# Patient Record
Sex: MALE | Race: WHITE | NOT HISPANIC OR LATINO | URBAN - METROPOLITAN AREA
[De-identification: names, ages, dates, MRNs, and addresses within clinical notes are randomized per-mention and may not be internally consistent; named-entity substitution may affect disease eponyms.]

---

## 2017-12-23 ENCOUNTER — EMERGENCY (EMERGENCY)
Facility: HOSPITAL | Age: 72
LOS: 1 days | Discharge: HOME | End: 2017-12-23
Attending: EMERGENCY MEDICINE | Admitting: EMERGENCY MEDICINE
Payer: MEDICARE

## 2017-12-23 VITALS
OXYGEN SATURATION: 98 % | HEART RATE: 109 BPM | RESPIRATION RATE: 20 BRPM | TEMPERATURE: 98 F | WEIGHT: 250 LBS | SYSTOLIC BLOOD PRESSURE: 166 MMHG | HEIGHT: 72 IN | DIASTOLIC BLOOD PRESSURE: 90 MMHG

## 2017-12-23 VITALS
TEMPERATURE: 98 F | RESPIRATION RATE: 16 BRPM | DIASTOLIC BLOOD PRESSURE: 74 MMHG | OXYGEN SATURATION: 98 % | SYSTOLIC BLOOD PRESSURE: 132 MMHG | HEART RATE: 88 BPM

## 2017-12-23 DIAGNOSIS — Z98.89 OTHER SPECIFIED POSTPROCEDURAL STATES: Chronic | ICD-10-CM

## 2017-12-23 LAB
ALBUMIN SERPL ELPH-MCNC: 4.2 G/DL — SIGNIFICANT CHANGE UP (ref 3.3–5.2)
ALP SERPL-CCNC: 60 U/L — SIGNIFICANT CHANGE UP (ref 40–120)
ALT FLD-CCNC: 23 U/L — SIGNIFICANT CHANGE UP
ANION GAP SERPL CALC-SCNC: 13 MMOL/L — SIGNIFICANT CHANGE UP (ref 5–17)
AST SERPL-CCNC: 31 U/L — SIGNIFICANT CHANGE UP
BASOPHILS # BLD AUTO: 0 K/UL — SIGNIFICANT CHANGE UP (ref 0–0.2)
BASOPHILS NFR BLD AUTO: 0.7 % — SIGNIFICANT CHANGE UP (ref 0–2)
BILIRUB SERPL-MCNC: 1 MG/DL — SIGNIFICANT CHANGE UP (ref 0.4–2)
BUN SERPL-MCNC: 16 MG/DL — SIGNIFICANT CHANGE UP (ref 8–20)
CALCIUM SERPL-MCNC: 9.7 MG/DL — SIGNIFICANT CHANGE UP (ref 8.6–10.2)
CHLORIDE SERPL-SCNC: 101 MMOL/L — SIGNIFICANT CHANGE UP (ref 98–107)
CO2 SERPL-SCNC: 24 MMOL/L — SIGNIFICANT CHANGE UP (ref 22–29)
CREAT SERPL-MCNC: 0.98 MG/DL — SIGNIFICANT CHANGE UP (ref 0.5–1.3)
EOSINOPHIL # BLD AUTO: 0.2 K/UL — SIGNIFICANT CHANGE UP (ref 0–0.5)
EOSINOPHIL NFR BLD AUTO: 3.6 % — SIGNIFICANT CHANGE UP (ref 0–5)
GLUCOSE SERPL-MCNC: 128 MG/DL — HIGH (ref 70–115)
HCT VFR BLD CALC: 38.8 % — LOW (ref 42–52)
HGB BLD-MCNC: 14 G/DL — SIGNIFICANT CHANGE UP (ref 14–18)
LYMPHOCYTES # BLD AUTO: 1.2 K/UL — SIGNIFICANT CHANGE UP (ref 1–4.8)
LYMPHOCYTES # BLD AUTO: 17.6 % — LOW (ref 20–55)
MCHC RBC-ENTMCNC: 32.3 PG — HIGH (ref 27–31)
MCHC RBC-ENTMCNC: 36.1 G/DL — HIGH (ref 32–36)
MCV RBC AUTO: 89.6 FL — SIGNIFICANT CHANGE UP (ref 80–94)
MONOCYTES # BLD AUTO: 0.5 K/UL — SIGNIFICANT CHANGE UP (ref 0–0.8)
MONOCYTES NFR BLD AUTO: 7.4 % — SIGNIFICANT CHANGE UP (ref 3–10)
NEUTROPHILS # BLD AUTO: 4.8 K/UL — SIGNIFICANT CHANGE UP (ref 1.8–8)
NEUTROPHILS NFR BLD AUTO: 70.4 % — SIGNIFICANT CHANGE UP (ref 37–73)
NT-PROBNP SERPL-SCNC: 39 PG/ML — SIGNIFICANT CHANGE UP (ref 0–300)
PLATELET # BLD AUTO: 149 K/UL — LOW (ref 150–400)
POTASSIUM SERPL-MCNC: 3.8 MMOL/L — SIGNIFICANT CHANGE UP (ref 3.5–5.3)
POTASSIUM SERPL-SCNC: 3.8 MMOL/L — SIGNIFICANT CHANGE UP (ref 3.5–5.3)
PROT SERPL-MCNC: 8.8 G/DL — HIGH (ref 6.6–8.7)
RBC # BLD: 4.33 M/UL — LOW (ref 4.6–6.2)
RBC # FLD: 13.6 % — SIGNIFICANT CHANGE UP (ref 11–15.6)
SODIUM SERPL-SCNC: 138 MMOL/L — SIGNIFICANT CHANGE UP (ref 135–145)
TROPONIN T SERPL-MCNC: <0.01 NG/ML — SIGNIFICANT CHANGE UP (ref 0–0.06)
WBC # BLD: 6.9 K/UL — SIGNIFICANT CHANGE UP (ref 4.8–10.8)
WBC # FLD AUTO: 6.9 K/UL — SIGNIFICANT CHANGE UP (ref 4.8–10.8)

## 2017-12-23 PROCEDURE — 93005 ELECTROCARDIOGRAM TRACING: CPT

## 2017-12-23 PROCEDURE — 71010: CPT | Mod: 26

## 2017-12-23 PROCEDURE — 83880 ASSAY OF NATRIURETIC PEPTIDE: CPT

## 2017-12-23 PROCEDURE — 80053 COMPREHEN METABOLIC PANEL: CPT

## 2017-12-23 PROCEDURE — 71045 X-RAY EXAM CHEST 1 VIEW: CPT

## 2017-12-23 PROCEDURE — 99284 EMERGENCY DEPT VISIT MOD MDM: CPT | Mod: 25

## 2017-12-23 PROCEDURE — 99285 EMERGENCY DEPT VISIT HI MDM: CPT

## 2017-12-23 PROCEDURE — 93010 ELECTROCARDIOGRAM REPORT: CPT

## 2017-12-23 PROCEDURE — 36415 COLL VENOUS BLD VENIPUNCTURE: CPT

## 2017-12-23 PROCEDURE — 85027 COMPLETE CBC AUTOMATED: CPT

## 2017-12-23 PROCEDURE — 84484 ASSAY OF TROPONIN QUANT: CPT

## 2017-12-23 RX ORDER — ALPRAZOLAM 0.25 MG
1 TABLET ORAL
Qty: 6 | Refills: 0 | OUTPATIENT
Start: 2017-12-23 | End: 2017-12-24

## 2017-12-23 RX ORDER — ALPRAZOLAM 0.25 MG
1 TABLET ORAL
Qty: 6 | Refills: 0
Start: 2017-12-23 | End: 2017-12-24

## 2017-12-23 RX ORDER — SODIUM CHLORIDE 9 MG/ML
3 INJECTION INTRAMUSCULAR; INTRAVENOUS; SUBCUTANEOUS ONCE
Qty: 0 | Refills: 0 | Status: COMPLETED | OUTPATIENT
Start: 2017-12-23 | End: 2017-12-23

## 2017-12-23 RX ORDER — METOPROLOL TARTRATE 50 MG
25 TABLET ORAL ONCE
Qty: 0 | Refills: 0 | Status: COMPLETED | OUTPATIENT
Start: 2017-12-23 | End: 2017-12-23

## 2017-12-23 RX ORDER — ALPRAZOLAM 0.25 MG
0.25 TABLET ORAL ONCE
Qty: 0 | Refills: 0 | Status: DISCONTINUED | OUTPATIENT
Start: 2017-12-23 | End: 2017-12-23

## 2017-12-23 RX ORDER — AZITHROMYCIN 500 MG/1
1 TABLET, FILM COATED ORAL
Qty: 5 | Refills: 0
Start: 2017-12-23 | End: 2017-12-27

## 2017-12-23 RX ADMIN — SODIUM CHLORIDE 3 MILLILITER(S): 9 INJECTION INTRAMUSCULAR; INTRAVENOUS; SUBCUTANEOUS at 11:25

## 2017-12-23 RX ADMIN — Medication 0.25 MILLIGRAM(S): at 11:25

## 2017-12-23 NOTE — ED PROVIDER NOTE - MEDICAL DECISION MAKING DETAILS
will obtain labs and xray ekg to assess for cardiac condition will given lopressor and xanax will reevaluate

## 2017-12-23 NOTE — ED ADULT NURSE NOTE - OBJECTIVE STATEMENT
Pt states he suffers from anxiety but does not get treated.  He has been feeling shortness of breath and taking his blood pressure "a lot"   pt states he is now feeling better and wants a prescription for xanax.    HR is Normal Sinus Rhythm on card monitor, lungs clear b/l abd soft with positive bowel sounds in all four quadrants

## 2017-12-23 NOTE — ED PROVIDER NOTE - OBJECTIVE STATEMENT
c/o sob hx of asthma hypertension and anxiety no chest pain no nausea no vomiiting no diaphoresis no smoking no drinking pmd treating patient for asthma

## 2018-01-14 ENCOUNTER — EMERGENCY (EMERGENCY)
Facility: HOSPITAL | Age: 73
LOS: 1 days | Discharge: HOME | End: 2018-01-14
Attending: EMERGENCY MEDICINE
Payer: MEDICARE

## 2018-01-14 VITALS
SYSTOLIC BLOOD PRESSURE: 147 MMHG | DIASTOLIC BLOOD PRESSURE: 97 MMHG | RESPIRATION RATE: 18 BRPM | OXYGEN SATURATION: 95 % | HEART RATE: 100 BPM

## 2018-01-14 VITALS
TEMPERATURE: 98 F | HEIGHT: 72 IN | DIASTOLIC BLOOD PRESSURE: 88 MMHG | HEART RATE: 110 BPM | RESPIRATION RATE: 20 BRPM | WEIGHT: 244.93 LBS | SYSTOLIC BLOOD PRESSURE: 146 MMHG | OXYGEN SATURATION: 95 %

## 2018-01-14 DIAGNOSIS — R07.9 CHEST PAIN, UNSPECIFIED: ICD-10-CM

## 2018-01-14 DIAGNOSIS — Z98.89 OTHER SPECIFIED POSTPROCEDURAL STATES: Chronic | ICD-10-CM

## 2018-01-14 LAB
ALBUMIN SERPL ELPH-MCNC: 4.2 G/DL — SIGNIFICANT CHANGE UP (ref 3.3–5.2)
ALP SERPL-CCNC: 61 U/L — SIGNIFICANT CHANGE UP (ref 40–120)
ALT FLD-CCNC: 24 U/L — SIGNIFICANT CHANGE UP
ANION GAP SERPL CALC-SCNC: 14 MMOL/L — SIGNIFICANT CHANGE UP (ref 5–17)
AST SERPL-CCNC: 34 U/L — SIGNIFICANT CHANGE UP
BILIRUB SERPL-MCNC: 0.7 MG/DL — SIGNIFICANT CHANGE UP (ref 0.4–2)
BUN SERPL-MCNC: 21 MG/DL — HIGH (ref 8–20)
CALCIUM SERPL-MCNC: 9.9 MG/DL — SIGNIFICANT CHANGE UP (ref 8.6–10.2)
CHLORIDE SERPL-SCNC: 99 MMOL/L — SIGNIFICANT CHANGE UP (ref 98–107)
CK SERPL-CCNC: 104 U/L — SIGNIFICANT CHANGE UP (ref 30–200)
CO2 SERPL-SCNC: 24 MMOL/L — SIGNIFICANT CHANGE UP (ref 22–29)
CREAT SERPL-MCNC: 1.12 MG/DL — SIGNIFICANT CHANGE UP (ref 0.5–1.3)
D DIMER BLD IA.RAPID-MCNC: 983 NG/ML DDU — HIGH
GLUCOSE SERPL-MCNC: 178 MG/DL — HIGH (ref 70–115)
HCT VFR BLD CALC: 40.2 % — LOW (ref 42–52)
HGB BLD-MCNC: 14.1 G/DL — SIGNIFICANT CHANGE UP (ref 14–18)
MCHC RBC-ENTMCNC: 32 PG — HIGH (ref 27–31)
MCHC RBC-ENTMCNC: 35.1 G/DL — SIGNIFICANT CHANGE UP (ref 32–36)
MCV RBC AUTO: 91.4 FL — SIGNIFICANT CHANGE UP (ref 80–94)
NT-PROBNP SERPL-SCNC: 23 PG/ML — SIGNIFICANT CHANGE UP (ref 0–300)
PLATELET # BLD AUTO: 156 K/UL — SIGNIFICANT CHANGE UP (ref 150–400)
POTASSIUM SERPL-MCNC: 4.6 MMOL/L — SIGNIFICANT CHANGE UP (ref 3.5–5.3)
POTASSIUM SERPL-SCNC: 4.6 MMOL/L — SIGNIFICANT CHANGE UP (ref 3.5–5.3)
PROT SERPL-MCNC: 8.6 G/DL — SIGNIFICANT CHANGE UP (ref 6.6–8.7)
RBC # BLD: 4.4 M/UL — LOW (ref 4.6–6.2)
RBC # FLD: 13.6 % — SIGNIFICANT CHANGE UP (ref 11–15.6)
SODIUM SERPL-SCNC: 137 MMOL/L — SIGNIFICANT CHANGE UP (ref 135–145)
TROPONIN T SERPL-MCNC: <0.01 NG/ML — SIGNIFICANT CHANGE UP (ref 0–0.06)
TROPONIN T SERPL-MCNC: <0.01 NG/ML — SIGNIFICANT CHANGE UP (ref 0–0.06)
WBC # BLD: 6 K/UL — SIGNIFICANT CHANGE UP (ref 4.8–10.8)
WBC # FLD AUTO: 6 K/UL — SIGNIFICANT CHANGE UP (ref 4.8–10.8)

## 2018-01-14 PROCEDURE — 85027 COMPLETE CBC AUTOMATED: CPT

## 2018-01-14 PROCEDURE — 36415 COLL VENOUS BLD VENIPUNCTURE: CPT

## 2018-01-14 PROCEDURE — 84484 ASSAY OF TROPONIN QUANT: CPT

## 2018-01-14 PROCEDURE — 93010 ELECTROCARDIOGRAM REPORT: CPT

## 2018-01-14 PROCEDURE — 93005 ELECTROCARDIOGRAM TRACING: CPT

## 2018-01-14 PROCEDURE — 82550 ASSAY OF CK (CPK): CPT

## 2018-01-14 PROCEDURE — 71045 X-RAY EXAM CHEST 1 VIEW: CPT | Mod: 26

## 2018-01-14 PROCEDURE — 80053 COMPREHEN METABOLIC PANEL: CPT

## 2018-01-14 PROCEDURE — 96374 THER/PROPH/DIAG INJ IV PUSH: CPT | Mod: XU

## 2018-01-14 PROCEDURE — 71275 CT ANGIOGRAPHY CHEST: CPT | Mod: 26

## 2018-01-14 PROCEDURE — 99284 EMERGENCY DEPT VISIT MOD MDM: CPT

## 2018-01-14 PROCEDURE — 85379 FIBRIN DEGRADATION QUANT: CPT

## 2018-01-14 PROCEDURE — 94640 AIRWAY INHALATION TREATMENT: CPT

## 2018-01-14 PROCEDURE — 99284 EMERGENCY DEPT VISIT MOD MDM: CPT | Mod: 25

## 2018-01-14 PROCEDURE — 71045 X-RAY EXAM CHEST 1 VIEW: CPT

## 2018-01-14 PROCEDURE — 83880 ASSAY OF NATRIURETIC PEPTIDE: CPT

## 2018-01-14 PROCEDURE — 71275 CT ANGIOGRAPHY CHEST: CPT

## 2018-01-14 RX ORDER — ALBUTEROL 90 UG/1
2 AEROSOL, METERED ORAL
Qty: 2 | Refills: 0 | OUTPATIENT
Start: 2018-01-14 | End: 2018-01-23

## 2018-01-14 RX ORDER — IPRATROPIUM/ALBUTEROL SULFATE 18-103MCG
3 AEROSOL WITH ADAPTER (GRAM) INHALATION ONCE
Qty: 0 | Refills: 0 | Status: COMPLETED | OUTPATIENT
Start: 2018-01-14 | End: 2018-01-14

## 2018-01-14 RX ADMIN — Medication 3 MILLILITER(S): at 15:19

## 2018-01-14 RX ADMIN — Medication 125 MILLIGRAM(S): at 15:19

## 2018-01-14 NOTE — ED ADULT TRIAGE NOTE - CHIEF COMPLAINT QUOTE
was here 2 weeks for bronchitis and dc home. has a cough with white productive and green. arrives today with dyspnea and some CP with inhalation.

## 2018-01-14 NOTE — CONSULT NOTE ADULT - PROBLEM SELECTOR RECOMMENDATION 9
CP and SOB.   SOB- most likely due to copd/asthma. (CT imaging)   No evidence of PE.   CP- normal EKG, troponins. No further inpt workup. DC home and follow up for further outpatient risk stratification.

## 2018-01-14 NOTE — CONSULT NOTE ADULT - SUBJECTIVE AND OBJECTIVE BOX
[facility]                                                        Office: [address] [address]                                                       Telephone: [phone]. Fax:[phone]      CC: Chest pain/shortness of breath    HPI: Patient is a  72y Male with history of asthma/copd, hypertension presenting with chest pain/shortness of breath. Patient's symptoms are mostly shortness of breath which have been present for a few days. History of asthma and second hand smoke.   Significant anxiety.   Chest pain, substernal, ? of GERD as per patient, no radiation, no n/v/d. Relieved spontaneously.     PAST MEDICAL & SURGICAL HISTORY:  Asthma  BPH (benign prostatic hyperplasia)  Anxiety  HTN (hypertension)  History of incisional hernia repair  S/P appendectomy    FAMILY HISTORY: none.     SOCIAL HISTORY: no EtOH, drugs or tobacco    MEDICATIONS  (STANDING):  ALBUTerol/ipratropium for Nebulization. 3 milliLiter(s) Nebulizer once  methylPREDNISolone sodium succinate Injectable 125 milliGRAM(s) IV Push Once    ROS: All others negative    PHYSICAL EXAM:  Vital Signs Last 24 Hrs  T(C): 36.5 (14 Jan 2018 11:14), Max: 36.6 (14 Jan 2018 10:11)  T(F): 97.7 (14 Jan 2018 11:14), Max: 97.9 (14 Jan 2018 10:11)  HR: 99 (14 Jan 2018 11:14) (99 - 110)  BP: 144/65 (14 Jan 2018 11:14) (144/65 - 146/88)  BP(mean): --  RR: 18 (14 Jan 2018 11:14) (18 - 20)  SpO2: 95% (14 Jan 2018 11:14) (95% - 95%)  I&O's Summary    Appearance: Normal	  HEENT:   Normal oral mucosa, PERRL, EOMI	  Lymphatic: No lymphadenopathy  Cardiovascular: Normal S1 S2, No JVD, No murmurs, No edema  Respiratory: Lungs clear to auscultation	  Psychiatry: A & O x 3, Mood & affect appropriate  Gastrointestinal:  Soft, Non-tender, + BS	  Skin: No rashes, No ecchymoses, No cyanosis  Neurologic: Non-focal  Extremities: Normal range of motion, No clubbing, cyanosis or edema  Vascular: Peripheral pulses palpable 2+ bilaterally    ECG: Sinus tach with no st-t wave abnormalities.   LABS:                        14.1   6.0   )-----------( 156      ( 14 Jan 2018 10:49 )             40.2     01-14    137  |  99  |  21.0<H>  ----------------------------<  178<H>  4.6   |  24.0  |  1.12    Ca    9.9      14 Jan 2018 10:49    TPro  8.6  /  Alb  4.2  /  TBili  0.7  /  DBili  x   /  AST  34  /  ALT  24  /  AlkPhos  61  01-14      CARDIAC MARKERS ( 14 Jan 2018 10:49 )  x     / <0.01 ng/mL / 104 U/L / x     / x          RADIOLOGY & ADDITIONAL STUDIES: Chest Ct - neg.

## 2018-01-14 NOTE — ED ADULT NURSE REASSESSMENT NOTE - NS ED NURSE REASSESS COMMENT FT1
dr king at bedside evaluating pt, pt awaiting dispo. dispo pending CT scan results. will continue to monitor and reassess

## 2018-01-14 NOTE — ED ADULT NURSE NOTE - OBJECTIVE STATEMENT
pt presents to ED with SOB on exertion x few weeks. pt states he was seen in ED a few weeks ago and dx with bronchitis. pt is very anxious. pt states "I think I have a blood clot, or maybe a blockage, it may be my gall bladder because I have a lump right here". pt states he has hx of anxiety, pt is taking xanax with minimal relief. pt states he feel mid-sternal chest pain upon deep inhalation. breathing si even and unlabored. no sxs resp distress noted. [pt placed on cardiac monitor NSR noted with rate of 77. will continue to monitor and reassess,

## 2018-01-17 ENCOUNTER — APPOINTMENT (OUTPATIENT)
Dept: PULMONOLOGY | Facility: CLINIC | Age: 73
End: 2018-01-17
Payer: MEDICARE

## 2018-01-17 VITALS
WEIGHT: 242 LBS | SYSTOLIC BLOOD PRESSURE: 130 MMHG | DIASTOLIC BLOOD PRESSURE: 60 MMHG | OXYGEN SATURATION: 92 % | BODY MASS INDEX: 34.65 KG/M2 | HEIGHT: 70 IN | HEART RATE: 108 BPM

## 2018-01-17 DIAGNOSIS — R06.2 WHEEZING: ICD-10-CM

## 2018-01-17 DIAGNOSIS — Z86.79 PERSONAL HISTORY OF OTHER DISEASES OF THE CIRCULATORY SYSTEM: ICD-10-CM

## 2018-01-17 DIAGNOSIS — Z87.438 PERSONAL HISTORY OF OTHER DISEASES OF MALE GENITAL ORGANS: ICD-10-CM

## 2018-01-17 DIAGNOSIS — F41.9 ANXIETY DISORDER, UNSPECIFIED: ICD-10-CM

## 2018-01-17 PROCEDURE — 94060 EVALUATION OF WHEEZING: CPT

## 2018-01-17 PROCEDURE — 94729 DIFFUSING CAPACITY: CPT

## 2018-01-17 PROCEDURE — 99205 OFFICE O/P NEW HI 60 MIN: CPT | Mod: 25

## 2018-01-17 PROCEDURE — 94727 GAS DIL/WSHOT DETER LNG VOL: CPT

## 2018-01-17 PROCEDURE — 85018 HEMOGLOBIN: CPT | Mod: QW

## 2018-01-17 PROCEDURE — 94664 DEMO&/EVAL PT USE INHALER: CPT | Mod: 59

## 2018-01-17 RX ORDER — ALPRAZOLAM 0.25 MG/1
0.25 TABLET ORAL
Qty: 6 | Refills: 0 | Status: DISCONTINUED | COMMUNITY
Start: 2017-12-23 | End: 2018-01-17

## 2018-01-17 RX ORDER — CEFUROXIME AXETIL 500 MG/1
500 TABLET ORAL
Qty: 20 | Refills: 0 | Status: DISCONTINUED | COMMUNITY
Start: 2018-01-08 | End: 2018-01-17

## 2018-01-17 RX ORDER — AZITHROMYCIN 500 MG/1
500 TABLET, FILM COATED ORAL
Qty: 5 | Refills: 0 | Status: DISCONTINUED | COMMUNITY
Start: 2017-12-23 | End: 2018-01-17

## 2018-01-17 RX ORDER — CIPROFLOXACIN HYDROCHLORIDE 500 MG/1
500 TABLET, FILM COATED ORAL
Qty: 20 | Refills: 0 | Status: DISCONTINUED | COMMUNITY
Start: 2017-09-10 | End: 2018-01-17

## 2018-01-17 RX ORDER — PREDNISONE 20 MG/1
20 TABLET ORAL
Qty: 6 | Refills: 0 | Status: DISCONTINUED | COMMUNITY
Start: 2017-09-30 | End: 2018-01-17

## 2018-01-17 NOTE — ED PROVIDER NOTE - OBJECTIVE STATEMENT
71 yo male pmh asthma/copd comes to ed with increasing shortness of breath on exertion; pt noted exposed to second hand smoke from spouse; pt denies fever, chills, and cough; pt with recent visit in 12/2017 for similar symptoms

## 2018-01-22 ENCOUNTER — APPOINTMENT (OUTPATIENT)
Dept: THORACIC SURGERY | Facility: CLINIC | Age: 73
End: 2018-01-22
Payer: MEDICARE

## 2018-01-22 VITALS
HEART RATE: 105 BPM | BODY MASS INDEX: 34.65 KG/M2 | OXYGEN SATURATION: 93 % | WEIGHT: 242 LBS | DIASTOLIC BLOOD PRESSURE: 91 MMHG | RESPIRATION RATE: 16 BRPM | HEIGHT: 70 IN | SYSTOLIC BLOOD PRESSURE: 153 MMHG

## 2018-01-22 PROCEDURE — 99205 OFFICE O/P NEW HI 60 MIN: CPT

## 2018-01-25 ENCOUNTER — APPOINTMENT (OUTPATIENT)
Dept: CARDIOLOGY | Facility: CLINIC | Age: 73
End: 2018-01-25
Payer: MEDICARE

## 2018-01-25 ENCOUNTER — NON-APPOINTMENT (OUTPATIENT)
Age: 73
End: 2018-01-25

## 2018-01-25 VITALS
SYSTOLIC BLOOD PRESSURE: 124 MMHG | WEIGHT: 238 LBS | HEART RATE: 106 BPM | DIASTOLIC BLOOD PRESSURE: 78 MMHG | OXYGEN SATURATION: 96 % | BODY MASS INDEX: 34.15 KG/M2

## 2018-01-25 DIAGNOSIS — Z09 ENCOUNTER FOR FOLLOW-UP EXAMINATION AFTER COMPLETED TREATMENT FOR CONDITIONS OTHER THAN MALIGNANT NEOPLASM: ICD-10-CM

## 2018-01-25 DIAGNOSIS — Z01.810 ENCOUNTER FOR PREPROCEDURAL CARDIOVASCULAR EXAMINATION: ICD-10-CM

## 2018-01-25 PROCEDURE — 93000 ELECTROCARDIOGRAM COMPLETE: CPT

## 2018-01-25 PROCEDURE — 99214 OFFICE O/P EST MOD 30 MIN: CPT

## 2018-01-25 RX ORDER — CETIRIZINE HYDROCHLORIDE 10 MG/1
10 TABLET, COATED ORAL
Qty: 10 | Refills: 0 | Status: DISCONTINUED | COMMUNITY
Start: 2017-09-30 | End: 2018-01-25

## 2018-01-25 RX ORDER — PNEUMOCOCCAL 23-VAL P-SAC VAC 25MCG/0.5
25 VIAL (ML) INJECTION
Qty: 1 | Refills: 0 | Status: DISCONTINUED | COMMUNITY
Start: 2017-12-18 | End: 2018-01-25

## 2018-01-26 ENCOUNTER — OUTPATIENT (OUTPATIENT)
Dept: OUTPATIENT SERVICES | Facility: HOSPITAL | Age: 73
LOS: 1 days | End: 2018-01-26
Payer: MEDICARE

## 2018-01-26 VITALS
SYSTOLIC BLOOD PRESSURE: 150 MMHG | TEMPERATURE: 98 F | RESPIRATION RATE: 16 BRPM | HEIGHT: 71 IN | WEIGHT: 240.74 LBS | DIASTOLIC BLOOD PRESSURE: 80 MMHG | HEART RATE: 112 BPM

## 2018-01-26 DIAGNOSIS — Z98.89 OTHER SPECIFIED POSTPROCEDURAL STATES: Chronic | ICD-10-CM

## 2018-01-26 DIAGNOSIS — Z01.810 ENCOUNTER FOR PREPROCEDURAL CARDIOVASCULAR EXAMINATION: ICD-10-CM

## 2018-01-26 DIAGNOSIS — J47.9 BRONCHIECTASIS, UNCOMPLICATED: ICD-10-CM

## 2018-01-26 DIAGNOSIS — Z01.811 ENCOUNTER FOR PREPROCEDURAL RESPIRATORY EXAMINATION: ICD-10-CM

## 2018-01-26 DIAGNOSIS — I10 ESSENTIAL (PRIMARY) HYPERTENSION: ICD-10-CM

## 2018-01-26 DIAGNOSIS — R06.00 DYSPNEA, UNSPECIFIED: ICD-10-CM

## 2018-01-26 DIAGNOSIS — Z87.81 PERSONAL HISTORY OF (HEALED) TRAUMATIC FRACTURE: Chronic | ICD-10-CM

## 2018-01-26 LAB
ALBUMIN SERPL ELPH-MCNC: 4.3 G/DL — SIGNIFICANT CHANGE UP (ref 3.3–5.2)
ALP SERPL-CCNC: 64 U/L — SIGNIFICANT CHANGE UP (ref 40–120)
ALT FLD-CCNC: 33 U/L — SIGNIFICANT CHANGE UP
ANION GAP SERPL CALC-SCNC: 18 MMOL/L — HIGH (ref 5–17)
APTT BLD: 32.2 SEC — SIGNIFICANT CHANGE UP (ref 27.5–37.4)
AST SERPL-CCNC: 34 U/L — SIGNIFICANT CHANGE UP
BASOPHILS # BLD AUTO: 0 K/UL — SIGNIFICANT CHANGE UP (ref 0–0.2)
BASOPHILS NFR BLD AUTO: 0.4 % — SIGNIFICANT CHANGE UP (ref 0–2)
BILIRUB SERPL-MCNC: 1 MG/DL — SIGNIFICANT CHANGE UP (ref 0.4–2)
BLD GP AB SCN SERPL QL: SIGNIFICANT CHANGE UP
BUN SERPL-MCNC: 22 MG/DL — HIGH (ref 8–20)
CALCIUM SERPL-MCNC: 10 MG/DL — SIGNIFICANT CHANGE UP (ref 8.6–10.2)
CHLORIDE SERPL-SCNC: 97 MMOL/L — LOW (ref 98–107)
CO2 SERPL-SCNC: 24 MMOL/L — SIGNIFICANT CHANGE UP (ref 22–29)
CREAT SERPL-MCNC: 1.24 MG/DL — SIGNIFICANT CHANGE UP (ref 0.5–1.3)
EOSINOPHIL # BLD AUTO: 0.2 K/UL — SIGNIFICANT CHANGE UP (ref 0–0.5)
EOSINOPHIL NFR BLD AUTO: 2.7 % — SIGNIFICANT CHANGE UP (ref 0–5)
GLUCOSE SERPL-MCNC: 112 MG/DL — SIGNIFICANT CHANGE UP (ref 70–115)
HCT VFR BLD CALC: 42.4 % — SIGNIFICANT CHANGE UP (ref 42–52)
HGB BLD-MCNC: 14.4 G/DL — SIGNIFICANT CHANGE UP (ref 14–18)
INR BLD: 1.1 RATIO — SIGNIFICANT CHANGE UP (ref 0.88–1.16)
LYMPHOCYTES # BLD AUTO: 1.1 K/UL — SIGNIFICANT CHANGE UP (ref 1–4.8)
LYMPHOCYTES # BLD AUTO: 14.5 % — LOW (ref 20–55)
MCHC RBC-ENTMCNC: 31.2 PG — HIGH (ref 27–31)
MCHC RBC-ENTMCNC: 34 G/DL — SIGNIFICANT CHANGE UP (ref 32–36)
MCV RBC AUTO: 92 FL — SIGNIFICANT CHANGE UP (ref 80–94)
MONOCYTES # BLD AUTO: 0.9 K/UL — HIGH (ref 0–0.8)
MONOCYTES NFR BLD AUTO: 11.9 % — HIGH (ref 3–10)
NEUTROPHILS # BLD AUTO: 5.4 K/UL — SIGNIFICANT CHANGE UP (ref 1.8–8)
NEUTROPHILS NFR BLD AUTO: 70.4 % — SIGNIFICANT CHANGE UP (ref 37–73)
PLATELET # BLD AUTO: 158 K/UL — SIGNIFICANT CHANGE UP (ref 150–400)
POTASSIUM SERPL-MCNC: 4.5 MMOL/L — SIGNIFICANT CHANGE UP (ref 3.5–5.3)
POTASSIUM SERPL-SCNC: 4.5 MMOL/L — SIGNIFICANT CHANGE UP (ref 3.5–5.3)
PROT SERPL-MCNC: 8.6 G/DL — SIGNIFICANT CHANGE UP (ref 6.6–8.7)
PROTHROM AB SERPL-ACNC: 12.1 SEC — SIGNIFICANT CHANGE UP (ref 9.8–12.7)
RBC # BLD: 4.61 M/UL — SIGNIFICANT CHANGE UP (ref 4.6–6.2)
RBC # FLD: 13.6 % — SIGNIFICANT CHANGE UP (ref 11–15.6)
SODIUM SERPL-SCNC: 139 MMOL/L — SIGNIFICANT CHANGE UP (ref 135–145)
TYPE + AB SCN PNL BLD: SIGNIFICANT CHANGE UP
WBC # BLD: 7.7 K/UL — SIGNIFICANT CHANGE UP (ref 4.8–10.8)
WBC # FLD AUTO: 7.7 K/UL — SIGNIFICANT CHANGE UP (ref 4.8–10.8)

## 2018-01-26 PROCEDURE — G0463: CPT

## 2018-01-26 PROCEDURE — 86901 BLOOD TYPING SEROLOGIC RH(D): CPT

## 2018-01-26 PROCEDURE — 85730 THROMBOPLASTIN TIME PARTIAL: CPT

## 2018-01-26 PROCEDURE — 85610 PROTHROMBIN TIME: CPT

## 2018-01-26 PROCEDURE — 86850 RBC ANTIBODY SCREEN: CPT

## 2018-01-26 PROCEDURE — 36415 COLL VENOUS BLD VENIPUNCTURE: CPT

## 2018-01-26 PROCEDURE — 85027 COMPLETE CBC AUTOMATED: CPT

## 2018-01-26 PROCEDURE — 80053 COMPREHEN METABOLIC PANEL: CPT

## 2018-01-26 PROCEDURE — 86900 BLOOD TYPING SEROLOGIC ABO: CPT

## 2018-01-26 RX ORDER — SODIUM CHLORIDE 9 MG/ML
3 INJECTION INTRAMUSCULAR; INTRAVENOUS; SUBCUTANEOUS EVERY 8 HOURS
Qty: 0 | Refills: 0 | Status: DISCONTINUED | OUTPATIENT
Start: 2018-02-02 | End: 2018-02-04

## 2018-01-26 NOTE — H&P PST ADULT - NSANTHOSAYNRD_GEN_A_CORE
No. GABE screening performed.  STOP BANG Legend: 0-2 = LOW Risk; 3-4 = INTERMEDIATE Risk; 5-8 = HIGH Risk

## 2018-01-26 NOTE — H&P PST ADULT - PMH
Anxiety    Asthma    BPH (benign prostatic hyperplasia)    Bronchiectasis    Dyspnea    HTN (hypertension)

## 2018-01-26 NOTE — H&P PST ADULT - ASSESSMENT
Pleasant 72 year old male with history of HTN , cough and shortness of breath , presents for VATS procedure with DR. Chacon.

## 2018-01-26 NOTE — H&P PST ADULT - HISTORY OF PRESENT ILLNESS
72 year old male presents with c/o difficulty breathing at times over past year, worse over past 6 months. Pt went to ER on 1/14/18 with difficulty breathing , ct scan done and emphysematous changes noted. Pt uses nebulizer and inhaler at home as needed. Denies any difficulty breathing at present , dry cough usually in AM > Scheduled for right VAts lung resection with DR. Martinez .Pt states he needs biopsy for diagnosis.

## 2018-01-26 NOTE — H&P PST ADULT - PSH
H/O reduction of nasal fracture  deviated septum  History of incisional hernia repair    S/P appendectomy

## 2018-02-02 ENCOUNTER — INPATIENT (INPATIENT)
Facility: HOSPITAL | Age: 73
LOS: 1 days | Discharge: HOME HEALTH | DRG: 167 | End: 2018-02-04
Attending: THORACIC SURGERY (CARDIOTHORACIC VASCULAR SURGERY) | Admitting: THORACIC SURGERY (CARDIOTHORACIC VASCULAR SURGERY)
Payer: MEDICARE

## 2018-02-02 ENCOUNTER — RESULT REVIEW (OUTPATIENT)
Age: 73
End: 2018-02-02

## 2018-02-02 ENCOUNTER — APPOINTMENT (OUTPATIENT)
Dept: THORACIC SURGERY | Facility: HOSPITAL | Age: 73
End: 2018-02-02
Payer: MEDICARE

## 2018-02-02 ENCOUNTER — TRANSCRIPTION ENCOUNTER (OUTPATIENT)
Age: 73
End: 2018-02-02

## 2018-02-02 VITALS
SYSTOLIC BLOOD PRESSURE: 127 MMHG | WEIGHT: 240.74 LBS | HEIGHT: 71 IN | OXYGEN SATURATION: 96 % | DIASTOLIC BLOOD PRESSURE: 70 MMHG | RESPIRATION RATE: 18 BRPM | TEMPERATURE: 98 F | HEART RATE: 99 BPM

## 2018-02-02 DIAGNOSIS — Z87.81 PERSONAL HISTORY OF (HEALED) TRAUMATIC FRACTURE: Chronic | ICD-10-CM

## 2018-02-02 DIAGNOSIS — R59.0 LOCALIZED ENLARGED LYMPH NODES: ICD-10-CM

## 2018-02-02 DIAGNOSIS — Z98.89 OTHER SPECIFIED POSTPROCEDURAL STATES: Chronic | ICD-10-CM

## 2018-02-02 LAB
BLD GP AB SCN SERPL QL: SIGNIFICANT CHANGE UP
TYPE + AB SCN PNL BLD: SIGNIFICANT CHANGE UP

## 2018-02-02 PROCEDURE — 32666 THORACOSCOPY W/WEDGE RESECT: CPT | Mod: AS

## 2018-02-02 PROCEDURE — 88307 TISSUE EXAM BY PATHOLOGIST: CPT | Mod: 26

## 2018-02-02 PROCEDURE — 99233 SBSQ HOSP IP/OBS HIGH 50: CPT

## 2018-02-02 PROCEDURE — 71045 X-RAY EXAM CHEST 1 VIEW: CPT | Mod: 26

## 2018-02-02 PROCEDURE — 32666 THORACOSCOPY W/WEDGE RESECT: CPT

## 2018-02-02 RX ORDER — FENTANYL CITRATE 50 UG/ML
50 INJECTION INTRAVENOUS
Qty: 0 | Refills: 0 | Status: DISCONTINUED | OUTPATIENT
Start: 2018-02-02 | End: 2018-02-02

## 2018-02-02 RX ORDER — ACETAMINOPHEN 500 MG
1000 TABLET ORAL ONCE
Qty: 0 | Refills: 0 | Status: DISCONTINUED | OUTPATIENT
Start: 2018-02-02 | End: 2018-02-03

## 2018-02-02 RX ORDER — NALOXONE HYDROCHLORIDE 4 MG/.1ML
0.1 SPRAY NASAL
Qty: 0 | Refills: 0 | Status: DISCONTINUED | OUTPATIENT
Start: 2018-02-02 | End: 2018-02-03

## 2018-02-02 RX ORDER — CEFAZOLIN SODIUM 1 G
2000 VIAL (EA) INJECTION ONCE
Qty: 0 | Refills: 0 | Status: COMPLETED | OUTPATIENT
Start: 2018-02-02 | End: 2018-02-02

## 2018-02-02 RX ORDER — ONDANSETRON 8 MG/1
4 TABLET, FILM COATED ORAL EVERY 6 HOURS
Qty: 0 | Refills: 0 | Status: DISCONTINUED | OUTPATIENT
Start: 2018-02-02 | End: 2018-02-03

## 2018-02-02 RX ORDER — FENTANYL CITRATE 50 UG/ML
30 INJECTION INTRAVENOUS
Qty: 0 | Refills: 0 | Status: DISCONTINUED | OUTPATIENT
Start: 2018-02-02 | End: 2018-02-03

## 2018-02-02 RX ORDER — ENOXAPARIN SODIUM 100 MG/ML
40 INJECTION SUBCUTANEOUS EVERY 24 HOURS
Qty: 0 | Refills: 0 | Status: DISCONTINUED | OUTPATIENT
Start: 2018-02-03 | End: 2018-02-04

## 2018-02-02 RX ORDER — SODIUM CHLORIDE 9 MG/ML
1000 INJECTION INTRAMUSCULAR; INTRAVENOUS; SUBCUTANEOUS
Qty: 0 | Refills: 0 | Status: DISCONTINUED | OUTPATIENT
Start: 2018-02-02 | End: 2018-02-02

## 2018-02-02 RX ORDER — TAMSULOSIN HYDROCHLORIDE 0.4 MG/1
0.4 CAPSULE ORAL AT BEDTIME
Qty: 0 | Refills: 0 | Status: DISCONTINUED | OUTPATIENT
Start: 2018-02-02 | End: 2018-02-04

## 2018-02-02 RX ORDER — ONDANSETRON 8 MG/1
4 TABLET, FILM COATED ORAL ONCE
Qty: 0 | Refills: 0 | Status: DISCONTINUED | OUTPATIENT
Start: 2018-02-02 | End: 2018-02-02

## 2018-02-02 RX ORDER — LISINOPRIL 2.5 MG/1
20 TABLET ORAL DAILY
Qty: 0 | Refills: 0 | Status: DISCONTINUED | OUTPATIENT
Start: 2018-02-03 | End: 2018-02-04

## 2018-02-02 RX ORDER — ASPIRIN/CALCIUM CARB/MAGNESIUM 324 MG
81 TABLET ORAL DAILY
Qty: 0 | Refills: 0 | Status: DISCONTINUED | OUTPATIENT
Start: 2018-02-03 | End: 2018-02-04

## 2018-02-02 RX ORDER — KETOROLAC TROMETHAMINE 30 MG/ML
30 SYRINGE (ML) INJECTION ONCE
Qty: 0 | Refills: 0 | Status: DISCONTINUED | OUTPATIENT
Start: 2018-02-02 | End: 2018-02-04

## 2018-02-02 RX ORDER — SODIUM CHLORIDE 9 MG/ML
1000 INJECTION, SOLUTION INTRAVENOUS
Qty: 0 | Refills: 0 | Status: DISCONTINUED | OUTPATIENT
Start: 2018-02-02 | End: 2018-02-04

## 2018-02-02 RX ORDER — DOCUSATE SODIUM 100 MG
100 CAPSULE ORAL THREE TIMES A DAY
Qty: 0 | Refills: 0 | Status: DISCONTINUED | OUTPATIENT
Start: 2018-02-02 | End: 2018-02-04

## 2018-02-02 RX ORDER — AMLODIPINE BESYLATE 2.5 MG/1
5 TABLET ORAL DAILY
Qty: 0 | Refills: 0 | Status: DISCONTINUED | OUTPATIENT
Start: 2018-02-03 | End: 2018-02-04

## 2018-02-02 RX ORDER — IPRATROPIUM/ALBUTEROL SULFATE 18-103MCG
3 AEROSOL WITH ADAPTER (GRAM) INHALATION EVERY 6 HOURS
Qty: 0 | Refills: 0 | Status: DISCONTINUED | OUTPATIENT
Start: 2018-02-02 | End: 2018-02-03

## 2018-02-02 RX ADMIN — SODIUM CHLORIDE 3 MILLILITER(S): 9 INJECTION INTRAMUSCULAR; INTRAVENOUS; SUBCUTANEOUS at 15:43

## 2018-02-02 RX ADMIN — TAMSULOSIN HYDROCHLORIDE 0.4 MILLIGRAM(S): 0.4 CAPSULE ORAL at 21:35

## 2018-02-02 RX ADMIN — SODIUM CHLORIDE 3 MILLILITER(S): 9 INJECTION INTRAMUSCULAR; INTRAVENOUS; SUBCUTANEOUS at 21:35

## 2018-02-02 RX ADMIN — Medication 40 MILLIGRAM(S): at 17:38

## 2018-02-02 RX ADMIN — FENTANYL CITRATE 30 MILLILITER(S): 50 INJECTION INTRAVENOUS at 13:01

## 2018-02-02 RX ADMIN — Medication 100 MILLIGRAM(S): at 11:35

## 2018-02-02 RX ADMIN — Medication 0.5 MILLIGRAM(S): at 20:04

## 2018-02-02 RX ADMIN — Medication 100 MILLIGRAM(S): at 21:35

## 2018-02-02 RX ADMIN — Medication 3 MILLILITER(S): at 20:28

## 2018-02-02 NOTE — PROGRESS NOTE ADULT - ASSESSMENT
-Pulmonary fibrosis; per discussion with radiologist as outpt, not classic appearance for UIP. Now s/p bx  -Restrictive lung dz  -MCKEE  -Cough  -Small apical PTX on R  -Anxiety    RECC:  IV steroids. O2. Pain control. Chest tube per Dr Chacon. F/U CXR. F/U path.

## 2018-02-02 NOTE — BRIEF OPERATIVE NOTE - PROCEDURE
<<-----Click on this checkbox to enter Procedure Biopsy, lung, using VATS  02/02/2018  Right.  Active  APANETTA1  Flexible bronchoscopy in adult  02/02/2018    Active  APANETTA1

## 2018-02-02 NOTE — BRIEF OPERATIVE NOTE - OPERATION/FINDINGS
Interstitial lung disease with cobble stone appearance suspicious for intersitial pulmonary fibrosis.

## 2018-02-03 ENCOUNTER — TRANSCRIPTION ENCOUNTER (OUTPATIENT)
Age: 73
End: 2018-02-03

## 2018-02-03 DIAGNOSIS — J95.89 OTHER POSTPROCEDURAL COMPLICATIONS AND DISORDERS OF RESPIRATORY SYSTEM, NOT ELSEWHERE CLASSIFIED: ICD-10-CM

## 2018-02-03 LAB
ANION GAP SERPL CALC-SCNC: 15 MMOL/L — SIGNIFICANT CHANGE UP (ref 5–17)
ANISOCYTOSIS BLD QL: SLIGHT — SIGNIFICANT CHANGE UP
BUN SERPL-MCNC: 21 MG/DL — HIGH (ref 8–20)
CALCIUM SERPL-MCNC: 8.9 MG/DL — SIGNIFICANT CHANGE UP (ref 8.6–10.2)
CHLORIDE SERPL-SCNC: 98 MMOL/L — SIGNIFICANT CHANGE UP (ref 98–107)
CO2 SERPL-SCNC: 24 MMOL/L — SIGNIFICANT CHANGE UP (ref 22–29)
CREAT SERPL-MCNC: 1.17 MG/DL — SIGNIFICANT CHANGE UP (ref 0.5–1.3)
GLUCOSE SERPL-MCNC: 174 MG/DL — HIGH (ref 70–115)
GRAM STN FLD: SIGNIFICANT CHANGE UP
GRAM STN FLD: SIGNIFICANT CHANGE UP
HCT VFR BLD CALC: 38.8 % — LOW (ref 42–52)
HGB BLD-MCNC: 13.3 G/DL — LOW (ref 14–18)
LYMPHOCYTES # BLD AUTO: 2 % — LOW (ref 20–55)
MACROCYTES BLD QL: SLIGHT — SIGNIFICANT CHANGE UP
MCHC RBC-ENTMCNC: 31.4 PG — HIGH (ref 27–31)
MCHC RBC-ENTMCNC: 34.3 G/DL — SIGNIFICANT CHANGE UP (ref 32–36)
MCV RBC AUTO: 91.5 FL — SIGNIFICANT CHANGE UP (ref 80–94)
MICROCYTES BLD QL: SLIGHT — SIGNIFICANT CHANGE UP
MONOCYTES NFR BLD AUTO: 2 % — LOW (ref 3–10)
MYELOCYTES NFR BLD: 1 % — HIGH (ref 0–0)
NEUTROPHILS NFR BLD AUTO: 95 % — HIGH (ref 37–73)
OVALOCYTES BLD QL SMEAR: SLIGHT — SIGNIFICANT CHANGE UP
PLAT MORPH BLD: NORMAL — SIGNIFICANT CHANGE UP
PLATELET # BLD AUTO: 146 K/UL — LOW (ref 150–400)
POIKILOCYTOSIS BLD QL AUTO: SLIGHT — SIGNIFICANT CHANGE UP
POTASSIUM SERPL-MCNC: 4.8 MMOL/L — SIGNIFICANT CHANGE UP (ref 3.5–5.3)
POTASSIUM SERPL-SCNC: 4.8 MMOL/L — SIGNIFICANT CHANGE UP (ref 3.5–5.3)
RBC # BLD: 4.24 M/UL — LOW (ref 4.6–6.2)
RBC # FLD: 13.7 % — SIGNIFICANT CHANGE UP (ref 11–15.6)
RBC BLD AUTO: ABNORMAL
SODIUM SERPL-SCNC: 137 MMOL/L — SIGNIFICANT CHANGE UP (ref 135–145)
SPECIMEN SOURCE: SIGNIFICANT CHANGE UP
SPECIMEN SOURCE: SIGNIFICANT CHANGE UP
WBC # BLD: 8.4 K/UL — SIGNIFICANT CHANGE UP (ref 4.8–10.8)
WBC # FLD AUTO: 8.4 K/UL — SIGNIFICANT CHANGE UP (ref 4.8–10.8)

## 2018-02-03 PROCEDURE — 99232 SBSQ HOSP IP/OBS MODERATE 35: CPT

## 2018-02-03 PROCEDURE — 71045 X-RAY EXAM CHEST 1 VIEW: CPT | Mod: 26

## 2018-02-03 PROCEDURE — 71045 X-RAY EXAM CHEST 1 VIEW: CPT | Mod: 26,77

## 2018-02-03 RX ORDER — OXYCODONE AND ACETAMINOPHEN 5; 325 MG/1; MG/1
2 TABLET ORAL EVERY 6 HOURS
Qty: 0 | Refills: 0 | Status: DISCONTINUED | OUTPATIENT
Start: 2018-02-03 | End: 2018-02-04

## 2018-02-03 RX ORDER — DOCUSATE SODIUM 100 MG
1 CAPSULE ORAL
Qty: 0 | Refills: 0 | DISCHARGE
Start: 2018-02-03

## 2018-02-03 RX ORDER — ALBUTEROL 90 UG/1
2 AEROSOL, METERED ORAL EVERY 6 HOURS
Qty: 0 | Refills: 0 | Status: DISCONTINUED | OUTPATIENT
Start: 2018-02-03 | End: 2018-02-04

## 2018-02-03 RX ORDER — TAMSULOSIN HYDROCHLORIDE 0.4 MG/1
1 CAPSULE ORAL
Qty: 0 | Refills: 0 | DISCHARGE
Start: 2018-02-03

## 2018-02-03 RX ORDER — AMLODIPINE BESYLATE 2.5 MG/1
1 TABLET ORAL
Qty: 0 | Refills: 0 | COMMUNITY

## 2018-02-03 RX ORDER — ASPIRIN/CALCIUM CARB/MAGNESIUM 324 MG
1 TABLET ORAL
Qty: 0 | Refills: 0 | DISCHARGE
Start: 2018-02-03

## 2018-02-03 RX ORDER — TAMSULOSIN HYDROCHLORIDE 0.4 MG/1
2 CAPSULE ORAL
Qty: 0 | Refills: 0 | DISCHARGE
Start: 2018-02-03

## 2018-02-03 RX ORDER — AMLODIPINE BESYLATE 2.5 MG/1
1 TABLET ORAL
Qty: 0 | Refills: 0 | DISCHARGE
Start: 2018-02-03

## 2018-02-03 RX ORDER — OXYCODONE AND ACETAMINOPHEN 5; 325 MG/1; MG/1
1 TABLET ORAL EVERY 4 HOURS
Qty: 0 | Refills: 0 | Status: DISCONTINUED | OUTPATIENT
Start: 2018-02-03 | End: 2018-02-04

## 2018-02-03 RX ADMIN — LISINOPRIL 20 MILLIGRAM(S): 2.5 TABLET ORAL at 05:29

## 2018-02-03 RX ADMIN — OXYCODONE AND ACETAMINOPHEN 1 TABLET(S): 5; 325 TABLET ORAL at 15:40

## 2018-02-03 RX ADMIN — Medication 100 MILLIGRAM(S): at 11:11

## 2018-02-03 RX ADMIN — OXYCODONE AND ACETAMINOPHEN 2 TABLET(S): 5; 325 TABLET ORAL at 22:30

## 2018-02-03 RX ADMIN — Medication 40 MILLIGRAM(S): at 00:56

## 2018-02-03 RX ADMIN — SODIUM CHLORIDE 3 MILLILITER(S): 9 INJECTION INTRAMUSCULAR; INTRAVENOUS; SUBCUTANEOUS at 21:28

## 2018-02-03 RX ADMIN — Medication 81 MILLIGRAM(S): at 11:11

## 2018-02-03 RX ADMIN — ENOXAPARIN SODIUM 40 MILLIGRAM(S): 100 INJECTION SUBCUTANEOUS at 21:29

## 2018-02-03 RX ADMIN — Medication 0.5 MILLIGRAM(S): at 06:13

## 2018-02-03 RX ADMIN — Medication 0.5 MILLIGRAM(S): at 21:29

## 2018-02-03 RX ADMIN — Medication 40 MILLIGRAM(S): at 05:29

## 2018-02-03 RX ADMIN — SODIUM CHLORIDE 3 MILLILITER(S): 9 INJECTION INTRAMUSCULAR; INTRAVENOUS; SUBCUTANEOUS at 07:37

## 2018-02-03 RX ADMIN — AMLODIPINE BESYLATE 5 MILLIGRAM(S): 2.5 TABLET ORAL at 05:29

## 2018-02-03 RX ADMIN — OXYCODONE AND ACETAMINOPHEN 1 TABLET(S): 5; 325 TABLET ORAL at 14:40

## 2018-02-03 RX ADMIN — SODIUM CHLORIDE 3 MILLILITER(S): 9 INJECTION INTRAMUSCULAR; INTRAVENOUS; SUBCUTANEOUS at 05:17

## 2018-02-03 RX ADMIN — Medication 100 MILLIGRAM(S): at 21:29

## 2018-02-03 RX ADMIN — Medication 3 MILLILITER(S): at 02:59

## 2018-02-03 RX ADMIN — Medication 40 MILLIGRAM(S): at 11:11

## 2018-02-03 RX ADMIN — OXYCODONE AND ACETAMINOPHEN 2 TABLET(S): 5; 325 TABLET ORAL at 21:29

## 2018-02-03 RX ADMIN — Medication 100 MILLIGRAM(S): at 05:29

## 2018-02-03 RX ADMIN — FENTANYL CITRATE 30 MILLILITER(S): 50 INJECTION INTRAVENOUS at 07:32

## 2018-02-03 NOTE — DISCHARGE NOTE ADULT - ADDITIONAL INSTRUCTIONS
Call Dr. Chacon's office to make surgical follow up appointment.    Call Dr. Mg's office (Pulmonologist) and make appointment for next week to manage oxygen therapy and lung [phone]

## 2018-02-03 NOTE — DISCHARGE NOTE ADULT - PHYSICIAN SECTION COMPLETE
[facility]    Date: 10/16/2017  Patient Name: Esther Dobbins        MRN: [id number]    Account #: [de-identified]  : 2013  (2 y.o.)  Gender: female             REASON FOR MISSED TREATMENT:    [x]Cancelled due to illness. [] Therapist Canceled Appointment  []Cancelled due to other appointment   []No Show / No call. Pt's guardian called with next scheduled appointment. [] Cancelled due to transportation conflict  []Cancelled due to weather  []Frequency of order changed  []Patient on hold due to:     [] Excused absence d/t at least 48 hour notice of cancellation      []Cancel /less than 48 hour notice.         []OTHER:        Electronically signed by:    Monik Ramirez PT, DPT            Date:10/16/2017
Yes

## 2018-02-03 NOTE — DISCHARGE NOTE ADULT - CARE PROVIDER_API CALL
Dung Chacon), Surgery; Thoracic Surgery  [address] [place] [address]  Phone: [phone]  Fax: [phone]    Rasta Mg), Critical Care Medicine; Pulmonary Disease; Sleep Medicine  [address] [id number]  Phone: [phone]  Fax: [phone]

## 2018-02-03 NOTE — PROGRESS NOTE ADULT - ASSESSMENT
-Pulmonary fibrosis; per discussion with radiologist as outpt, not classic appearance for UIP. Now s/p bx  -Restrictive lung dz  -MCKEE  -Cough  -Small apical PTX on R resolved per CXR  -Hypoxia  -Anxiety    RECC:  From pulmonary standpoint can d/c home on:  -Prednisone 60 mg and decrease by 10 mg every other day  -Must have O2 arranged at home prior to D/C  -Usual pulm meds  -F/U in our office for bx results and labwork previously ordered as outpt  -Pain control

## 2018-02-03 NOTE — DISCHARGE NOTE ADULT - MEDICATION SUMMARY - MEDICATIONS TO STOP TAKING
I will STOP taking the medications listed below when I get home from the hospital:    ProAir HFA  --  inhaled

## 2018-02-03 NOTE — PROGRESS NOTE ADULT - ASSESSMENT
HPI:  72 year old male presents with c/o difficulty breathing at times over past year, worse over past 6 months. Pt went to ER on 1/14/18 with difficulty breathing , ct scan done and emphysematous changes noted. Pt uses nebulizer and inhaler at home as needed. Denies any difficulty breathing at present , dry cough usually in AM > Scheduled for right VAts lung resection with DR. Martinez .Pt states he needs biopsy for diagnosis. (26 Jan 2018 10:47)    2/2 flex bronch right VATS, wedge resection x 2, chest tube placement    2/3 chest tube removed. acute respiratory insufficiency with oxygen desaturations at rest on room air. Followed by pulmonary for asthma, bronchiectasis. Currently on steroids.    Overall plan  Patient will need oxygen for discharge  May need one more day to recover due to significant MCKEE (patient lives alone)  Will followup post removal cxr  Discussed with Dr Duran in AM rounds HPI:  72 year old male presents with c/o difficulty breathing at times over past year, worse over past 6 months. Pt went to ER on 1/14/18 with difficulty breathing , ct scan done and emphysematous changes noted. Pt uses nebulizer and inhaler at home as needed. Denies any difficulty breathing at present , dry cough usually in AM > Scheduled for right VAts lung resection with DR. Martinez .Pt states he needs biopsy for diagnosis. (26 Jan 2018 10:47)    2/2 flex bronch right VATS, wedge resection x 2, chest tube placement    2/3 chest tube removed. acute respiratory insufficiency with oxygen desaturations at rest on room air. Followed by pulmonary for asthma, bronchiectasis and pulmonary fibrosis. Currently on steroids.    Overall plan  Patient will need oxygen for discharge  May need one more day to recover due to significant MCKEE (patient lives alone)  Will followup post removal cxr  Discussed with Dr Duran in AM rounds

## 2018-02-03 NOTE — DISCHARGE NOTE ADULT - NS AS ACTIVITY OBS
Showering allowed/Walking-Outdoors allowed/No Heavy lifting/straining/Stairs allowed/Walking-Indoors allowed/Do not make important decisions/Do not drive or operate machinery

## 2018-02-03 NOTE — DISCHARGE NOTE ADULT - CARE PROVIDERS DIRECT ADDRESSES
,[email or web address]

## 2018-02-03 NOTE — DISCHARGE NOTE ADULT - CARE PLAN
Principal Discharge DX:	Pulmonary fibrosis  Goal:	Recover from surgery  Assessment and plan of treatment:	Leave dressing intact until tomorrow morning, reinforcing with tape if necessary (about 48 hours from chest tube removal). At that time you may remove the dressing and take a shower. Place clean gauze over wound if continual drainage. Call Dr. Chacon's office at [phone] tomorrow or the next business day to make a followup appointment. Call the office if you experience any fevers, shortness of breath, chest pain or excessive drainage from the incision, day or night. Go to the emergency room if any of these symptoms are severe. Take your medications as ordered and take a stool softener if needed with the narcotic medications.

## 2018-02-03 NOTE — DISCHARGE NOTE ADULT - MEDICATION SUMMARY - MEDICATIONS TO TAKE
I will START or STAY ON the medications listed below when I get home from the hospital:    DME oxygen homefill and concentrator with refill system  -- O2 sat 88% on room air at rest  O2 at 2 L NC continuous  Dx: Pulmonary fibrosis  TSERING 99 months  -- Indication: For pulmonary fibrosis    predniSONE 10 mg oral tablet  -- Start with Prednisone 50 mg on 2/5, then decrease by 10 mg every other day until weaned off.  -- Indication: For pulmonary fibrosis    Percocet 5/325 oral tablet  -- 1 tab(s) by mouth every 6 hours, As Needed -Moderate Pain (4 - 6) MDD:4  -- Indication: For pain    aspirin 81 mg oral tablet, chewable  -- 1 tab(s) by mouth once a day  -- Indication: For Aspirin    lisinopril 20 mg oral tablet  -- 1 tab(s) by mouth once a day  -- Indication: For Hypertension    tamsulosin 0.4 mg oral capsule  -- 1 cap(s) by mouth once a day (at bedtime)  -- Indication: For BPH    LORazepam 0.5 mg oral tablet  -- 1 tab(s) by mouth 2 times a day, As needed, Anxiety  -- Indication: For Anxiety    Advair Diskus  --  inhaled   -- Indication: For Bronchiectasis    amLODIPine 5 mg oral tablet  -- 1 tab(s) by mouth once a day  -- Indication: For Hypertension    docusate sodium 100 mg oral capsule  -- 1 cap(s) by mouth 3 times a day  -- Indication: For constipation

## 2018-02-03 NOTE — DISCHARGE NOTE ADULT - HOSPITAL COURSE
72 year old male presents with c/o difficulty breathing at times over past year, worse over past 6 months. Pt went to ER on 1/14/18 with difficulty breathing , ct scan done and emphysematous changes noted. Pt uses nebulizer and inhaler at home as needed. Denies any difficulty breathing at present , dry cough usually in AM > Scheduled for right VAts lung resection with DR. Martinez .Pt states he needs biopsy for diagnosis. (26 Jan 2018 10:47)    2/2 flex bronch right VATS, wedge resection x 2, chest tube placement    2/3 chest tube removed. acute respiratory insufficiency with oxygen desaturations at rest on room air. Followed by pulmonary for asthma, bronchiectasis and pulmonary fibrosis. Currently on steroids.

## 2018-02-03 NOTE — DISCHARGE NOTE ADULT - PATIENT PORTAL LINK FT
You can access the [facility] Patient Portal, offered by [facility], by registering with the following website: [email or web address]

## 2018-02-03 NOTE — DISCHARGE NOTE ADULT - PLAN OF CARE
Recover from surgery Leave dressing intact until tomorrow morning, reinforcing with tape if necessary (about 48 hours from chest tube removal). At that time you may remove the dressing and take a shower. Place clean gauze over wound if continual drainage. Call Dr. Chacon's office at [phone] tomorrow or the next business day to make a followup appointment. Call the office if you experience any fevers, shortness of breath, chest pain or excessive drainage from the incision, day or night. Go to the emergency room if any of these symptoms are severe. Take your medications as ordered and take a stool softener if needed with the narcotic medications.

## 2018-02-04 VITALS
OXYGEN SATURATION: 97 % | DIASTOLIC BLOOD PRESSURE: 60 MMHG | RESPIRATION RATE: 18 BRPM | SYSTOLIC BLOOD PRESSURE: 110 MMHG | HEART RATE: 100 BPM

## 2018-02-04 LAB
ANION GAP SERPL CALC-SCNC: 10 MMOL/L — SIGNIFICANT CHANGE UP (ref 5–17)
BUN SERPL-MCNC: 24 MG/DL — HIGH (ref 8–20)
CALCIUM SERPL-MCNC: 9 MG/DL — SIGNIFICANT CHANGE UP (ref 8.6–10.2)
CHLORIDE SERPL-SCNC: 101 MMOL/L — SIGNIFICANT CHANGE UP (ref 98–107)
CO2 SERPL-SCNC: 27 MMOL/L — SIGNIFICANT CHANGE UP (ref 22–29)
CREAT SERPL-MCNC: 0.94 MG/DL — SIGNIFICANT CHANGE UP (ref 0.5–1.3)
GLUCOSE SERPL-MCNC: 129 MG/DL — HIGH (ref 70–115)
HCT VFR BLD CALC: 37.6 % — LOW (ref 42–52)
HGB BLD-MCNC: 12.9 G/DL — LOW (ref 14–18)
MCHC RBC-ENTMCNC: 31.7 PG — HIGH (ref 27–31)
MCHC RBC-ENTMCNC: 34.3 G/DL — SIGNIFICANT CHANGE UP (ref 32–36)
MCV RBC AUTO: 92.4 FL — SIGNIFICANT CHANGE UP (ref 80–94)
PLATELET # BLD AUTO: 160 K/UL — SIGNIFICANT CHANGE UP (ref 150–400)
POTASSIUM SERPL-MCNC: 5 MMOL/L — SIGNIFICANT CHANGE UP (ref 3.5–5.3)
POTASSIUM SERPL-SCNC: 5 MMOL/L — SIGNIFICANT CHANGE UP (ref 3.5–5.3)
RBC # BLD: 4.07 M/UL — LOW (ref 4.6–6.2)
RBC # FLD: 14.1 % — SIGNIFICANT CHANGE UP (ref 11–15.6)
SODIUM SERPL-SCNC: 138 MMOL/L — SIGNIFICANT CHANGE UP (ref 135–145)
WBC # BLD: 14.3 K/UL — HIGH (ref 4.8–10.8)
WBC # FLD AUTO: 14.3 K/UL — HIGH (ref 4.8–10.8)

## 2018-02-04 PROCEDURE — 71045 X-RAY EXAM CHEST 1 VIEW: CPT | Mod: 26

## 2018-02-04 PROCEDURE — 36415 COLL VENOUS BLD VENIPUNCTURE: CPT

## 2018-02-04 PROCEDURE — 80048 BASIC METABOLIC PNL TOTAL CA: CPT

## 2018-02-04 PROCEDURE — 86850 RBC ANTIBODY SCREEN: CPT

## 2018-02-04 PROCEDURE — 94640 AIRWAY INHALATION TREATMENT: CPT

## 2018-02-04 PROCEDURE — 86900 BLOOD TYPING SEROLOGIC ABO: CPT

## 2018-02-04 PROCEDURE — 88307 TISSUE EXAM BY PATHOLOGIST: CPT

## 2018-02-04 PROCEDURE — 71045 X-RAY EXAM CHEST 1 VIEW: CPT

## 2018-02-04 PROCEDURE — 86923 COMPATIBILITY TEST ELECTRIC: CPT

## 2018-02-04 PROCEDURE — 85027 COMPLETE CBC AUTOMATED: CPT

## 2018-02-04 PROCEDURE — 87070 CULTURE OTHR SPECIMN AEROBIC: CPT

## 2018-02-04 PROCEDURE — 86901 BLOOD TYPING SEROLOGIC RH(D): CPT

## 2018-02-04 PROCEDURE — 87075 CULTR BACTERIA EXCEPT BLOOD: CPT

## 2018-02-04 PROCEDURE — 99238 HOSP IP/OBS DSCHRG MGMT 30/<: CPT

## 2018-02-04 RX ADMIN — Medication 100 MILLIGRAM(S): at 05:25

## 2018-02-04 RX ADMIN — Medication 100 MILLIGRAM(S): at 11:05

## 2018-02-04 RX ADMIN — SODIUM CHLORIDE 3 MILLILITER(S): 9 INJECTION INTRAMUSCULAR; INTRAVENOUS; SUBCUTANEOUS at 09:22

## 2018-02-04 RX ADMIN — Medication 0.5 MILLIGRAM(S): at 09:24

## 2018-02-04 RX ADMIN — OXYCODONE AND ACETAMINOPHEN 1 TABLET(S): 5; 325 TABLET ORAL at 05:26

## 2018-02-04 RX ADMIN — Medication 1 ENEMA: at 10:24

## 2018-02-04 RX ADMIN — SODIUM CHLORIDE 3 MILLILITER(S): 9 INJECTION INTRAMUSCULAR; INTRAVENOUS; SUBCUTANEOUS at 05:18

## 2018-02-04 RX ADMIN — Medication 81 MILLIGRAM(S): at 09:24

## 2018-02-04 RX ADMIN — AMLODIPINE BESYLATE 5 MILLIGRAM(S): 2.5 TABLET ORAL at 05:25

## 2018-02-04 RX ADMIN — TAMSULOSIN HYDROCHLORIDE 0.4 MILLIGRAM(S): 0.4 CAPSULE ORAL at 05:27

## 2018-02-04 RX ADMIN — LISINOPRIL 20 MILLIGRAM(S): 2.5 TABLET ORAL at 05:25

## 2018-02-04 RX ADMIN — Medication 60 MILLIGRAM(S): at 05:25

## 2018-02-04 NOTE — PROGRESS NOTE ADULT - ASSESSMENT
HPI:  72 year old male presents with c/o difficulty breathing at times over past year, worse over past 6 months. Pt went to ER on 1/14/18 with difficulty breathing , ct scan done and emphysematous changes noted. Pt uses nebulizer and inhaler at home as needed. Denies any difficulty breathing at present , dry cough usually in AM > Scheduled for right VAts lung resection with DR. Martinez .Pt states he needs biopsy for diagnosis. (26 Jan 2018 10:47)    2/2 flex bronch right VATS, wedge resection x 2, chest tube placement    2/3 chest tube removed. acute respiratory insufficiency with oxygen desaturations at rest on room air. Followed by pulmonary for asthma, bronchiectasis and pulmonary fibrosis. Currently on steroids.  2/4 stable for D/C home with oxygen    Overall plan  Patient will need oxygen for discharge  Steroid taper  Home O2  Discussed with Dr Durna in AM rounds

## 2018-02-04 NOTE — PROGRESS NOTE ADULT - SUBJECTIVE AND OBJECTIVE BOX
PULMONARY PROGRESS NOTE      [id number]    Patient is a 72y old  Male who presents with a chief complaint of " I need a lung biopsy done" (26 Jan 2018 11:11)      INTERVAL HPI/OVERNIGHT EVENTS: SOB; says he does not feel much different than he had been feeling lately. Very anxious. Chest tube removed.    MEDICATIONS  (STANDING):  amLODIPine   Tablet 5 milliGRAM(s) Oral daily  aspirin  chewable 81 milliGRAM(s) Oral daily  docusate sodium 100 milliGRAM(s) Oral three times a day  enoxaparin Injectable 40 milliGRAM(s) SubCutaneous every 24 hours  fentaNYL PCA (50 MICROgram(s)/mL) 30 milliLiter(s) PCA Continuous PCA Continuous  lactated ringers. 1000 milliLiter(s) (30 mL/Hr) IV Continuous <Continuous>  lisinopril 20 milliGRAM(s) Oral daily  methylPREDNISolone sodium succinate Injectable 40 milliGRAM(s) IV Push four times a day  sodium chloride 0.9% lock flush 3 milliLiter(s) IV Push every 8 hours  tamsulosin 0.4 milliGRAM(s) Oral at bedtime      MEDICATIONS  (PRN):  acetaminophen  IVPB. 1000 milliGRAM(s) IV Intermittent once PRN Mild Pain (1 - 3)  ALBUTerol    90 MICROgram(s) HFA Inhaler 2 Puff(s) Inhalation every 6 hours PRN Shortness of Breath and/or Wheezing  ketorolac   Injectable 30 milliGRAM(s) IV Push once PRN Moderate Pain  LORazepam     Tablet 0.5 milliGRAM(s) Oral two times a day PRN Anxiety  naloxone Injectable 0.1 milliGRAM(s) IV Push every 3 minutes PRN For ANY of the following changes in patient status:  A. RR LESS THAN 10 breaths per minute, B. Oxygen saturation LESS THAN 90%, C. Sedation score of 6  ondansetron Injectable 4 milliGRAM(s) IV Push every 6 hours PRN Nausea      Allergies    No Known Allergies    Intolerances        PAST MEDICAL & SURGICAL HISTORY:  Bronchiectasis  Dyspnea  Asthma  BPH (benign prostatic hyperplasia)  Anxiety  HTN (hypertension)  H/O reduction of nasal fracture: deviated septum  History of incisional hernia repair  S/P appendectomy      SOCIAL HISTORY  Smoking History: never smoker      REVIEW OF SYSTEMS:    CONSTITUTIONAL:  No distress    HEENT:  Eyes:  No diplopia or blurred vision. ENT:  No earache, sore throat or runny nose.    CARDIOVASCULAR:  No pressure, squeezing, tightness, heaviness or aching about the chest; no palpitations.    RESPIRATORY:  per HPI     GASTROINTESTINAL:  No nausea, vomiting or diarrhea.    GENITOURINARY:  No dysuria, frequency or urgency.    NEUROLOGIC:  No paresthesias, fasciculations, seizures or weakness.    PSYCHIATRIC:  anxiety    Vital Signs Last 24 Hrs  T(C): 36.7 (03 Feb 2018 10:51), Max: 36.9 (02 Feb 2018 14:31)  T(F): 98.1 (03 Feb 2018 10:51), Max: 98.5 (02 Feb 2018 15:44)  HR: 103 (03 Feb 2018 10:51) (70 - 103)  BP: 159/75 (03 Feb 2018 10:51) (96/52 - 159/75)  BP(mean): --  RR: 18 (03 Feb 2018 11:16) (18 - 29)  SpO2: 88% (03 Feb 2018 11:16) (88% - 100%)    PHYSICAL EXAMINATION:    GENERAL: The patient is awake and alert in no apparent distress.     HEENT: Head is normocephalic and atraumatic. Extraocular muscles are intact. Mucous membranes are moist.    NECK: Supple.    LUNGS: crackles at bases, respirations unlabored    HEART: Regular rate and rhythm without murmur.    ABDOMEN: Soft, nontender, and nondistended.      EXTREMITIES: Without any cyanosis, clubbing, rash, lesions or edema.    NEUROLOGIC: Grossly intact.    LABS:                        13.3   8.4   )-----------( 146      ( 03 Feb 2018 05:45 )             38.8     02-03    137  |  98  |  21.0<H>  ----------------------------<  174<H>  4.8   |  24.0  |  1.17    Ca    8.9      03 Feb 2018 05:45         RADIOLOGY & ADDITIONAL STUDIES:  < from: Xray Chest 1 View- PORTABLE-Routine (02.03.18 @ 04:43) >     EXAM:  XR CHEST PORTABLE ROUTINE 1V                          PROCEDURE DATE:  02/03/2018          INTERPRETATION:  Portable chest radiograph        CLINICAL INFORMATION:   Right chest tube. Follow-up.    TECHNIQUE:  Portable  AP view of the chest was obtained.    COMPARISON: 2/2/2018 available for review.    FINDINGS:   The lungs  show mild increased incision markings similar to prior   examination. Right chest tube in place. No pneumothorax. No pleural   effusion.    The  heart is enlarged intransverse diameter. No hilar mass. Trachea   midline.             Visualized osseous structures are intact.        IMPRESSION:   No interval change. ..                          BRITTANY MARSHALL M.D., ATTENDING RADIOLOGIST    < end of copied text >
Subjective: "I feel a little short of breath when I move around." Patient appeared comfortable in the chair but upon ambulating to the bed, the patient was visibly short of breath and pulseox reading 79%. Improved to 92% on 3 L NC.    VITAL SIGNS  Vital Signs Last 24 Hrs  T(C): 36.4 (18 @ 05:25), Max: 36.9 (18 @ 14:31)  T(F): 97.6 (18 @ 05:25), Max: 98.5 (18 @ 15:44)  HR: 89 (18 @ 05:25) (70 - 102)  BP: 140/60 (18 @ 05:25) (96/52 - 148/60)  RR: 19 (18 @ 05:25) (18 - 29)  SpO2: 92% (18 @ 05:25) (92% - 100%)  on 3L NC            Telemetry/Alarms:  SR 90s. Did have bradycardia this morning around 5am while sleeping and is tachycardic with exertion around 100 bpm    MEDICATIONS  acetaminophen  IVPB. 1000 milliGRAM(s) IV Intermittent once PRN  ALBUTerol    90 MICROgram(s) HFA Inhaler 2 Puff(s) Inhalation every 6 hours PRN  amLODIPine   Tablet 5 milliGRAM(s) Oral daily  aspirin  chewable 81 milliGRAM(s) Oral daily  docusate sodium 100 milliGRAM(s) Oral three times a day  enoxaparin Injectable 40 milliGRAM(s) SubCutaneous every 24 hours  fentaNYL PCA (50 MICROgram(s)/mL) 30 milliLiter(s) PCA Continuous PCA Continuous  ketorolac   Injectable 30 milliGRAM(s) IV Push once PRN  lactated ringers. 1000 milliLiter(s) IV Continuous <Continuous>  lisinopril 20 milliGRAM(s) Oral daily  LORazepam     Tablet 0.5 milliGRAM(s) Oral two times a day PRN  methylPREDNISolone sodium succinate Injectable 40 milliGRAM(s) IV Push four times a day  naloxone Injectable 0.1 milliGRAM(s) IV Push every 3 minutes PRN  ondansetron Injectable 4 milliGRAM(s) IV Push every 6 hours PRN  sodium chloride 0.9% lock flush 3 milliLiter(s) IV Push every 8 hours  tamsulosin 0.4 milliGRAM(s) Oral at bedtime      PHYSICAL EXAM  General: well nourished, well developed, no acute distress  Neurology: alert and oriented x 3, nonfocal, no gross deficits  Respiratory: diminished right chest, otherwise clear  CV: regular rate and rhythm, normal S1, S2  Abdomen: soft, nontender, mildly distended, positive bowel sounds, last bowel movement preop  Extremities: warm, well perfused. no edema. + DP pulses  Incisions: right lateral incision c/d/i  Chest tubes: Right lateral chest tube with small amount red serosanguinous drainage. No air leak noted. Chest tube removed without difficulty. Chest xray pending.       @ 07:01  -  - @ 07:00  --------------------------------------------------------  IN: 450 mL / OUT: 590 mL / NET: -140 mL        Weights:  Daily     Daily Weight in k.8 (2018 05:17)  Admit Wt: Drug Dosing Weight  Height (cm): 180.34 (2018 10:24)  Weight (kg): 109.2 (2018 10:24)  BMI (kg/m2): 33.6 (2018 10:24)  BSA (m2): 2.28 (2018 10:24)    LABS  -    137  |  98  |  21.0<H>  ----------------------------<  174<H>  4.8   |  24.0  |  1.17    Ca    8.9      2018 05:45                                   13.3   8.4   )-----------( 146      ( 2018 05:45 )             38.8                   Today's CXR:   < from: Xray Chest 1 View- PORTABLE-Routine (18 @ 04:43) >  FINDINGS:   The lungs  show mild increased incision markings similar to prior   examination. Right chest tube in place. No pneumothorax. No pleural   effusion.    The  heart is enlarged intransverse diameter. No hilar mass. Trachea   midline.             Visualized osseous structures are intact.        IMPRESSION:   No interval change. ..          < end of copied text >    PAST MEDICAL & SURGICAL HISTORY:  Bronchiectasis  Dyspnea  Asthma  BPH (benign prostatic hyperplasia)  Anxiety  HTN (hypertension)  H/O reduction of nasal fracture: deviated septum  History of incisional hernia repair  S/P appendectomy
Subjective: Complains of constipation.  Sats in low 90's on 2L NC.  +MCKEE, but not SOB at rest.  Denies chest pain.    VITAL SIGNS  Vital Signs Last 24 Hrs  Vital Signs Last 24 Hrs  T(C): 36.6 (2018 05:24), Max: 36.7 (2018 10:51)  T(F): 97.9 (2018 05:24), Max: 98.1 (2018 10:51)  HR: 79 (2018 05:24) (71 - 105)  BP: 130/60 (2018 05:24) (124/68 - 159/75)  BP(mean): --  RR: 19 (2018 05:24) (18 - 20)  SpO2: 95% (2018 05:24) (88% - 97%)    MEDICATIONS  (STANDING):  amLODIPine   Tablet 5 milliGRAM(s) Oral daily  aspirin  chewable 81 milliGRAM(s) Oral daily  docusate sodium 100 milliGRAM(s) Oral three times a day  enoxaparin Injectable 40 milliGRAM(s) SubCutaneous every 24 hours  lactated ringers. 1000 milliLiter(s) (30 mL/Hr) IV Continuous <Continuous>  lisinopril 20 milliGRAM(s) Oral daily  predniSONE   Tablet 60 milliGRAM(s) Oral daily  sodium chloride 0.9% lock flush 3 milliLiter(s) IV Push every 8 hours  tamsulosin 0.4 milliGRAM(s) Oral at bedtime    MEDICATIONS  (PRN):  ALBUTerol    90 MICROgram(s) HFA Inhaler 2 Puff(s) Inhalation every 6 hours PRN Shortness of Breath and/or Wheezing  ketorolac   Injectable 30 milliGRAM(s) IV Push once PRN Moderate Pain  LORazepam     Tablet 0.5 milliGRAM(s) Oral two times a day PRN Anxiety  oxyCODONE    5 mG/acetaminophen 325 mG 1 Tablet(s) Oral every 4 hours PRN Moderate Pain (4 - 6)  oxyCODONE    5 mG/acetaminophen 325 mG 2 Tablet(s) Oral every 6 hours PRN Severe Pain (7 - 10)    PHYSICAL EXAM  General: well nourished, well developed, no acute distress  Neurology: alert and oriented x 3, nonfocal, no gross deficits  Respiratory: Bibasilar rales, unlabored  CV: regular rate and rhythm, normal S1, S2  Abdomen: soft, nontender, mildly distended, positive bowel sounds, last bowel movement preop  Extremities: warm, well perfused. no edema. + DP pulses  Incisions: right lateral incision c/d/i  Chest tubes: none         @ 07:01  -  02- @ 07:00  --------------------------------------------------------  IN: 600 mL / OUT: 250 mL / NET: 350 mL        Weights:  Daily     Daily Weight in k.8 (2018 05:17)  Admit Wt: Drug Dosing Weight  Height (cm): 180.34 (2018 10:24)  Weight (kg): 109.2 (2018 10:24)  BMI (kg/m2): 33.6 (2018 10:24)  BSA (m2): 2.28 (2018 10:24)    LABS                        12.9   14.3  )-----------( 160      ( 2018 05:23 )             37.6   138  |  101  |  24.0<H>  ----------------------------<  129<H>  5.0   |  27.0  |  0.94            PAST MEDICAL & SURGICAL HISTORY:  Bronchiectasis  Dyspnea  Asthma  BPH (benign prostatic hyperplasia)  Anxiety  HTN (hypertension)  H/O reduction of nasal fracture: deviated septum  History of incisional hernia repair  S/P appendectomy
PULMONARY PROGRESS NOTE      LILY AWADNAEEM-[id number]    Patient is a 72y old  Male who presents with a chief complaint of " I need a lung biopsy done" (26 Jan 2018 11:11)      INTERVAL HPI/OVERNIGHT EVENTS: Presented to the office for consultation for SOB, abnormal CT. Prior to coming to our office, he had been to [facility] ER in December and then in January with SOB, cough. CT chest done in the ER showed pulmonary fibrosis. PFTs showed moderate restriction. Sent to Dr Chacon for biopsy. He is now s/p lung bx on R via VATS. Chest tube in place. The patient says he is feeling at baseline. Very anxious. No wheeze, hemoptysis.     MEDICATIONS  (STANDING):  ALBUTerol/ipratropium for Nebulization 3 milliLiter(s) Nebulizer every 6 hours  docusate sodium 100 milliGRAM(s) Oral three times a day  fentaNYL PCA (50 MICROgram(s)/mL) 30 milliLiter(s) PCA Continuous PCA Continuous  lactated ringers. 1000 milliLiter(s) (30 mL/Hr) IV Continuous <Continuous>  sodium chloride 0.9% lock flush 3 milliLiter(s) IV Push every 8 hours  sodium chloride 0.9%. 1000 milliLiter(s) (50 mL/Hr) IV Continuous <Continuous>  tamsulosin 0.4 milliGRAM(s) Oral at bedtime      MEDICATIONS  (PRN):  acetaminophen  IVPB. 1000 milliGRAM(s) IV Intermittent once PRN Mild Pain (1 - 3)  fentaNYL    Injectable 50 MICROGram(s) IV Push every 5 minutes PRN Severe Pain  ketorolac   Injectable 30 milliGRAM(s) IV Push once PRN Moderate Pain  LORazepam     Tablet 0.5 milliGRAM(s) Oral two times a day PRN Anxiety  naloxone Injectable 0.1 milliGRAM(s) IV Push every 3 minutes PRN For ANY of the following changes in patient status:  A. RR LESS THAN 10 breaths per minute, B. Oxygen saturation LESS THAN 90%, C. Sedation score of 6  ondansetron Injectable 4 milliGRAM(s) IV Push every 6 hours PRN Nausea  ondansetron Injectable 4 milliGRAM(s) IV Push once PRN Nausea and/or Vomiting      Allergies    No Known Allergies    Intolerances        PAST MEDICAL & SURGICAL HISTORY:  Bronchiectasis  Dyspnea  Asthma  BPH (benign prostatic hyperplasia)  Anxiety  HTN (hypertension)  H/O reduction of nasal fracture: deviated septum  History of incisional hernia repair  S/P appendectomy      SOCIAL HISTORY  Smoking History: never smoker      REVIEW OF SYSTEMS:    CONSTITUTIONAL:  No distress    HEENT:  Eyes:  No diplopia or blurred vision. ENT:  No earache, sore throat or runny nose.    CARDIOVASCULAR:  No pressure, squeezing, tightness, heaviness or aching about the chest; no palpitations.    RESPIRATORY:  per HPI     GASTROINTESTINAL:  No nausea, vomiting or diarrhea.    GENITOURINARY:  No dysuria, frequency or urgency.    NEUROLOGIC:  No paresthesias, fasciculations, seizures or weakness.    PSYCHIATRIC:  No disorder of thought or mood.    Vital Signs Last 24 Hrs  T(C): 36.9 (02 Feb 2018 14:31), Max: 36.9 (02 Feb 2018 14:31)  T(F): 98.4 (02 Feb 2018 14:31), Max: 98.4 (02 Feb 2018 14:31)  HR: 84 (02 Feb 2018 14:46) (70 - 99)  BP: 106/64 (02 Feb 2018 14:46) (96/52 - 138/86)  BP(mean): --  RR: 22 (02 Feb 2018 14:46) (18 - 29)  SpO2: 99% (02 Feb 2018 14:46) (96% - 100%)    PHYSICAL EXAMINATION:    GENERAL: The patient is awake and alert in no apparent distress.     HEENT: Head is normocephalic and atraumatic. Extraocular muscles are intact. Mucous membranes are moist.    NECK: Supple.    LUNGS: Decreased BS r base, Chest tube in place, crackles on L base, respirations unlabored    HEART: Regular rate and rhythm      ABDOMEN: Soft, nontender, and nondistended.      EXTREMITIES: Without any cyanosis, clubbing, rash, lesions or edema.    NEUROLOGIC: Grossly intact.         RADIOLOGY & ADDITIONAL STUDIES:  < from: CT Angio Chest w/ IV Cont (01.14.18 @ 14:23) >     EXAM:  CT ANGIO CHEST (W)AW IC                          PROCEDURE DATE:  01/14/2018          INTERPRETATION:  CT CHEST WITH IV CONTRAST:    HISTORY: Shortness of breath. Evaluate for pulmonary emboli..    Date and time of exam: 1/14/2018 2:18 PM.    TECHNIQUE:  Sections were obtained from the apices to the diaphragm   following intravenous contrast.  81 mls of omnipaque 350 was administered   intravenously without complication. MIP images were obtained and reviewed.    COMPARISON EXAMINATION: 1/8/2016.    FINDINGS:  No evidence of mediastinal or hilar lymphadenopathy. No evidence of   pleural or pericardial effusion. No evidence of cardiomegaly. No evidence   of axillary adenopathy.    No evidence of pulmonary emboli.    Bilateral emphysematous changes. Bibasilar bronchiectasis. No evidence of   pulmonary consolidation.    No significant osseous abnormality..          IMPRESSION:      No evidence of pulmonary emboli.    Emphysematous changes as well as bibasilar bronchiectasis..                ASHER FISHER M.D., ATTENDING RADIOLOGIST    < end of copied text >  < from: Xray Chest 1 View- PORTABLE-Urgent (02.02.18 @ 13:03) >     EXAM:  XR CHEST PORTABLE URGENT 1V                          PROCEDURE DATE:  02/02/2018          INTERPRETATION:  Portable chest radiograph dated 2/2/18.    COMPARISON: 1/14/2018.    CLINICAL INFORMATION: Chest tube placement.  FINDINGS:    The airway is midline.  Interval placement of a right chest tube.   There is a small right apical pneumothorax. Chronic interstitial lung   disease is again noted.  There is no pleural effusion . The questionable small pneumoperitoneum   under the right hemidiaphragm.  The cardiac silhouette is normal size.   The bones are normal. Soft tissue emphysema in the right lower chest   wall.     IMPRESSION:  Interval placement of the right chest tube.   There is a small right apical pneumothorax.   A questionable small pneumoperitoneum under the right hemidiaphragm.                ANDIE REICH M.D., ATTENDING RADIOLOGIST    < end of copied text >

## 2018-02-08 LAB
CULTURE RESULTS: SIGNIFICANT CHANGE UP
CULTURE RESULTS: SIGNIFICANT CHANGE UP
SPECIMEN SOURCE: SIGNIFICANT CHANGE UP
SPECIMEN SOURCE: SIGNIFICANT CHANGE UP
SURGICAL PATHOLOGY FINAL REPORT - CH: SIGNIFICANT CHANGE UP

## 2018-02-12 ENCOUNTER — APPOINTMENT (OUTPATIENT)
Dept: THORACIC SURGERY | Facility: CLINIC | Age: 73
End: 2018-02-12
Payer: MEDICARE

## 2018-02-12 ENCOUNTER — OUTPATIENT (OUTPATIENT)
Dept: OUTPATIENT SERVICES | Facility: HOSPITAL | Age: 73
LOS: 1 days | End: 2018-02-12
Payer: MEDICARE

## 2018-02-12 VITALS
OXYGEN SATURATION: 96 % | BODY MASS INDEX: 32.07 KG/M2 | HEART RATE: 105 BPM | HEIGHT: 70 IN | RESPIRATION RATE: 16 BRPM | WEIGHT: 224 LBS | SYSTOLIC BLOOD PRESSURE: 115 MMHG | DIASTOLIC BLOOD PRESSURE: 73 MMHG

## 2018-02-12 DIAGNOSIS — Z98.89 OTHER SPECIFIED POSTPROCEDURAL STATES: Chronic | ICD-10-CM

## 2018-02-12 DIAGNOSIS — Z87.81 PERSONAL HISTORY OF (HEALED) TRAUMATIC FRACTURE: Chronic | ICD-10-CM

## 2018-02-12 DIAGNOSIS — J84.9 INTERSTITIAL PULMONARY DISEASE, UNSPECIFIED: ICD-10-CM

## 2018-02-12 PROCEDURE — 71046 X-RAY EXAM CHEST 2 VIEWS: CPT | Mod: 26

## 2018-02-12 PROCEDURE — 99024 POSTOP FOLLOW-UP VISIT: CPT

## 2018-02-12 PROCEDURE — 71046 X-RAY EXAM CHEST 2 VIEWS: CPT

## 2018-02-13 ENCOUNTER — APPOINTMENT (OUTPATIENT)
Dept: PULMONOLOGY | Facility: CLINIC | Age: 73
End: 2018-02-13
Payer: MEDICARE

## 2018-02-13 VITALS
SYSTOLIC BLOOD PRESSURE: 118 MMHG | WEIGHT: 226 LBS | BODY MASS INDEX: 32.35 KG/M2 | HEART RATE: 110 BPM | OXYGEN SATURATION: 94 % | DIASTOLIC BLOOD PRESSURE: 64 MMHG | HEIGHT: 70 IN

## 2018-02-13 VITALS — OXYGEN SATURATION: 95 %

## 2018-02-13 DIAGNOSIS — E66.9 OBESITY, UNSPECIFIED: ICD-10-CM

## 2018-02-13 DIAGNOSIS — R06.89 OTHER ABNORMALITIES OF BREATHING: ICD-10-CM

## 2018-02-13 DIAGNOSIS — J93.9 PNEUMOTHORAX, UNSPECIFIED: ICD-10-CM

## 2018-02-13 DIAGNOSIS — J84.9 INTERSTITIAL PULMONARY DISEASE, UNSPECIFIED: ICD-10-CM

## 2018-02-13 DIAGNOSIS — N40.0 BENIGN PROSTATIC HYPERPLASIA WITHOUT LOWER URINARY TRACT SYMPTOMS: ICD-10-CM

## 2018-02-13 DIAGNOSIS — Z79.82 LONG TERM (CURRENT) USE OF ASPIRIN: ICD-10-CM

## 2018-02-13 DIAGNOSIS — J45.909 UNSPECIFIED ASTHMA, UNCOMPLICATED: ICD-10-CM

## 2018-02-13 DIAGNOSIS — F41.9 ANXIETY DISORDER, UNSPECIFIED: ICD-10-CM

## 2018-02-13 DIAGNOSIS — Z09 ENCOUNTER FOR FOLLOW-UP EXAMINATION AFTER COMPLETED TREATMENT FOR CONDITIONS OTHER THAN MALIGNANT NEOPLASM: ICD-10-CM

## 2018-02-13 DIAGNOSIS — J84.10 PULMONARY FIBROSIS, UNSPECIFIED: ICD-10-CM

## 2018-02-13 DIAGNOSIS — I10 ESSENTIAL (PRIMARY) HYPERTENSION: ICD-10-CM

## 2018-02-13 PROCEDURE — 99215 OFFICE O/P EST HI 40 MIN: CPT

## 2018-03-14 ENCOUNTER — OTHER (OUTPATIENT)
Age: 73
End: 2018-03-14

## 2018-04-12 ENCOUNTER — APPOINTMENT (OUTPATIENT)
Dept: PULMONOLOGY | Facility: CLINIC | Age: 73
End: 2018-04-12
Payer: MEDICARE

## 2018-04-12 VITALS — HEART RATE: 100 BPM | OXYGEN SATURATION: 96 %

## 2018-04-12 VITALS — HEIGHT: 70.2 IN | WEIGHT: 231 LBS | BODY MASS INDEX: 33.07 KG/M2

## 2018-04-12 VITALS — SYSTOLIC BLOOD PRESSURE: 134 MMHG | DIASTOLIC BLOOD PRESSURE: 84 MMHG

## 2018-04-12 DIAGNOSIS — R05 COUGH: ICD-10-CM

## 2018-04-12 PROCEDURE — 99214 OFFICE O/P EST MOD 30 MIN: CPT

## 2018-04-17 ENCOUNTER — APPOINTMENT (OUTPATIENT)
Dept: PULMONOLOGY | Facility: CLINIC | Age: 73
End: 2018-04-17

## 2018-06-05 ENCOUNTER — APPOINTMENT (OUTPATIENT)
Dept: CARDIOLOGY | Facility: CLINIC | Age: 73
End: 2018-06-05
Payer: MEDICARE

## 2018-06-05 ENCOUNTER — NON-APPOINTMENT (OUTPATIENT)
Age: 73
End: 2018-06-05

## 2018-06-05 VITALS
OXYGEN SATURATION: 97 % | WEIGHT: 232 LBS | BODY MASS INDEX: 33.1 KG/M2 | HEART RATE: 99 BPM | DIASTOLIC BLOOD PRESSURE: 82 MMHG | SYSTOLIC BLOOD PRESSURE: 178 MMHG

## 2018-06-05 VITALS — SYSTOLIC BLOOD PRESSURE: 140 MMHG | DIASTOLIC BLOOD PRESSURE: 80 MMHG

## 2018-06-05 PROCEDURE — 99213 OFFICE O/P EST LOW 20 MIN: CPT

## 2018-06-05 PROCEDURE — 93000 ELECTROCARDIOGRAM COMPLETE: CPT

## 2018-06-25 ENCOUNTER — APPOINTMENT (OUTPATIENT)
Dept: CARDIOLOGY | Facility: CLINIC | Age: 73
End: 2018-06-25
Payer: MEDICARE

## 2018-06-25 PROCEDURE — 93306 TTE W/DOPPLER COMPLETE: CPT

## 2018-10-19 ENCOUNTER — APPOINTMENT (OUTPATIENT)
Dept: PULMONOLOGY | Facility: CLINIC | Age: 73
End: 2018-10-19
Payer: MEDICARE

## 2018-10-19 VITALS — DIASTOLIC BLOOD PRESSURE: 88 MMHG | SYSTOLIC BLOOD PRESSURE: 140 MMHG

## 2018-10-19 VITALS — OXYGEN SATURATION: 95 % | HEART RATE: 105 BPM

## 2018-10-19 VITALS — BODY MASS INDEX: 33.95 KG/M2 | WEIGHT: 238 LBS

## 2018-10-19 PROBLEM — R06.00 DYSPNEA, UNSPECIFIED: Chronic | Status: ACTIVE | Noted: 2018-01-26

## 2018-10-19 PROBLEM — J47.9 BRONCHIECTASIS, UNCOMPLICATED: Chronic | Status: ACTIVE | Noted: 2018-01-26

## 2018-10-19 PROCEDURE — 99214 OFFICE O/P EST MOD 30 MIN: CPT

## 2019-01-14 ENCOUNTER — FORM ENCOUNTER (OUTPATIENT)
Age: 74
End: 2019-01-14

## 2019-01-15 ENCOUNTER — OUTPATIENT (OUTPATIENT)
Dept: OUTPATIENT SERVICES | Facility: HOSPITAL | Age: 74
LOS: 1 days | End: 2019-01-15
Payer: MEDICARE

## 2019-01-15 ENCOUNTER — APPOINTMENT (OUTPATIENT)
Dept: CT IMAGING | Facility: CLINIC | Age: 74
End: 2019-01-15
Payer: MEDICARE

## 2019-01-15 DIAGNOSIS — Z98.89 OTHER SPECIFIED POSTPROCEDURAL STATES: Chronic | ICD-10-CM

## 2019-01-15 DIAGNOSIS — J84.10 PULMONARY FIBROSIS, UNSPECIFIED: ICD-10-CM

## 2019-01-15 DIAGNOSIS — J84.112 IDIOPATHIC PULMONARY FIBROSIS: ICD-10-CM

## 2019-01-15 DIAGNOSIS — Z87.81 PERSONAL HISTORY OF (HEALED) TRAUMATIC FRACTURE: Chronic | ICD-10-CM

## 2019-01-15 PROCEDURE — 71250 CT THORAX DX C-: CPT

## 2019-01-15 PROCEDURE — 71250 CT THORAX DX C-: CPT | Mod: 26

## 2019-04-18 ENCOUNTER — APPOINTMENT (OUTPATIENT)
Dept: PULMONOLOGY | Facility: CLINIC | Age: 74
End: 2019-04-18
Payer: MEDICARE

## 2019-04-18 VITALS
OXYGEN SATURATION: 93 % | BODY MASS INDEX: 33.64 KG/M2 | DIASTOLIC BLOOD PRESSURE: 80 MMHG | WEIGHT: 235 LBS | SYSTOLIC BLOOD PRESSURE: 160 MMHG | HEIGHT: 70 IN | HEART RATE: 110 BPM

## 2019-04-18 DIAGNOSIS — F41.9 ANXIETY DISORDER, UNSPECIFIED: ICD-10-CM

## 2019-04-18 PROCEDURE — 99214 OFFICE O/P EST MOD 30 MIN: CPT | Mod: 25

## 2019-04-18 PROCEDURE — 94010 BREATHING CAPACITY TEST: CPT

## 2019-04-18 PROCEDURE — 94727 GAS DIL/WSHOT DETER LNG VOL: CPT

## 2019-04-18 PROCEDURE — 85018 HEMOGLOBIN: CPT | Mod: QW

## 2019-04-18 PROCEDURE — 94729 DIFFUSING CAPACITY: CPT

## 2019-04-26 ENCOUNTER — APPOINTMENT (OUTPATIENT)
Dept: PULMONOLOGY | Facility: CLINIC | Age: 74
End: 2019-04-26

## 2019-06-24 RX ORDER — FLUTICASONE PROPIONATE AND SALMETEROL 50; 250 UG/1; UG/1
250-50 POWDER RESPIRATORY (INHALATION)
Refills: 0 | Status: ACTIVE | COMMUNITY
Start: 2017-03-24

## 2019-06-24 RX ORDER — ALBUTEROL SULFATE 90 UG/1
108 (90 BASE) AEROSOL, METERED RESPIRATORY (INHALATION) EVERY 6 HOURS
Refills: 0 | Status: ACTIVE | COMMUNITY
Start: 2017-03-24

## 2019-06-25 ENCOUNTER — APPOINTMENT (OUTPATIENT)
Dept: CARDIOLOGY | Facility: CLINIC | Age: 74
End: 2019-06-25
Payer: MEDICARE

## 2019-06-25 ENCOUNTER — NON-APPOINTMENT (OUTPATIENT)
Age: 74
End: 2019-06-25

## 2019-06-25 VITALS
DIASTOLIC BLOOD PRESSURE: 77 MMHG | WEIGHT: 235 LBS | HEART RATE: 84 BPM | SYSTOLIC BLOOD PRESSURE: 158 MMHG | OXYGEN SATURATION: 96 % | HEIGHT: 70 IN | BODY MASS INDEX: 33.64 KG/M2 | RESPIRATION RATE: 16 BRPM

## 2019-06-25 PROCEDURE — 93000 ELECTROCARDIOGRAM COMPLETE: CPT

## 2019-06-25 PROCEDURE — 99214 OFFICE O/P EST MOD 30 MIN: CPT

## 2019-06-25 RX ORDER — LORAZEPAM 0.5 MG/1
0.5 TABLET ORAL
Qty: 90 | Refills: 0 | Status: ACTIVE | COMMUNITY
Start: 2018-01-08

## 2020-01-07 NOTE — PATIENT PROFILE ADULT. - FUNCTIONAL LEVEL PRIOR: AMBULATION
(0) independent
Instructions: This plan will send the code FBSE to the PM system.  DO NOT or CHANGE the price.
Price (Do Not Change): 0.00
Detail Level: Simple

## 2020-07-07 ENCOUNTER — APPOINTMENT (OUTPATIENT)
Dept: CARDIOLOGY | Facility: CLINIC | Age: 75
End: 2020-07-07
Payer: MEDICARE

## 2020-07-07 ENCOUNTER — NON-APPOINTMENT (OUTPATIENT)
Age: 75
End: 2020-07-07

## 2020-07-07 VITALS
HEART RATE: 94 BPM | BODY MASS INDEX: 32.21 KG/M2 | DIASTOLIC BLOOD PRESSURE: 91 MMHG | TEMPERATURE: 98.2 F | WEIGHT: 225 LBS | OXYGEN SATURATION: 96 % | HEIGHT: 70 IN | SYSTOLIC BLOOD PRESSURE: 168 MMHG

## 2020-07-07 DIAGNOSIS — I10 ESSENTIAL (PRIMARY) HYPERTENSION: ICD-10-CM

## 2020-07-07 PROCEDURE — 99214 OFFICE O/P EST MOD 30 MIN: CPT

## 2020-07-07 PROCEDURE — 93000 ELECTROCARDIOGRAM COMPLETE: CPT

## 2020-07-07 NOTE — HISTORY OF PRESENT ILLNESS
[FreeTextEntry1] : Patient is a 75 yo with history of hypertension, pulmonary fibrosis. Presented to [facility] with episodes of shortness of breath. Cardiac enzymes and BNP normal. \par \par 7/2020- Shortness of breath. Stable. \par No chest pain, no LE edema. \par Blood pressure at home 120s.

## 2020-07-07 NOTE — DISCUSSION/SUMMARY
[FreeTextEntry1] : 1. SOB: ? of secondary to fibrosis. \par TTE with normal LV function in the past. TTE again for follow up for pulmonary hypertension. Since no changes in symptoms, no plan for stress testing. \par 2. Hypertension: Elevated here. Per patient, home BPs stable. Advised to bring machine in for calibration. \par 3. Follow up in 1 year.

## 2020-07-07 NOTE — PHYSICAL EXAM
[General Appearance - Well Developed] : well developed [Normal Conjunctiva] : the conjunctiva exhibited no abnormalities [Normal Oral Mucosa] : normal oral mucosa [Normal Jugular Venous V Waves Present] : normal jugular venous V waves present [Heart Rate And Rhythm] : heart rate and rhythm were normal [] : no respiratory distress [Bowel Sounds] : normal bowel sounds [Abnormal Walk] : normal gait [Oriented To Time, Place, And Person] : oriented to person, place, and time [Nail Clubbing] : no clubbing of the fingernails [Skin Color & Pigmentation] : normal skin color and pigmentation

## 2020-07-17 ENCOUNTER — APPOINTMENT (OUTPATIENT)
Dept: PULMONOLOGY | Facility: CLINIC | Age: 75
End: 2020-07-17
Payer: MEDICARE

## 2020-07-17 VITALS
OXYGEN SATURATION: 95 % | HEIGHT: 70 IN | BODY MASS INDEX: 32.64 KG/M2 | WEIGHT: 228 LBS | HEART RATE: 96 BPM | DIASTOLIC BLOOD PRESSURE: 90 MMHG | SYSTOLIC BLOOD PRESSURE: 158 MMHG | RESPIRATION RATE: 16 BRPM

## 2020-07-17 VITALS — RESPIRATION RATE: 18 BRPM | OXYGEN SATURATION: 86 % | HEART RATE: 105 BPM

## 2020-07-17 PROCEDURE — 99214 OFFICE O/P EST MOD 30 MIN: CPT

## 2020-07-21 ENCOUNTER — APPOINTMENT (OUTPATIENT)
Dept: CT IMAGING | Facility: CLINIC | Age: 75
End: 2020-07-21
Payer: MEDICARE

## 2020-07-21 ENCOUNTER — OUTPATIENT (OUTPATIENT)
Dept: OUTPATIENT SERVICES | Facility: HOSPITAL | Age: 75
LOS: 1 days | End: 2020-07-21
Payer: MEDICARE

## 2020-07-21 DIAGNOSIS — Z98.89 OTHER SPECIFIED POSTPROCEDURAL STATES: Chronic | ICD-10-CM

## 2020-07-21 DIAGNOSIS — J84.112 IDIOPATHIC PULMONARY FIBROSIS: ICD-10-CM

## 2020-07-21 DIAGNOSIS — Z00.00 ENCOUNTER FOR GENERAL ADULT MEDICAL EXAMINATION WITHOUT ABNORMAL FINDINGS: ICD-10-CM

## 2020-07-21 DIAGNOSIS — Z87.81 PERSONAL HISTORY OF (HEALED) TRAUMATIC FRACTURE: Chronic | ICD-10-CM

## 2020-07-21 PROCEDURE — 71250 CT THORAX DX C-: CPT | Mod: 26

## 2020-07-21 PROCEDURE — 71250 CT THORAX DX C-: CPT

## 2020-07-31 ENCOUNTER — APPOINTMENT (OUTPATIENT)
Dept: CARDIOLOGY | Facility: CLINIC | Age: 75
End: 2020-07-31

## 2020-08-17 ENCOUNTER — OUTPATIENT (OUTPATIENT)
Dept: OUTPATIENT SERVICES | Facility: HOSPITAL | Age: 75
LOS: 1 days | End: 2020-08-17
Payer: MEDICARE

## 2020-08-17 ENCOUNTER — APPOINTMENT (OUTPATIENT)
Dept: RADIOLOGY | Facility: CLINIC | Age: 75
End: 2020-08-17
Payer: MEDICARE

## 2020-08-17 DIAGNOSIS — Z00.00 ENCOUNTER FOR GENERAL ADULT MEDICAL EXAMINATION WITHOUT ABNORMAL FINDINGS: ICD-10-CM

## 2020-08-17 DIAGNOSIS — Z87.81 PERSONAL HISTORY OF (HEALED) TRAUMATIC FRACTURE: Chronic | ICD-10-CM

## 2020-08-17 DIAGNOSIS — Z98.89 OTHER SPECIFIED POSTPROCEDURAL STATES: Chronic | ICD-10-CM

## 2020-08-17 PROCEDURE — 72100 X-RAY EXAM L-S SPINE 2/3 VWS: CPT | Mod: 26

## 2020-08-17 PROCEDURE — 72100 X-RAY EXAM L-S SPINE 2/3 VWS: CPT

## 2020-10-26 NOTE — PATIENT PROFILE ADULT. - TEACHING/LEARNING DEVELOPMENTAL CONSIDERATIONS
10/26/2020     Genet Okeefe (:  1982) is a 41 y.o. female, here for evaluation of the following medical concerns:    HPI  Treatment Adherence:   Medication compliance:  compliant all of the time  Diet compliance:  compliant all of the time  Weight trend: stable  Current exercise: no regular exercise  Barriers: none    Diabetes Mellitus Type 2: Current symptoms/problems include none. Home blood sugar records: fasting range: low 100s  Any episodes of hypoglycemia? no  Eye exam current (within one year): has appointment in November  Tobacco history: She  reports that she has never smoked. She has never used smokeless tobacco.   Daily Aspirin? Yes    Hypertension:  Home blood pressure monitoring: No.  She is adherent to a low sodium diet. Patient denies chest pain, shortness of breath, lightheadedness, blurred vision, peripheral edema, palpitations, dry cough and fatigue. Antihypertensive medication side effects: no medication side effects noted. Use of agents associated with hypertension: none. Hyperlipidemia:  No new myalgias or GI upset on atorvastatin (Lipitor). Lab Results   Component Value Date    LABA1C 6.9 2020    LABA1C 6.5 10/16/2019    LABA1C 6.3 2019     Lab Results   Component Value Date    LABMICR 81 (H) 10/15/2018    CREATININE 0.76 10/21/2019     Lab Results   Component Value Date    ALT 16 10/21/2019    AST 18 10/21/2019     Lab Results   Component Value Date    CHOL 264 (H) 10/15/2018    TRIG 401 (H) 10/15/2018    HDL 39 (L) 10/15/2018    LDLDIRECT 185 (H) 10/15/2018          Review of Systems   Constitutional: Negative for fatigue and fever. HENT: Positive for congestion and sinus pressure. Eyes: Negative for visual disturbance. Respiratory: Negative for cough and shortness of breath. Cardiovascular: Negative for chest pain. Gastrointestinal: Negative. Musculoskeletal: Negative. Skin: Negative for rash. Psychiatric/Behavioral: Negative. Prior to Visit Medications    Medication Sig Taking? Authorizing Provider   VICTOZA 18 MG/3ML SOPN SC injection INJECT 0.6 MG SUBCUTANEOUSLY ONCE DAILY FOR 1 WEEK, THEN INCREASE TO 1.2MG DAILY FOR 1 WEEK, THEN INCREASE TO 1.8MG THEREAFTER  VINICIO More - KINGS   lisinopril (PRINIVIL;ZESTRIL) 10 MG tablet TAKE 1 TABLET ONCE A DAY  Sofie Estrin, APRVIRA - KINGS   citalopram (CELEXA) 40 MG tablet take 1 tablet by mouth once daily  VINICIO More - CNP   metoprolol succinate (TOPROL XL) 50 MG extended release tablet take 1 tablet by mouth once daily  Sofie Estrin APRVIRA - CNP   fluticasone (FLONASE) 50 MCG/ACT nasal spray 2 sprays by Each Nostril route daily  Patient not taking: Reported on 9/24/2020  Sofie Estrin APRN - CNP   cetirizine (ZYRTEC) 10 MG tablet Take 1 tablet by mouth daily  Patient not taking: Reported on 9/24/2020  Sofie EstrVINICIO mathew - CNP   LARISSIA 0.1-20 MG-MCG per tablet take 1 tablet by mouth once daily  Patient not taking: Reported on 5/22/2020  Asia Terrell MD   Insulin Pen Needle (PEN NEEDLES) 32G X 4 MM MISC Use as directed.   VINICIO More - CNP   metFORMIN (GLUCOPHAGE-XR) 500 MG extended release tablet take 2 tablets by mouth once daily WITH BREAKFAST  Sofie Munoz APRN - CNP   amitriptyline (ELAVIL) 25 MG tablet take 2 tablets by mouth at bedtime  VINICIO More - KINGS   dicyclomine (BENTYL) 10 MG capsule Take 1 capsule by mouth every 6 hours as needed (cramps)  Patient not taking: Reported on 12/27/2019  Antoinette Cook MD   ondansetron (ZOFRAN ODT) 4 MG disintegrating tablet Take 1 tablet by mouth every 8 hours as needed for Nausea  Patient not taking: Reported on 10/26/2020  Antoinette Cook MD   atorvastatin (LIPITOR) 20 MG tablet Take 1 tablet by mouth daily  Sofie Estrin, APRN - CNP   EPINEPHrine (EPIPEN 2-KENNY) 0.3 MG/0.3ML SOAJ injection Use as directed for allergic reaction  Patient not taking: Reported on 10/26/2020  Sofie Munoz, APRN - CNP   montelukast (SINGULAIR) 10 MG tablet Take 1 tablet by mouth nightly  Patient not taking: Reported on 3/11/2020  VINICIO Baez NP   ondansetron [facility]) 4 MG tablet Take 1 tablet by mouth 3 times daily as needed for Nausea or Vomiting  Patient not taking: Reported on 3/11/2020  VINICIO Baez NP   mometasone-formoterol [facility]) 100-5 MCG/ACT inhaler Inhale 2 puffs into the lungs 2 times daily  Patient not taking: Reported on 5/22/2020  VINICIO Fair CNP   aspirin 81 MG tablet Take 81 mg by mouth daily  Historical Provider, MD   ipratropium-albuterol (DUONEB) 0.5-2.5 (3) MG/3ML SOLN nebulizer solution Inhale 3 mLs into the lungs every 4 hours  Patient not taking: Reported on 3/11/2020  Marivel Elder DO   albuterol (PROVENTIL) (2.5 MG/3ML) 0.083% nebulizer solution Take 3 mLs by nebulization every 6 hours as needed for Wheezing  Patient not taking: Reported on 3/11/2020  VINICIO Fair CNP   albuterol sulfate HFA (PROAIR HFA) 108 (90 Base) MCG/ACT inhaler Inhale 2 puffs into the lungs every 6 hours as needed for Wheezing or Shortness of Breath  Patient not taking: Reported on 3/11/2020  VINICIO Fair CNP   Respiratory Therapy Supplies (ADULT AEROSOL MASK) MISC Mask and tubing  Patient not taking: Reported on 10/26/2020  VINICIO Fair CNP   Nebulizers (COMPRESSOR/NEBULIZER) MISC Use as directed. Patient not taking: Reported on 4/15/2020  VINICIO Fair CNP        Social History     Tobacco Use    Smoking status: Never Smoker    Smokeless tobacco: Never Used   Substance Use Topics    Alcohol use: No     Alcohol/week: 0.0 standard drinks        There were no vitals filed for this visit. Estimated body mass index is 56.01 kg/m² as calculated from the following:    Height as of 9/24/20: 6' (1.829 m). Weight as of 9/24/20: 413 lb (187.3 kg). Physical Exam  Vitals signs and nursing note reviewed.    Constitutional:       Appearance: Normal appearance. She is obese. HENT:      Head: Normocephalic and atraumatic. Eyes:      Conjunctiva/sclera: Conjunctivae normal.   Pulmonary:      Effort: Pulmonary effort is normal. No respiratory distress. Skin:     General: Skin is warm and dry. Findings: No rash. Neurological:      General: No focal deficit present. Mental Status: She is alert and oriented to person, place, and time. Psychiatric:         Mood and Affect: Mood normal.         Behavior: Behavior normal.         Thought Content: Thought content normal.         Judgment: Judgment normal.         ASSESSMENT/PLAN:  1. Type 2 diabetes mellitus with other specified complication, without long-term current use of insulin (HCC)  - Lipid Panel; Future  - Microalbumin, Ur; Future  - Comprehensive Metabolic Panel; Future  - CBC With Auto Differential; Future  - Hemoglobin A1C; Future    2. Essential hypertension  - Lipid Panel; Future  - Microalbumin, Ur; Future  - Comprehensive Metabolic Panel; Future  - CBC With Auto Differential; Future  - Hemoglobin A1C; Future    Return in about 6 months (around 4/26/2021), or if symptoms worsen or fail to improve. No orders of the defined types were placed in this encounter. Orders Placed This Encounter   Procedures    Lipid Panel     Standing Status:   Future     Standing Expiration Date:   10/26/2021     Order Specific Question:   Is Patient Fasting?/# of Hours     Answer:   12 hours    Microalbumin, Ur     Standing Status:   Future     Standing Expiration Date:   10/26/2021    Comprehensive Metabolic Panel     Standing Status:   Future     Standing Expiration Date:   10/26/2021    CBC With Auto Differential     Standing Status:   Future     Standing Expiration Date:   10/26/2021    Hemoglobin A1C     Standing Status:   Future     Standing Expiration Date:   10/26/2021       Patient given educational materials - see patient instructions.   Discussed use, benefit, and side effects of prescribed medications. All patient questions answered. Pt voiced understanding. Reviewed health maintenance. Instructed to continue current medications, diet and exercise. Mavis Xavier is a 41 y.o. female being evaluated by a Virtual Visit (video visit) encounter to address concerns as mentioned above. A caregiver was present when appropriate. Due to this being a TeleHealth encounter (During TYL-00 public health emergency), evaluation of the following organ systems was limited: Vitals/Constitutional/EENT/Resp/CV/GI//MS/Neuro/Skin/Heme-Lymph-Imm. Pursuant to the emergency declaration under the [address] [place] [address], [address] [place] [address] authority and the [employer] Act, this Virtual Visit was conducted with patient's (and/or legal guardian's) consent, to reduce the patient's risk of exposure to COVID-19 and provide necessary medical care. The patient (and/or legal guardian) has also been advised to contact this office for worsening conditions or problems, and seek emergency medical treatment and/or call 911 if deemed necessary. Patient identification was verified at the start of the visit: Yes    Total time spent for this encounter: 15 minutes    Services were provided through a video synchronous discussion virtually to substitute for in-person clinic visit. Patient and provider were located at their individual homes. --VINICIO Jha CNP on 10/26/2020 at 3:35 PM    An electronic signature was used to authenticate this note.       Electronically signed by VINICIO Jha CNP on 10/26/2020 at 3:35 PM none

## 2020-10-27 ENCOUNTER — TRANSCRIPTION ENCOUNTER (OUTPATIENT)
Age: 75
End: 2020-10-27

## 2020-12-16 ENCOUNTER — TRANSCRIPTION ENCOUNTER (OUTPATIENT)
Age: 75
End: 2020-12-16

## 2020-12-16 PROBLEM — Z01.810 ENCOUNTER FOR PRE-OPERATIVE CARDIOVASCULAR CLEARANCE: Status: RESOLVED | Noted: 2018-01-25 | Resolved: 2020-12-16

## 2020-12-23 NOTE — DISCHARGE NOTE ADULT - NSFTFHOMEOTHERFT_GEN_ALL_CORE
Patient states that she has had loose bowels x 1 week. Denies any abdominal pain or fever. infection

## 2021-05-11 ENCOUNTER — APPOINTMENT (OUTPATIENT)
Dept: PULMONOLOGY | Facility: CLINIC | Age: 76
End: 2021-05-11
Payer: MEDICARE

## 2021-05-11 VITALS — DIASTOLIC BLOOD PRESSURE: 82 MMHG | SYSTOLIC BLOOD PRESSURE: 140 MMHG | HEART RATE: 100 BPM | OXYGEN SATURATION: 96 %

## 2021-05-11 VITALS — BODY MASS INDEX: 28.7 KG/M2 | WEIGHT: 200 LBS

## 2021-05-11 DIAGNOSIS — Z77.22 CONTACT WITH AND (SUSPECTED) EXPOSURE TO ENVIRONMENTAL TOBACCO SMOKE (ACUTE) (CHRONIC): ICD-10-CM

## 2021-05-11 DIAGNOSIS — J45.909 UNSPECIFIED ASTHMA, UNCOMPLICATED: ICD-10-CM

## 2021-05-11 PROCEDURE — 99214 OFFICE O/P EST MOD 30 MIN: CPT

## 2021-05-11 RX ORDER — TAMSULOSIN HYDROCHLORIDE 0.4 MG/1
0.4 CAPSULE ORAL DAILY
Qty: 180 | Refills: 3 | Status: ACTIVE | COMMUNITY
Start: 2017-08-10

## 2021-05-11 RX ORDER — CHOLECALCIFEROL (VITAMIN D3) 25 MCG
TABLET ORAL
Refills: 0 | Status: ACTIVE | COMMUNITY

## 2021-05-11 RX ORDER — CARBOXYMETHYLCELLULOSE SODIUM 0.5 G/100ML
0.5 LIQUID OPHTHALMIC
Refills: 0 | Status: ACTIVE | COMMUNITY

## 2021-05-14 ENCOUNTER — NON-APPOINTMENT (OUTPATIENT)
Age: 76
End: 2021-05-14

## 2021-05-27 ENCOUNTER — NON-APPOINTMENT (OUTPATIENT)
Age: 76
End: 2021-05-27

## 2021-06-16 ENCOUNTER — NON-APPOINTMENT (OUTPATIENT)
Age: 76
End: 2021-06-16

## 2021-06-17 ENCOUNTER — APPOINTMENT (OUTPATIENT)
Dept: CARDIOLOGY | Facility: CLINIC | Age: 76
End: 2021-06-17

## 2021-06-17 ENCOUNTER — NON-APPOINTMENT (OUTPATIENT)
Age: 76
End: 2021-06-17

## 2021-06-19 ENCOUNTER — EMERGENCY (EMERGENCY)
Facility: HOSPITAL | Age: 76
LOS: 1 days | Discharge: HOME | End: 2021-06-19
Attending: EMERGENCY MEDICINE
Payer: MEDICARE

## 2021-06-19 VITALS
OXYGEN SATURATION: 96 % | HEART RATE: 101 BPM | DIASTOLIC BLOOD PRESSURE: 83 MMHG | HEIGHT: 71 IN | RESPIRATION RATE: 18 BRPM | TEMPERATURE: 98 F | WEIGHT: 205.03 LBS | SYSTOLIC BLOOD PRESSURE: 133 MMHG

## 2021-06-19 VITALS
OXYGEN SATURATION: 99 % | HEIGHT: 71 IN | HEART RATE: 107 BPM | RESPIRATION RATE: 20 BRPM | DIASTOLIC BLOOD PRESSURE: 68 MMHG | SYSTOLIC BLOOD PRESSURE: 122 MMHG | TEMPERATURE: 99 F

## 2021-06-19 DIAGNOSIS — Z87.81 PERSONAL HISTORY OF (HEALED) TRAUMATIC FRACTURE: Chronic | ICD-10-CM

## 2021-06-19 DIAGNOSIS — Z98.89 OTHER SPECIFIED POSTPROCEDURAL STATES: Chronic | ICD-10-CM

## 2021-06-19 PROCEDURE — 99282 EMERGENCY DEPT VISIT SF MDM: CPT

## 2021-06-19 PROCEDURE — 99283 EMERGENCY DEPT VISIT LOW MDM: CPT

## 2021-06-19 RX ORDER — CIPROFLOXACIN LACTATE 400MG/40ML
1 VIAL (ML) INTRAVENOUS
Qty: 14 | Refills: 0
Start: 2021-06-19 | End: 2021-06-25

## 2021-06-19 NOTE — ED ADULT NURSE NOTE - NSIMPLEMENTINTERV_GEN_ALL_ED
Implemented All Universal Safety Interventions:  [place] to call system. Call bell, personal items and telephone within reach. Instruct patient to call for assistance. Room bathroom lighting operational. Non-slip footwear when patient is off stretcher. Physically safe environment: no spills, clutter or unnecessary equipment. Stretcher in lowest position, wheels locked, appropriate side rails in place.

## 2021-06-19 NOTE — ED ADULT NURSE NOTE - OBJECTIVE STATEMENT
74yo male states noticed blood in reilly this AM, called dr and was told to drink water and see if that helped. pt states it helped and urine cleared. then reports 3 hours of no output and was told to come to ED by his dr. pt states once he got here reilly started to drain urine again. bag was full. pt denies any fever, chills, n/v/d, abd pain, pain around reilly. pt states had reilly changed last week. hx pulmonary fibrosis, on o2 2l nc per baseline

## 2021-06-19 NOTE — ED PROVIDER NOTE - PATIENT PORTAL LINK FT
You can access the FollowMyHealth Patient Portal offered by [facility] by registering at the following website: [email or web address]. By joining [facility]’s FollowMyHealth portal, you will also be able to view your health information using other applications (apps) compatible with our system.

## 2021-06-19 NOTE — ED ADULT NURSE NOTE - OBJECTIVE STATEMENT
74yo male arrives back to ED for blood in urine in reilly bag. pt states went home and began to have blood in tubing again. denies other sx's. bladder scan >266cc. irrigated without success. dr stewart @ bedside to change reilly

## 2021-06-19 NOTE — ED ADULT TRIAGE NOTE - CHIEF COMPLAINT QUOTE
pt a+ox3, reports just being d/c'd from ED, told to come back if reilly bag started draining blood again. pt states as soon as he got home bag filled up with blood. offers no complaints.

## 2021-06-19 NOTE — ED PROVIDER NOTE - PATIENT PORTAL LINK FT
You can access the FollowMyHealth Patient Portal offered by [facility] by registering at the following website: [email or web address]. By joining [facility]’s FollowMyHealth portal, you will also be able to view your health information using other applications (apps) compatible with our system.

## 2021-06-19 NOTE — ED PROVIDER NOTE - OBJECTIVE STATEMENT
76 y/o male comes second time today   the reilly has blood in it    had not been able to pass urine into reilly ,  finally spontaneously passed urine with now blood n it

## 2021-06-19 NOTE — ED ADULT TRIAGE NOTE - CHIEF COMPLAINT QUOTE
Pt BIBA, c/o reilly catheter being clogged and his bladder is full, c/o discomfort, catheter last changed 1 week ago, has had catheter in place for 11 months, on baby ASA daily

## 2021-06-20 ENCOUNTER — INPATIENT (INPATIENT)
Facility: HOSPITAL | Age: 76
LOS: 2 days | Discharge: HOME | DRG: 699 | End: 2021-06-23
Attending: STUDENT IN AN ORGANIZED HEALTH CARE EDUCATION/TRAINING PROGRAM | Admitting: STUDENT IN AN ORGANIZED HEALTH CARE EDUCATION/TRAINING PROGRAM
Payer: MEDICARE

## 2021-06-20 VITALS
TEMPERATURE: 98 F | WEIGHT: 205.03 LBS | HEIGHT: 71 IN | RESPIRATION RATE: 16 BRPM | HEART RATE: 97 BPM | OXYGEN SATURATION: 91 % | SYSTOLIC BLOOD PRESSURE: 148 MMHG | DIASTOLIC BLOOD PRESSURE: 75 MMHG

## 2021-06-20 DIAGNOSIS — Z87.81 PERSONAL HISTORY OF (HEALED) TRAUMATIC FRACTURE: Chronic | ICD-10-CM

## 2021-06-20 DIAGNOSIS — R31.9 HEMATURIA, UNSPECIFIED: ICD-10-CM

## 2021-06-20 DIAGNOSIS — Z98.89 OTHER SPECIFIED POSTPROCEDURAL STATES: Chronic | ICD-10-CM

## 2021-06-20 LAB
ALBUMIN SERPL ELPH-MCNC: 3.4 G/DL — SIGNIFICANT CHANGE UP (ref 3.3–5.2)
ALP SERPL-CCNC: 79 U/L — SIGNIFICANT CHANGE UP (ref 40–120)
ALT FLD-CCNC: 13 U/L — SIGNIFICANT CHANGE UP
ANION GAP SERPL CALC-SCNC: 6 MMOL/L — SIGNIFICANT CHANGE UP (ref 5–17)
APPEARANCE UR: SIGNIFICANT CHANGE UP
AST SERPL-CCNC: 28 U/L — SIGNIFICANT CHANGE UP
BACTERIA # UR AUTO: ABNORMAL
BASOPHILS # BLD AUTO: 0.05 K/UL — SIGNIFICANT CHANGE UP (ref 0–0.2)
BASOPHILS NFR BLD AUTO: 0.5 % — SIGNIFICANT CHANGE UP (ref 0–2)
BILIRUB SERPL-MCNC: 0.7 MG/DL — SIGNIFICANT CHANGE UP (ref 0.4–2)
BILIRUB UR-MCNC: NEGATIVE — SIGNIFICANT CHANGE UP
BUN SERPL-MCNC: 30.3 MG/DL — HIGH (ref 8–20)
CALCIUM SERPL-MCNC: 8.7 MG/DL — SIGNIFICANT CHANGE UP (ref 8.6–10.2)
CHLORIDE SERPL-SCNC: 101 MMOL/L — SIGNIFICANT CHANGE UP (ref 98–107)
CO2 SERPL-SCNC: 25 MMOL/L — SIGNIFICANT CHANGE UP (ref 22–29)
COLOR SPEC: ABNORMAL
CREAT SERPL-MCNC: 1.05 MG/DL — SIGNIFICANT CHANGE UP (ref 0.5–1.3)
DIFF PNL FLD: ABNORMAL
EOSINOPHIL # BLD AUTO: 0.34 K/UL — SIGNIFICANT CHANGE UP (ref 0–0.5)
EOSINOPHIL NFR BLD AUTO: 3.3 % — SIGNIFICANT CHANGE UP (ref 0–6)
GLUCOSE SERPL-MCNC: 143 MG/DL — HIGH (ref 70–99)
GLUCOSE UR QL: NEGATIVE — SIGNIFICANT CHANGE UP
HCT VFR BLD CALC: 35.2 % — LOW (ref 39–50)
HCT VFR BLD CALC: 35.7 % — LOW (ref 39–50)
HGB BLD-MCNC: 12 G/DL — LOW (ref 13–17)
HGB BLD-MCNC: 12.1 G/DL — LOW (ref 13–17)
IMM GRANULOCYTES NFR BLD AUTO: 0.4 % — SIGNIFICANT CHANGE UP (ref 0–1.5)
KETONES UR-MCNC: ABNORMAL
LEUKOCYTE ESTERASE UR-ACNC: NEGATIVE — SIGNIFICANT CHANGE UP
LYMPHOCYTES # BLD AUTO: 1.09 K/UL — SIGNIFICANT CHANGE UP (ref 1–3.3)
LYMPHOCYTES # BLD AUTO: 10.6 % — LOW (ref 13–44)
MCHC RBC-ENTMCNC: 32.4 PG — SIGNIFICANT CHANGE UP (ref 27–34)
MCHC RBC-ENTMCNC: 32.5 PG — SIGNIFICANT CHANGE UP (ref 27–34)
MCHC RBC-ENTMCNC: 33.9 GM/DL — SIGNIFICANT CHANGE UP (ref 32–36)
MCHC RBC-ENTMCNC: 34.1 GM/DL — SIGNIFICANT CHANGE UP (ref 32–36)
MCV RBC AUTO: 95.4 FL — SIGNIFICANT CHANGE UP (ref 80–100)
MCV RBC AUTO: 95.5 FL — SIGNIFICANT CHANGE UP (ref 80–100)
MONOCYTES # BLD AUTO: 0.87 K/UL — SIGNIFICANT CHANGE UP (ref 0–0.9)
MONOCYTES NFR BLD AUTO: 8.5 % — SIGNIFICANT CHANGE UP (ref 2–14)
NEUTROPHILS # BLD AUTO: 7.87 K/UL — HIGH (ref 1.8–7.4)
NEUTROPHILS NFR BLD AUTO: 76.7 % — SIGNIFICANT CHANGE UP (ref 43–77)
NITRITE UR-MCNC: NEGATIVE — SIGNIFICANT CHANGE UP
PH UR: 7 — SIGNIFICANT CHANGE UP (ref 5–8)
PLATELET # BLD AUTO: 167 K/UL — SIGNIFICANT CHANGE UP (ref 150–400)
PLATELET # BLD AUTO: 167 K/UL — SIGNIFICANT CHANGE UP (ref 150–400)
POTASSIUM SERPL-MCNC: 4.7 MMOL/L — SIGNIFICANT CHANGE UP (ref 3.5–5.3)
POTASSIUM SERPL-SCNC: 4.7 MMOL/L — SIGNIFICANT CHANGE UP (ref 3.5–5.3)
PROT SERPL-MCNC: 8.5 G/DL — SIGNIFICANT CHANGE UP (ref 6.6–8.7)
PROT UR-MCNC: 500 MG/DL
RBC # BLD: 3.69 M/UL — LOW (ref 4.2–5.8)
RBC # BLD: 3.74 M/UL — LOW (ref 4.2–5.8)
RBC # FLD: 13.2 % — SIGNIFICANT CHANGE UP (ref 10.3–14.5)
RBC # FLD: 13.2 % — SIGNIFICANT CHANGE UP (ref 10.3–14.5)
RBC CASTS # UR COMP ASSIST: >50 /HPF (ref 0–4)
SARS-COV-2 RNA SPEC QL NAA+PROBE: SIGNIFICANT CHANGE UP
SODIUM SERPL-SCNC: 132 MMOL/L — LOW (ref 135–145)
SP GR SPEC: 1 — LOW (ref 1.01–1.02)
UROBILINOGEN FLD QL: NEGATIVE — SIGNIFICANT CHANGE UP
WBC # BLD: 10.26 K/UL — SIGNIFICANT CHANGE UP (ref 3.8–10.5)
WBC # BLD: 8.79 K/UL — SIGNIFICANT CHANGE UP (ref 3.8–10.5)
WBC # FLD AUTO: 10.26 K/UL — SIGNIFICANT CHANGE UP (ref 3.8–10.5)
WBC # FLD AUTO: 8.79 K/UL — SIGNIFICANT CHANGE UP (ref 3.8–10.5)
WBC UR QL: SIGNIFICANT CHANGE UP

## 2021-06-20 PROCEDURE — 99285 EMERGENCY DEPT VISIT HI MDM: CPT

## 2021-06-20 PROCEDURE — 99223 1ST HOSP IP/OBS HIGH 75: CPT | Mod: AI

## 2021-06-20 RX ORDER — AMLODIPINE BESYLATE 2.5 MG/1
0 TABLET ORAL
Qty: 0 | Refills: 0 | DISCHARGE

## 2021-06-20 RX ORDER — TAMSULOSIN HYDROCHLORIDE 0.4 MG/1
0.8 CAPSULE ORAL AT BEDTIME
Refills: 0 | Status: DISCONTINUED | OUTPATIENT
Start: 2021-06-20 | End: 2021-06-23

## 2021-06-20 RX ORDER — AMLODIPINE BESYLATE 2.5 MG/1
5 TABLET ORAL DAILY
Refills: 0 | Status: DISCONTINUED | OUTPATIENT
Start: 2021-06-20 | End: 2021-06-23

## 2021-06-20 RX ORDER — LISINOPRIL 2.5 MG/1
1 TABLET ORAL
Qty: 0 | Refills: 0 | DISCHARGE

## 2021-06-20 RX ORDER — LISINOPRIL 2.5 MG/1
20 TABLET ORAL DAILY
Refills: 0 | Status: DISCONTINUED | OUTPATIENT
Start: 2021-06-20 | End: 2021-06-23

## 2021-06-20 RX ORDER — BUDESONIDE AND FORMOTEROL FUMARATE DIHYDRATE 160; 4.5 UG/1; UG/1
2 AEROSOL RESPIRATORY (INHALATION)
Refills: 0 | Status: DISCONTINUED | OUTPATIENT
Start: 2021-06-20 | End: 2021-06-23

## 2021-06-20 RX ADMIN — TAMSULOSIN HYDROCHLORIDE 0.8 MILLIGRAM(S): 0.4 CAPSULE ORAL at 22:10

## 2021-06-20 NOTE — H&P ADULT - NSHPPHYSICALEXAM_GEN_ALL_CORE
Vital Signs Last 24 Hrs  T(F): 97.6 (20 Jun 2021 16:11), Max: 97.9 (20 Jun 2021 09:10)  HR: 83 (20 Jun 2021 16:11) (83 - 97)  BP: 135/66 (20 Jun 2021 16:11) (135/66 - 148/75)  RR: 18 (20 Jun 2021 16:11) (16 - 18)  SpO2: 95% (20 Jun 2021 16:11) (91% - 95%)  Physical Exam:  Constitutional: Appears stated aged, not- chronically ill-appearing, laying comfortably in bed in NAD  HEENT: NC/AT, PERRL, EOMI, trachea midline, no JVD  Respiratory: CTA bilaterally, symmetrical chest rise  Cardiovascular: RRR, no m/g/r  Gastrointestinal: Soft, NT/ND, BS+  Vascular: 2+ peripheral pulses  Neurological: A/O x 3, no focal neurological deficits  Psych: Fair mood/affect  : Ortiz in place, Ortiz bag full of bright red blood.   Musculoskeletal: No edema, cyanosis, deformities. ROM normal  Skin: No obvious rash, lesions. No jaundice.

## 2021-06-20 NOTE — H&P ADULT - NSICDXPASTSURGICALHX_GEN_ALL_CORE_FT
PAST SURGICAL HISTORY:  H/O reduction of nasal fracture deviated septum    History of incisional hernia repair     S/P appendectomy

## 2021-06-20 NOTE — ED STATDOCS - OBJECTIVE STATEMENT
75y male with a PMHx of HTN, asthma, anxiety, BPH, dyspnea, bronchiectasis s/p appendectomy, incisional hernia repair, reduction of nasal fracture presents to the ED c/o urinary catheter complications. Pt reports he had the bag replaced yesterday, and since has had blood and clots in the urine, and decreased urine output. Pt reports he has had a urinary catheter for 11 months. Pt reports he takes aspirin. 75y male with a PMHx of HTN, asthma, anxiety, BPH, dyspnea, bronchiectasis s/p appendectomy, incisional hernia repair, reduction of nasal fracture presents to the ED c/o urinary catheter complications. Pt reports he had the reilly replaced yesterday, and since has had blood and clots in the urine, and decreased urine output. +suprapubic pain. No fevers, chills, sweats, vomiting, back pain.  Pt reports he has had a urinary catheter for 11 months. Pt reports he takes aspirin.

## 2021-06-20 NOTE — ED STATDOCS - CLINICAL SUMMARY MEDICAL DECISION MAKING FREE TEXT BOX
Pt presenting with hematuria and urinary obstruction. Will check labs, replace Ortiz, bladder irrigation and reassess.

## 2021-06-20 NOTE — H&P ADULT - HISTORY OF PRESENT ILLNESS
74 yo M w/ PMH of Pulmonary Fibrosis on home O2, HTN, Asthma, Anxiety, BPH with chronic indwelling Ortiz presenting with chief complaint of hematuria. Patient states he had his Ortiz changed on Wednesday. He normally follows with Dr. Ng and was scheduled to follow up tomorrow. He originally presented to the ED yesterday with complaints of blood clots, hematuria. He states his urine was purple. He states his tube was "clogged" and urine output decreased. He was discharged from the ED after it cleared up and then returned that night. Ortiz was replaced and patient was then discharged. His symptoms recurred therefore he presented to the ED again today.    Patient denies any fever, chills, chest pain, palpitations, orthopnea/PND, nausea, vomiting, diarrhea, constipation, cough, wheezing, SOB.

## 2021-06-20 NOTE — H&P ADULT - ASSESSMENT
74 yo M w/ PMH of Pulmonary Fibrosis on home O2, HTN, Asthma, Anxiety, BPH with chronic indwelling Ortiz presenting with chief complaint of hematuria.     #Malfunction of chronic indwelling Ortiz POA w/ hematuria, clotting  - Ortiz exchanged 6/19 in ED  - Currently on CBI started by ED  - Ortiz currently draining abdias blood  - Urology consult  - Hold ASA until safe per Urology  - Monitor CBC  - C/w Flomax    #Pulmonary Fibrosis on home O2  - currently not in acute exacerbation  - C/w O2 prn    #HTN  - C/w Lisinopril, Norvasc    #Anxiety  - C/w Ativan    #Asthma  - Not in acute exacerbation  - C/w Symbicort    DVT ppx: SCDs in setting of hematuria

## 2021-06-20 NOTE — ED STATDOCS - PROGRESS NOTE DETAILS
CHRISS CACERES : PT evaluated by intake physician. HPI/PE/ROS as noted above. Will follow up plan per intake physician   indwelling reilly placed 11 months ago due to BPH follows with urologist Dr GARCIA [facility] of [facility].  CBI in progress with symptomatic improvement + drainage. CHRISS CACERES:   as per patient has made an apt with urologist Mary Jarvis for tmrw morning 730am   contact information [phone] emergency number [phone]  pt still with pink urine in reilly bag. states that he prefers to go home and fu in morning with urologist x2 attempts to reach advanced urology   pt with significant clots and hematuria     page to [facility] placed

## 2021-06-20 NOTE — H&P ADULT - NSICDXFAMILYHX_GEN_ALL_CORE_FT
FAMILY HISTORY:  Father  Still living? No  Family history of cerebrovascular accident (CVA), Age at diagnosis: Age Unknown

## 2021-06-20 NOTE — ED ADULT NURSE NOTE - OBJECTIVE STATEMENT
pt presents to ed a&ox3, no acute distress, breaths even and unlabored c/o complications with his urinary catheter. pt reports he was here yesterday and had reilly replaced due to blood clots in urine. pt presents today c/o same bloody urine with clots and decreased urine output for the past 2 hours PTA. has had catheter for almost a year for urinary retention.

## 2021-06-20 NOTE — ED STATDOCS - GENITOURINARY, MLM
normal... no bladder tenderness, no bilateral CVA tenderness. Chaperoned by Angel. Penis has no bleeding or tenderness. Clots noted in Ortiz bag.

## 2021-06-20 NOTE — ED ADULT TRIAGE NOTE - CHIEF COMPLAINT QUOTE
"my catheter bag is clogged. I was here 2 times for the same thing yesterday." pt also noted he wears o2 at home and has none available.

## 2021-06-20 NOTE — H&P ADULT - NSICDXPASTMEDICALHX_GEN_ALL_CORE_FT
PAST MEDICAL HISTORY:  Anxiety     Asthma     BPH (benign prostatic hyperplasia)     Bronchiectasis     Dyspnea     HTN (hypertension)

## 2021-06-21 ENCOUNTER — TRANSCRIPTION ENCOUNTER (OUTPATIENT)
Age: 76
End: 2021-06-21

## 2021-06-21 LAB
ALBUMIN SERPL ELPH-MCNC: 3.3 G/DL — SIGNIFICANT CHANGE UP (ref 3.3–5.2)
ALP SERPL-CCNC: 73 U/L — SIGNIFICANT CHANGE UP (ref 40–120)
ALT FLD-CCNC: 12 U/L — SIGNIFICANT CHANGE UP
ANION GAP SERPL CALC-SCNC: 12 MMOL/L — SIGNIFICANT CHANGE UP (ref 5–17)
APTT BLD: 33.2 SEC — SIGNIFICANT CHANGE UP (ref 27.5–35.5)
AST SERPL-CCNC: 21 U/L — SIGNIFICANT CHANGE UP
BILIRUB SERPL-MCNC: 1.1 MG/DL — SIGNIFICANT CHANGE UP (ref 0.4–2)
BUN SERPL-MCNC: 22.9 MG/DL — HIGH (ref 8–20)
CALCIUM SERPL-MCNC: 8.6 MG/DL — SIGNIFICANT CHANGE UP (ref 8.6–10.2)
CHLORIDE SERPL-SCNC: 98 MMOL/L — SIGNIFICANT CHANGE UP (ref 98–107)
CO2 SERPL-SCNC: 21 MMOL/L — LOW (ref 22–29)
COVID-19 SPIKE DOMAIN AB INTERP: POSITIVE
COVID-19 SPIKE DOMAIN ANTIBODY RESULT: >250 U/ML — HIGH
CREAT SERPL-MCNC: 1.05 MG/DL — SIGNIFICANT CHANGE UP (ref 0.5–1.3)
GLUCOSE SERPL-MCNC: 100 MG/DL — HIGH (ref 70–99)
HCT VFR BLD CALC: 36.8 % — LOW (ref 39–50)
HCV AB S/CO SERPL IA: 0.14 S/CO — SIGNIFICANT CHANGE UP (ref 0–0.99)
HCV AB SERPL-IMP: SIGNIFICANT CHANGE UP
HGB BLD-MCNC: 12.6 G/DL — LOW (ref 13–17)
INR BLD: 1.25 RATIO — HIGH (ref 0.88–1.16)
MCHC RBC-ENTMCNC: 32.4 PG — SIGNIFICANT CHANGE UP (ref 27–34)
MCHC RBC-ENTMCNC: 34.2 GM/DL — SIGNIFICANT CHANGE UP (ref 32–36)
MCV RBC AUTO: 94.6 FL — SIGNIFICANT CHANGE UP (ref 80–100)
PLATELET # BLD AUTO: 159 K/UL — SIGNIFICANT CHANGE UP (ref 150–400)
POTASSIUM SERPL-MCNC: 4.4 MMOL/L — SIGNIFICANT CHANGE UP (ref 3.5–5.3)
POTASSIUM SERPL-SCNC: 4.4 MMOL/L — SIGNIFICANT CHANGE UP (ref 3.5–5.3)
PROT SERPL-MCNC: 8.4 G/DL — SIGNIFICANT CHANGE UP (ref 6.6–8.7)
PROTHROM AB SERPL-ACNC: 14.3 SEC — HIGH (ref 10.6–13.6)
RBC # BLD: 3.89 M/UL — LOW (ref 4.2–5.8)
RBC # FLD: 13 % — SIGNIFICANT CHANGE UP (ref 10.3–14.5)
SARS-COV-2 IGG+IGM SERPL QL IA: >250 U/ML — HIGH
SARS-COV-2 IGG+IGM SERPL QL IA: POSITIVE
SODIUM SERPL-SCNC: 131 MMOL/L — LOW (ref 135–145)
WBC # BLD: 8.4 K/UL — SIGNIFICANT CHANGE UP (ref 3.8–10.5)
WBC # FLD AUTO: 8.4 K/UL — SIGNIFICANT CHANGE UP (ref 3.8–10.5)

## 2021-06-21 PROCEDURE — 99232 SBSQ HOSP IP/OBS MODERATE 35: CPT

## 2021-06-21 PROCEDURE — 76775 US EXAM ABDO BACK WALL LIM: CPT | Mod: 26

## 2021-06-21 RX ORDER — CEFTRIAXONE 500 MG/1
INJECTION, POWDER, FOR SOLUTION INTRAMUSCULAR; INTRAVENOUS
Refills: 0 | Status: DISCONTINUED | OUTPATIENT
Start: 2021-06-21 | End: 2021-06-23

## 2021-06-21 RX ORDER — CEFPODOXIME PROXETIL 100 MG
1 TABLET ORAL
Qty: 18 | Refills: 0
Start: 2021-06-21 | End: 2021-06-29

## 2021-06-21 RX ORDER — CEFTRIAXONE 500 MG/1
1000 INJECTION, POWDER, FOR SOLUTION INTRAMUSCULAR; INTRAVENOUS EVERY 24 HOURS
Refills: 0 | Status: DISCONTINUED | OUTPATIENT
Start: 2021-06-22 | End: 2021-06-23

## 2021-06-21 RX ORDER — CEFTRIAXONE 500 MG/1
1000 INJECTION, POWDER, FOR SOLUTION INTRAMUSCULAR; INTRAVENOUS ONCE
Refills: 0 | Status: COMPLETED | OUTPATIENT
Start: 2021-06-21 | End: 2021-06-21

## 2021-06-21 RX ADMIN — BUDESONIDE AND FORMOTEROL FUMARATE DIHYDRATE 2 PUFF(S): 160; 4.5 AEROSOL RESPIRATORY (INHALATION) at 09:41

## 2021-06-21 RX ADMIN — CEFTRIAXONE 100 MILLIGRAM(S): 500 INJECTION, POWDER, FOR SOLUTION INTRAMUSCULAR; INTRAVENOUS at 09:46

## 2021-06-21 RX ADMIN — LISINOPRIL 20 MILLIGRAM(S): 2.5 TABLET ORAL at 05:47

## 2021-06-21 RX ADMIN — TAMSULOSIN HYDROCHLORIDE 0.8 MILLIGRAM(S): 0.4 CAPSULE ORAL at 22:01

## 2021-06-21 RX ADMIN — Medication 0.5 MILLIGRAM(S): at 11:36

## 2021-06-21 RX ADMIN — AMLODIPINE BESYLATE 5 MILLIGRAM(S): 2.5 TABLET ORAL at 05:47

## 2021-06-21 NOTE — DISCHARGE NOTE PROVIDER - NSDCCPCAREPLAN_GEN_ALL_CORE_FT
PRINCIPAL DISCHARGE DIAGNOSIS  Diagnosis: Hematuria  Assessment and Plan of Treatment: Follow up w/ Urology. Take antibiotics as prescribed.

## 2021-06-21 NOTE — PATIENT PROFILE ADULT - CENTRAL VENOUS CATHETER/PICC LINE
Order placed for PT for pelvic floor therapy  RN sent Yurpy message to patient with contact information  Encounter open    no

## 2021-06-21 NOTE — CONSULT NOTE ADULT - ASSESSMENT
A/P    Chronic urinary retention  gross hematuria  UTI    hematuria likely due to UTI after reilly change  continue abx  Reilly flushed - no clots  hematuria has resolved  Ok for discharge with reilly when medically stable (with abx)  follow up with Dr. Jarvis as outpatient

## 2021-06-21 NOTE — DISCHARGE NOTE PROVIDER - HOSPITAL COURSE
76 yo M w/ PMH of Pulmonary Fibrosis on home O2, HTN, Asthma, Anxiety, BPH with chronic indwelling Ortiz presenting with chief complaint of hematuria. Patient had Ortiz irrigated and patients hematuria cleared up and Ortiz draining appropriately. Seen by Urology. Hematuria likely in setting of UTI after Ortiz exchange. Patient started on ABx. Patient currently asymptomatic. Per Urology, pt clear from Urology standpoint., pt to follow up w/ his Urologist outpatient and to be discharged on ABx. Pt no longer requires inpatient hospitalization and stable for discharge.     Discharge plan discussed w/ patient.    Discharge time coordinatin mins    Vital Signs Last 24 Hrs  T(F): 97.9 (2021 05:15), Max: 98.2 (2021 19:16)  HR: 82 (2021 05:15) (68 - 96)  BP: 138/75 (2021 05:15) (124/72 - 147/81)  RR: 17 (2021 05:15) (17 - 18)  SpO2: 96% (2021 05:15) (95% - 96%)    Physical Exam:  Constitutional: Appears stated aged, not- chronically ill-appearing, laying comfortably in bed in NAD  HEENT: NC/AT, PERRL, EOMI, trachea midline, no JVD  Respiratory: CTA bilaterally, symmetrical chest rise  Cardiovascular: RRR, no m/g/r  Gastrointestinal: Soft, NT/ND, BS+  Vascular: 2+ peripheral pulses  Neurological: A/O x 3, no focal neurological deficits  Psych: Fair mood/affect  Musculoskeletal: No edema, cyanosis, deformities. ROM normal  Skin: No obvious rash, lesions. No jaundice.   74 yo M w/ PMH of Pulmonary Fibrosis on home O2, HTN, Asthma, Anxiety, BPH with chronic indwelling Ortiz presenting with chief complaint of hematuria. Patient had Ortiz irrigated and patients hematuria cleared up and Ortiz draining appropriately. Seen by Urology. Hematuria likely in setting of UTI after Ortiz exchange. Patient started on ABx. Patient currently asymptomatic, no leukocytosis, no fevers. Per Urology, pt clear from Urology standpoint., pt to follow up w/ his Urologist outpatient and to be discharged on ABx. Pt no longer requires inpatient hospitalization and stable for discharge.     Discharge plan discussed w/ patient.    Discharge time coordinatin mins    Vital Signs Last 24 Hrs  T(F): 97.9 (2021 05:15), Max: 98.2 (2021 19:16)  HR: 82 (2021 05:15) (68 - 96)  BP: 138/75 (2021 05:15) (124/72 - 147/81)  RR: 17 (2021 05:15) (17 - 18)  SpO2: 96% (2021 05:15) (95% - 96%)    Physical Exam:  Constitutional: Appears stated aged, not- chronically ill-appearing, laying comfortably in bed in NAD  HEENT: NC/AT, PERRL, EOMI, trachea midline, no JVD  Respiratory: CTA bilaterally, symmetrical chest rise  Cardiovascular: RRR, no m/g/r  Gastrointestinal: Soft, NT/ND, BS+  Vascular: 2+ peripheral pulses  Neurological: A/O x 3, no focal neurological deficits  Psych: Fair mood/affect  Musculoskeletal: No edema, cyanosis, deformities. ROM normal  Skin: No obvious rash, lesions. No jaundice.   76 yo M w/ PMH of Pulmonary Fibrosis on home O2, HTN, Asthma, Anxiety, BPH with chronic indwelling Ortiz presenting with chief complaint of hematuria. Patient had Ortiz irrigated and patients hematuria cleared up and Ortiz draining appropriately. Seen by Urology. Hematuria likely in setting of UTI after Ortiz exchange. Patient started on ABx. Patient currently asymptomatic, no leukocytosis, no fevers. Per Urology, pt clear from Urology standpoint., pt to follow up w/ his Urologist outpatient and to be discharged on ABx. Pt no longer requires inpatient hospitalization and stable for discharge.     Discharge plan discussed w/ patient.    Discharge time coordinatin mins    Vital Signs Last 24 Hrs  T(F): 98 (2021 04:42), Max: 98 (2021 11:32)  HR: 78 (2021 04:42) (73 - 93)  BP: 106/64 (2021 04:42) (100/65 - 119/51)  RR: 18 (2021 04:42) (18 - 19)  SpO2: 95% (2021 04:42) (92% - 98%)    Physical Exam:  Constitutional: Appears stated aged, not- chronically ill-appearing, laying comfortably in bed in NAD  HEENT: NC/AT, PERRL, EOMI, trachea midline, no JVD  Respiratory: CTA bilaterally, symmetrical chest rise  Cardiovascular: RRR, no m/g/r  Gastrointestinal: Soft, NT/ND, BS+  Vascular: 2+ peripheral pulses  Neurological: A/O x 3, no focal neurological deficits  Psych: Fair mood/affect  Musculoskeletal: No edema, cyanosis, deformities. ROM normal  Skin: No obvious rash, lesions. No jaundice.

## 2021-06-21 NOTE — DISCHARGE NOTE PROVIDER - NSDCMRMEDTOKEN_GEN_ALL_CORE_FT
Advair Diskus:  inhaled   amLODIPine 5 mg oral tablet: 1 tab(s) orally once a day  aspirin 81 mg oral tablet, chewable: 1 tab(s) orally once a day  DME oxygen homefill and concentrator with refill system: O2 sat 88% on room air at rest  O2 at 2 L NC continuous  Dx: Pulmonary fibrosis  TSERING 99 months  docusate sodium 100 mg oral capsule: 1 cap(s) orally 3 times a day  lisinopril 20 mg oral tablet: 1 tab(s) orally once a day  LORazepam 0.5 mg oral tablet: 1 tab(s) orally 2 times a day, As needed, Anxiety  tamsulosin 0.4 mg oral capsule: 2 cap(s) orally once a day (at bedtime)   Advair Diskus:  inhaled   amLODIPine 5 mg oral tablet: 1 tab(s) orally once a day  aspirin 81 mg oral tablet, chewable: 1 tab(s) orally once a day  cefpodoxime 200 mg oral tablet: 1 tab(s) orally 2 times a day   docusate sodium 100 mg oral capsule: 1 cap(s) orally 3 times a day  lisinopril 20 mg oral tablet: 1 tab(s) orally once a day  LORazepam 0.5 mg oral tablet: 1 tab(s) orally 2 times a day, As needed, Anxiety  tamsulosin 0.4 mg oral capsule: 2 cap(s) orally once a day (at bedtime)

## 2021-06-22 DIAGNOSIS — R33.9 RETENTION OF URINE, UNSPECIFIED: ICD-10-CM

## 2021-06-22 DIAGNOSIS — N30.01 ACUTE CYSTITIS WITH HEMATURIA: ICD-10-CM

## 2021-06-22 DIAGNOSIS — R31.0 GROSS HEMATURIA: ICD-10-CM

## 2021-06-22 LAB
-  AMIKACIN: SIGNIFICANT CHANGE UP
-  AMOXICILLIN/CLAVULANIC ACID: SIGNIFICANT CHANGE UP
-  AMPICILLIN/SULBACTAM: SIGNIFICANT CHANGE UP
-  AMPICILLIN: SIGNIFICANT CHANGE UP
-  AZTREONAM: SIGNIFICANT CHANGE UP
-  CEFAZOLIN: SIGNIFICANT CHANGE UP
-  CEFEPIME: SIGNIFICANT CHANGE UP
-  CEFOXITIN: SIGNIFICANT CHANGE UP
-  CEFTRIAXONE: SIGNIFICANT CHANGE UP
-  CIPROFLOXACIN: SIGNIFICANT CHANGE UP
-  ERTAPENEM: SIGNIFICANT CHANGE UP
-  GENTAMICIN: SIGNIFICANT CHANGE UP
-  IMIPENEM: SIGNIFICANT CHANGE UP
-  LEVOFLOXACIN: SIGNIFICANT CHANGE UP
-  MEROPENEM: SIGNIFICANT CHANGE UP
-  NITROFURANTOIN: SIGNIFICANT CHANGE UP
-  PIPERACILLIN/TAZOBACTAM: SIGNIFICANT CHANGE UP
-  TIGECYCLINE: SIGNIFICANT CHANGE UP
-  TOBRAMYCIN: SIGNIFICANT CHANGE UP
-  TRIMETHOPRIM/SULFAMETHOXAZOLE: SIGNIFICANT CHANGE UP
CULTURE RESULTS: SIGNIFICANT CHANGE UP
HCT VFR BLD CALC: 36.5 % — LOW (ref 39–50)
HGB BLD-MCNC: 12.6 G/DL — LOW (ref 13–17)
MCHC RBC-ENTMCNC: 32.4 PG — SIGNIFICANT CHANGE UP (ref 27–34)
MCHC RBC-ENTMCNC: 34.5 GM/DL — SIGNIFICANT CHANGE UP (ref 32–36)
MCV RBC AUTO: 93.8 FL — SIGNIFICANT CHANGE UP (ref 80–100)
METHOD TYPE: SIGNIFICANT CHANGE UP
ORGANISM # SPEC MICROSCOPIC CNT: SIGNIFICANT CHANGE UP
ORGANISM # SPEC MICROSCOPIC CNT: SIGNIFICANT CHANGE UP
PLATELET # BLD AUTO: 184 K/UL — SIGNIFICANT CHANGE UP (ref 150–400)
RBC # BLD: 3.89 M/UL — LOW (ref 4.2–5.8)
RBC # FLD: 13.2 % — SIGNIFICANT CHANGE UP (ref 10.3–14.5)
SPECIMEN SOURCE: SIGNIFICANT CHANGE UP
WBC # BLD: 10.18 K/UL — SIGNIFICANT CHANGE UP (ref 3.8–10.5)
WBC # FLD AUTO: 10.18 K/UL — SIGNIFICANT CHANGE UP (ref 3.8–10.5)

## 2021-06-22 PROCEDURE — 99232 SBSQ HOSP IP/OBS MODERATE 35: CPT

## 2021-06-22 RX ADMIN — Medication 0.5 MILLIGRAM(S): at 16:31

## 2021-06-22 RX ADMIN — AMLODIPINE BESYLATE 5 MILLIGRAM(S): 2.5 TABLET ORAL at 05:25

## 2021-06-22 RX ADMIN — LISINOPRIL 20 MILLIGRAM(S): 2.5 TABLET ORAL at 05:25

## 2021-06-22 RX ADMIN — TAMSULOSIN HYDROCHLORIDE 0.8 MILLIGRAM(S): 0.4 CAPSULE ORAL at 21:34

## 2021-06-22 RX ADMIN — CEFTRIAXONE 100 MILLIGRAM(S): 500 INJECTION, POWDER, FOR SOLUTION INTRAMUSCULAR; INTRAVENOUS at 08:59

## 2021-06-23 ENCOUNTER — TRANSCRIPTION ENCOUNTER (OUTPATIENT)
Age: 76
End: 2021-06-23

## 2021-06-23 VITALS
SYSTOLIC BLOOD PRESSURE: 113 MMHG | HEART RATE: 80 BPM | DIASTOLIC BLOOD PRESSURE: 66 MMHG | TEMPERATURE: 98 F | RESPIRATION RATE: 18 BRPM | OXYGEN SATURATION: 96 %

## 2021-06-23 LAB
HCT VFR BLD CALC: 37.4 % — LOW (ref 39–50)
HGB BLD-MCNC: 12.8 G/DL — LOW (ref 13–17)
MCHC RBC-ENTMCNC: 32.4 PG — SIGNIFICANT CHANGE UP (ref 27–34)
MCHC RBC-ENTMCNC: 34.2 GM/DL — SIGNIFICANT CHANGE UP (ref 32–36)
MCV RBC AUTO: 94.7 FL — SIGNIFICANT CHANGE UP (ref 80–100)
PLATELET # BLD AUTO: 202 K/UL — SIGNIFICANT CHANGE UP (ref 150–400)
RBC # BLD: 3.95 M/UL — LOW (ref 4.2–5.8)
RBC # FLD: 13.1 % — SIGNIFICANT CHANGE UP (ref 10.3–14.5)
WBC # BLD: 10.88 K/UL — HIGH (ref 3.8–10.5)
WBC # FLD AUTO: 10.88 K/UL — HIGH (ref 3.8–10.5)

## 2021-06-23 PROCEDURE — 99239 HOSP IP/OBS DSCHRG MGMT >30: CPT

## 2021-06-23 PROCEDURE — 99285 EMERGENCY DEPT VISIT HI MDM: CPT | Mod: 25

## 2021-06-23 PROCEDURE — U0005: CPT

## 2021-06-23 PROCEDURE — 76775 US EXAM ABDO BACK WALL LIM: CPT

## 2021-06-23 PROCEDURE — 36415 COLL VENOUS BLD VENIPUNCTURE: CPT

## 2021-06-23 PROCEDURE — 87186 SC STD MICRODIL/AGAR DIL: CPT

## 2021-06-23 PROCEDURE — 80053 COMPREHEN METABOLIC PANEL: CPT

## 2021-06-23 PROCEDURE — 87086 URINE CULTURE/COLONY COUNT: CPT

## 2021-06-23 PROCEDURE — 86769 SARS-COV-2 COVID-19 ANTIBODY: CPT

## 2021-06-23 PROCEDURE — U0003: CPT

## 2021-06-23 PROCEDURE — 86803 HEPATITIS C AB TEST: CPT

## 2021-06-23 PROCEDURE — 85027 COMPLETE CBC AUTOMATED: CPT

## 2021-06-23 PROCEDURE — 94640 AIRWAY INHALATION TREATMENT: CPT

## 2021-06-23 PROCEDURE — 85610 PROTHROMBIN TIME: CPT

## 2021-06-23 PROCEDURE — 87077 CULTURE AEROBIC IDENTIFY: CPT

## 2021-06-23 PROCEDURE — 85730 THROMBOPLASTIN TIME PARTIAL: CPT

## 2021-06-23 PROCEDURE — 81001 URINALYSIS AUTO W/SCOPE: CPT

## 2021-06-23 PROCEDURE — 51702 INSERT TEMP BLADDER CATH: CPT

## 2021-06-23 PROCEDURE — 85025 COMPLETE CBC W/AUTO DIFF WBC: CPT

## 2021-06-23 PROCEDURE — 99283 EMERGENCY DEPT VISIT LOW MDM: CPT | Mod: 25

## 2021-06-23 RX ADMIN — LISINOPRIL 20 MILLIGRAM(S): 2.5 TABLET ORAL at 05:25

## 2021-06-23 RX ADMIN — CEFTRIAXONE 100 MILLIGRAM(S): 500 INJECTION, POWDER, FOR SOLUTION INTRAMUSCULAR; INTRAVENOUS at 09:42

## 2021-06-23 RX ADMIN — AMLODIPINE BESYLATE 5 MILLIGRAM(S): 2.5 TABLET ORAL at 05:25

## 2021-06-23 NOTE — PROGRESS NOTE ADULT - SUBJECTIVE AND OBJECTIVE BOX
[facility] Division of Hospital Medicine  Juan Carlos Palafox [phone]    Chief Complaint:  Patient is a 75y old  Male who presents with a chief complaint of Hematuria (22 Jun 2021 10:43)      SUBJECTIVE / OVERNIGHT EVENTS:  Pt seen and examined at bedside. Yesterday pt developed recurrent of hematuria w/ clots. Pt with increased bladder retention. CBI ordered per Urology. This morning pt's urine clear, wants to go home.    Patient denies chest pain, SOB, abd pain, N/V, fever, chills, dysuria or any other complaints. All remainder ROS negative.     MEDICATIONS  (STANDING):  amLODIPine   Tablet 5 milliGRAM(s) Oral daily  budesonide 160 MICROgram(s)/formoterol 4.5 MICROgram(s) Inhaler 2 Puff(s) Inhalation two times a day  cefTRIAXone   IVPB      cefTRIAXone   IVPB 1000 milliGRAM(s) IV Intermittent every 24 hours  lisinopril 20 milliGRAM(s) Oral daily  LORazepam     Tablet 0.5 milliGRAM(s) Oral daily  tamsulosin 0.8 milliGRAM(s) Oral at bedtime    MEDICATIONS  (PRN):        I&O's Summary    21 Jun 2021 07:01  -  22 Jun 2021 07:00  --------------------------------------------------------  IN: 8500 mL / OUT: 95982 mL / NET: -3700 mL    22 Jun 2021 07:01  -  22 Jun 2021 11:53  --------------------------------------------------------  IN: 2750 mL / OUT: 5100 mL / NET: -2350 mL        PHYSICAL EXAM:  Vital Signs Last 24 Hrs  T(C): 37.2 (22 Jun 2021 04:43), Max: 37.2 (22 Jun 2021 04:43)  T(F): 98.9 (22 Jun 2021 04:43), Max: 98.9 (22 Jun 2021 04:43)  HR: 79 (22 Jun 2021 04:43) (79 - 96)  BP: 129/78 (22 Jun 2021 04:43) (129/78 - 147/77)  BP(mean): --  RR: 18 (22 Jun 2021 04:43) (18 - 18)  SpO2: 95% (22 Jun 2021 04:43) (92% - 95%)        CONSTITUTIONAL: NAD  HEENT: NC/AT, PERRL, no JVD  RESPIRATORY: CTA bilaterally, normal effort  CARDIOVASCULAR: RRR, S1/S2+, no m/g/r  ABDOMEN: Nontender to palpation, normoactive bowel sounds, no rebound/guarding; No hepatosplenomegaly  MUSCULOSKELETAL: No edema, cyanosis or deformities.  PSYCH: A+O to person, place, and time; anxious.   - Ortiz draining clear urine  NEUROLOGY: CN 2-12 are intact and symmetric; no gross neurological deficits.  SKIN: No rashes; no palpable lesions  VASC: Distal pulses palpable    LABS:                        12.6   10.18 )-----------( 184      ( 22 Jun 2021 07:06 )             36.5     06-21    131<L>  |  98  |  22.9<H>  ----------------------------<  100<H>  4.4   |  21.0<L>  |  1.05    Ca    8.6      21 Jun 2021 05:46    TPro  8.4  /  Alb  3.3  /  TBili  1.1  /  DBili  x   /  AST  21  /  ALT  12  /  AlkPhos  73  06-21    PT/INR - ( 21 Jun 2021 05:46 )   PT: 14.3 sec;   INR: 1.25 ratio         PTT - ( 21 Jun 2021 05:46 )  PTT:33.2 sec          Culture - Urine (collected 20 Jun 2021 15:09)  Source: .Urine Clean Catch (Midstream)  Final Report (22 Jun 2021 10:53):    >100,000 CFU/ml Enterobacter cloacae complex  Organism: Enterobacter cloacae complex (22 Jun 2021 10:53)  Organism: Enterobacter cloacae complex (22 Jun 2021 10:53)      CAPILLARY BLOOD GLUCOSE            RADIOLOGY & ADDITIONAL TESTS:  Results Reviewed:   Imaging Personally Reviewed:  Electrocardiogram Personally Reviewed:                                          
[facility] Division of Hospital Medicine  Juan Carlos Palafox [phone]    Chief Complaint:  Patient is a 75y old  Male who presents with a chief complaint of Hematuria (2021 10:49)      SUBJECTIVE / OVERNIGHT EVENTS:  Pt seen and examined at bedside. No acute events reported overnight. Seen by Urology today. Ortiz draining well, hematuria cleared.    Patient denies chest pain, SOB, abd pain, N/V, fever, chills, dysuria or any other complaints. All remainder ROS negative.     MEDICATIONS  (STANDING):  amLODIPine   Tablet 5 milliGRAM(s) Oral daily  budesonide 160 MICROgram(s)/formoterol 4.5 MICROgram(s) Inhaler 2 Puff(s) Inhalation two times a day  cefTRIAXone   IVPB      lisinopril 20 milliGRAM(s) Oral daily  LORazepam     Tablet 0.5 milliGRAM(s) Oral daily  tamsulosin 0.8 milliGRAM(s) Oral at bedtime    MEDICATIONS  (PRN):        I&O's Summary    2021 07:01  -  2021 07:00  --------------------------------------------------------  IN: 69432 mL / OUT: 21487 mL / NET: -7825 mL    2021 07:01  -  2021 14:53  --------------------------------------------------------  IN: 0 mL / OUT: 2900 mL / NET: -2900 mL        PHYSICAL EXAM:  Vital Signs Last 24 Hrs  T(C): 36.3 (2021 10:44), Max: 36.8 (2021 19:16)  T(F): 97.3 (2021 10:44), Max: 98.2 (2021 19:16)  HR: 89 (2021 10:44) (68 - 96)  BP: 142/80 (2021 10:44) (124/72 - 147/81)  BP(mean): --  RR: 18 (2021 10:44) (17 - 18)  SpO2: 94% (2021 10:44) (94% - 96%)        CONSTITUTIONAL: NAD  HEENT: NC/AT, PERRL, no JVD  RESPIRATORY: CTA bilaterally, normal effort  CARDIOVASCULAR: RRR, S1/S2+, no m/g/r  ABDOMEN: Nontender to palpation, normoactive bowel sounds, no rebound/guarding; No hepatosplenomegaly  MUSCULOSKELETAL: No edema, cyanosis or deformities.  PSYCH: A+O to person, place, and time; extremely anxious  NEUROLOGY: CN 2-12 are intact and symmetric; no gross neurological deficits.  SKIN: No rashes; no palpable lesions  VASC: Distal pulses palpable    LABS:                        12.6   8.40  )-----------( 159      ( 2021 05:46 )             36.8     06-21    131<L>  |  98  |  22.9<H>  ----------------------------<  100<H>  4.4   |  21.0<L>  |  1.05    Ca    8.6      2021 05:46    TPro  8.4  /  Alb  3.3  /  TBili  1.1  /  DBili  x   /  AST  21  /  ALT  12  /  AlkPhos  73  06-21    PT/INR - ( 2021 05:46 )   PT: 14.3 sec;   INR: 1.25 ratio         PTT - ( 2021 05:46 )  PTT:33.2 sec      Urinalysis Basic - ( 2021 10:21 )    Color: Red / Appearance: bloody / S.005 / pH: x  Gluc: x / Ketone: Small  / Bili: Negative / Urobili: Negative   Blood: x / Protein: 500 mg/dL / Nitrite: Negative   Leuk Esterase: Negative / RBC: >50 /HPF / WBC 3-5   Sq Epi: x / Non Sq Epi: x / Bacteria: Moderate        Culture - Urine (collected 2021 15:09)  Source: .Urine Clean Catch (Midstream)  Preliminary Report (2021 14:21):    >100,000 CFU/ml Enterobacter cloacae complex      CAPILLARY BLOOD GLUCOSE            RADIOLOGY & ADDITIONAL TESTS:  Results Reviewed:   Imaging Personally Reviewed:  Electrocardiogram Personally Reviewed:                                          
INTERVAL HPI/OVERNIGHT EVENTS:    MEDICATIONS  (STANDING):  amLODIPine   Tablet 5 milliGRAM(s) Oral daily  budesonide 160 MICROgram(s)/formoterol 4.5 MICROgram(s) Inhaler 2 Puff(s) Inhalation two times a day  cefTRIAXone   IVPB      cefTRIAXone   IVPB 1000 milliGRAM(s) IV Intermittent every 24 hours  lisinopril 20 milliGRAM(s) Oral daily  LORazepam     Tablet 0.5 milliGRAM(s) Oral daily  tamsulosin 0.8 milliGRAM(s) Oral at bedtime    MEDICATIONS  (PRN):      Allergies    No Known Allergies    Intolerances        Vital Signs Last 24 Hrs  T(C): 36.7 (23 Jun 2021 04:42), Max: 36.7 (22 Jun 2021 11:32)  T(F): 98 (23 Jun 2021 04:42), Max: 98 (22 Jun 2021 11:32)  HR: 78 (23 Jun 2021 04:42) (73 - 93)  BP: 106/64 (23 Jun 2021 04:42) (100/65 - 119/51)  BP(mean): --  RR: 18 (23 Jun 2021 04:42) (18 - 19)  SpO2: 95% (23 Jun 2021 04:42) (92% - 98%)     ON PE:  General: alert and awake  Abdomen: No flank pain reported  : Ortiz intact. Urine grossly clear. CBI is off    LABS:                        12.8   10.88 )-----------( 202      ( 23 Jun 2021 07:07 )             37.4                 RADIOLOGY & ADDITIONAL TESTS:  
Patient seen and examined resting in bed    Vital Signs Last 24 Hrs  T(C): 37.2 (22 Jun 2021 04:43), Max: 37.2 (22 Jun 2021 04:43)  T(F): 98.9 (22 Jun 2021 04:43), Max: 98.9 (22 Jun 2021 04:43)  HR: 79 (22 Jun 2021 04:43) (79 - 96)  BP: 129/78 (22 Jun 2021 04:43) (129/78 - 147/77)  BP(mean): --  RR: 18 (22 Jun 2021 04:43) (18 - 18)  SpO2: 95% (22 Jun 2021 04:43) (92% - 95%)    Gen: NAD  HEENT: NCAT  Abd: Soft  : Ortiz draining pink urine with CBI                          12.6   10.18 )-----------( 184      ( 22 Jun 2021 07:06 )             36.5     06-21    131<L>  |  98  |  22.9<H>  ----------------------------<  100<H>  4.4   |  21.0<L>  |  1.05    Ca    8.6      21 Jun 2021 05:46    TPro  8.4  /  Alb  3.3  /  TBili  1.1  /  DBili  x   /  AST  21  /  ALT  12  /  AlkPhos  73  06-21

## 2021-06-23 NOTE — DISCHARGE NOTE NURSING/CASE MANAGEMENT/SOCIAL WORK - NSCORESITESY/N_GEN_A_CORE_RD
Arlette Love is a 48 year old year old female who presents to the ED due to dizziness and headache that started at 0830. 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 Pt reports she progressed to having spotting vision and she was unable to read charts because of it. Pt reports then she started to have expressive asphasia. Pt reports she still feels like she is having trouble getting out what she wants to say. Pt denies decreased sensation, numbness, weakness, sob, chest pain, cough, or any other symptoms. Ataxia not noted, face symmetrical, limbs have equal strength on both sides. Pt A&Ox4, PERRLA, and obeys commands.     MD at bedside.     This RN wearing mask during triage; pt is wearing a mask.   
No

## 2021-06-23 NOTE — DISCHARGE NOTE NURSING/CASE MANAGEMENT/SOCIAL WORK - CAREGIVER ADDRESS
[place] - elevated sCr on admission, unclear baseline sCr  - FeUrea 28% suggestive of pre-renal etiology  - sCr downtrending  - monitor BUN/Cr  - avoid nephrotoxic agents  - strict I&Os

## 2021-06-23 NOTE — DISCHARGE NOTE NURSING/CASE MANAGEMENT/SOCIAL WORK - PATIENT PORTAL LINK FT
You can access the FollowMyHealth Patient Portal offered by [facility] by registering at the following website: [email or web address]. By joining [facility]’s FollowMyHealth portal, you will also be able to view your health information using other applications (apps) compatible with our system.

## 2021-06-23 NOTE — PROGRESS NOTE ADULT - ASSESSMENT
74 yo M w/ PMH of Pulmonary Fibrosis on home O2, HTN, Asthma, Anxiety, BPH with chronic indwelling Ortiz presenting with chief complaint of hematuria.     #Malfunction of chronic indwelling Ortiz POA w/ hematuria 2/2 UTI  - Ortiz exchanged 6/19 in ED  - S/p CBI  - UCx: Enterobacter clocae, sensitive to cephalosporins  - Urology consult appreciated  - C/w CBI  - C/w Cefriaxone, d/c on Vantin  - Monitor CBC  - C/w Flomax    #Pulmonary Fibrosis on home O2  - currently not in acute exacerbation  - C/w O2 prn    #HTN  - C/w Lisinopril, Norvasc    #Anxiety  - C/w Ativan    #Asthma  - Not in acute exacerbation  - C/w Symbicort    DVT ppx: SCDs in setting of hematuria    Dispo/LOS anticipation: Possible d/c today if no recurrence of clotting and Ortiz malfunction.  
74 yo M w/ PMH of Pulmonary Fibrosis on home O2, HTN, Asthma, Anxiety, BPH with chronic indwelling Ortiz presenting with chief complaint of hematuria.     #Malfunction of chronic indwelling Ortiz POA w/ hematuria 2/2 UTI  - Ortiz exchanged 6/19 in ED  - S/p CBI  - UCx: Enterobacter clocae, follow sensitivies  - Urology consult appreciated  - C/w Cefriaxone  - Monitor CBC  - C/w Flomax    #Pulmonary Fibrosis on home O2  - currently not in acute exacerbation  - C/w O2 prn    #HTN  - C/w Lisinopril, Norvasc    #Anxiety  - C/w Ativan    #Asthma  - Not in acute exacerbation  - C/w Symbicort    DVT ppx: SCDs in setting of hematuria  
Gross hematuria and chronic urinary retention

## 2021-06-29 ENCOUNTER — APPOINTMENT (OUTPATIENT)
Dept: CARDIOLOGY | Facility: CLINIC | Age: 76
End: 2021-06-29

## 2021-07-08 ENCOUNTER — APPOINTMENT (OUTPATIENT)
Dept: CT IMAGING | Facility: CLINIC | Age: 76
End: 2021-07-08
Payer: MEDICARE

## 2021-07-08 ENCOUNTER — OUTPATIENT (OUTPATIENT)
Dept: OUTPATIENT SERVICES | Facility: HOSPITAL | Age: 76
LOS: 1 days | End: 2021-07-08
Payer: MEDICARE

## 2021-07-08 DIAGNOSIS — Z87.81 PERSONAL HISTORY OF (HEALED) TRAUMATIC FRACTURE: Chronic | ICD-10-CM

## 2021-07-08 DIAGNOSIS — Z98.89 OTHER SPECIFIED POSTPROCEDURAL STATES: Chronic | ICD-10-CM

## 2021-07-08 DIAGNOSIS — J84.112 IDIOPATHIC PULMONARY FIBROSIS: ICD-10-CM

## 2021-07-08 PROCEDURE — 71250 CT THORAX DX C-: CPT

## 2021-07-08 PROCEDURE — 71250 CT THORAX DX C-: CPT | Mod: 26,ME

## 2021-07-08 PROCEDURE — G1004: CPT

## 2021-07-12 ENCOUNTER — NON-APPOINTMENT (OUTPATIENT)
Age: 76
End: 2021-07-12

## 2021-08-02 ENCOUNTER — NON-APPOINTMENT (OUTPATIENT)
Age: 76
End: 2021-08-02

## 2021-08-05 ENCOUNTER — APPOINTMENT (OUTPATIENT)
Dept: CARDIOLOGY | Facility: CLINIC | Age: 76
End: 2021-08-05

## 2021-08-23 ENCOUNTER — INPATIENT (INPATIENT)
Facility: HOSPITAL | Age: 76
LOS: 6 days | Discharge: HOME | DRG: 981 | End: 2021-08-30
Attending: SPECIALIST | Admitting: HOSPITALIST
Payer: MEDICARE

## 2021-08-23 VITALS
TEMPERATURE: 99 F | OXYGEN SATURATION: 97 % | SYSTOLIC BLOOD PRESSURE: 135 MMHG | HEIGHT: 71 IN | RESPIRATION RATE: 22 BRPM | DIASTOLIC BLOOD PRESSURE: 76 MMHG | WEIGHT: 195.11 LBS | HEART RATE: 120 BPM

## 2021-08-23 DIAGNOSIS — Z87.81 PERSONAL HISTORY OF (HEALED) TRAUMATIC FRACTURE: Chronic | ICD-10-CM

## 2021-08-23 DIAGNOSIS — Z98.89 OTHER SPECIFIED POSTPROCEDURAL STATES: Chronic | ICD-10-CM

## 2021-08-23 DIAGNOSIS — A41.9 SEPSIS, UNSPECIFIED ORGANISM: ICD-10-CM

## 2021-08-23 LAB
ALBUMIN SERPL ELPH-MCNC: 3.8 G/DL — SIGNIFICANT CHANGE UP (ref 3.3–5.2)
ALP SERPL-CCNC: 79 U/L — SIGNIFICANT CHANGE UP (ref 40–120)
ALT FLD-CCNC: 18 U/L — SIGNIFICANT CHANGE UP
ANION GAP SERPL CALC-SCNC: 13 MMOL/L — SIGNIFICANT CHANGE UP (ref 5–17)
APPEARANCE UR: ABNORMAL
APTT BLD: 25.8 SEC — LOW (ref 27.5–35.5)
AST SERPL-CCNC: 25 U/L — SIGNIFICANT CHANGE UP
BACTERIA # UR AUTO: ABNORMAL
BASE EXCESS BLDV CALC-SCNC: -0.5 MMOL/L — SIGNIFICANT CHANGE UP (ref -2–3)
BASOPHILS # BLD AUTO: 0 K/UL — SIGNIFICANT CHANGE UP (ref 0–0.2)
BASOPHILS NFR BLD AUTO: 0 % — SIGNIFICANT CHANGE UP (ref 0–2)
BILIRUB SERPL-MCNC: 1.3 MG/DL — SIGNIFICANT CHANGE UP (ref 0.4–2)
BILIRUB UR-MCNC: NEGATIVE — SIGNIFICANT CHANGE UP
BUN SERPL-MCNC: 28.2 MG/DL — HIGH (ref 8–20)
CA-I SERPL-SCNC: 1.15 MMOL/L — SIGNIFICANT CHANGE UP (ref 1.15–1.33)
CALCIUM SERPL-MCNC: 9.6 MG/DL — SIGNIFICANT CHANGE UP (ref 8.6–10.2)
CHLORIDE BLDV-SCNC: 102 MMOL/L — SIGNIFICANT CHANGE UP (ref 98–107)
CHLORIDE SERPL-SCNC: 97 MMOL/L — LOW (ref 98–107)
CO2 SERPL-SCNC: 22 MMOL/L — SIGNIFICANT CHANGE UP (ref 22–29)
COLOR SPEC: YELLOW — SIGNIFICANT CHANGE UP
CREAT SERPL-MCNC: 1.37 MG/DL — HIGH (ref 0.5–1.3)
DIFF PNL FLD: ABNORMAL
EOSINOPHIL # BLD AUTO: 0 K/UL — SIGNIFICANT CHANGE UP (ref 0–0.5)
EOSINOPHIL NFR BLD AUTO: 0 % — SIGNIFICANT CHANGE UP (ref 0–6)
EPI CELLS # UR: SIGNIFICANT CHANGE UP
GAS PNL BLDV: 132 MMOL/L — LOW (ref 136–145)
GAS PNL BLDV: SIGNIFICANT CHANGE UP
GLUCOSE BLDV-MCNC: 125 MG/DL — HIGH (ref 70–99)
GLUCOSE SERPL-MCNC: 122 MG/DL — HIGH (ref 70–99)
GLUCOSE UR QL: NEGATIVE MG/DL — SIGNIFICANT CHANGE UP
HCO3 BLDV-SCNC: 24 MMOL/L — SIGNIFICANT CHANGE UP (ref 22–29)
HCT VFR BLD CALC: 35.9 % — LOW (ref 39–50)
HCT VFR BLDA CALC: 42 % — SIGNIFICANT CHANGE UP (ref 39–51)
HGB BLD CALC-MCNC: 14 G/DL — SIGNIFICANT CHANGE UP (ref 12.6–17.4)
HGB BLD-MCNC: 12.1 G/DL — LOW (ref 13–17)
INR BLD: 1.33 RATIO — HIGH (ref 0.88–1.16)
KETONES UR-MCNC: ABNORMAL
LACTATE BLDV-MCNC: 1.2 MMOL/L — SIGNIFICANT CHANGE UP (ref 0.5–2)
LEUKOCYTE ESTERASE UR-ACNC: ABNORMAL
LYMPHOCYTES # BLD AUTO: 1.4 K/UL — SIGNIFICANT CHANGE UP (ref 1–3.3)
LYMPHOCYTES # BLD AUTO: 8 % — LOW (ref 13–44)
MANUAL SMEAR VERIFICATION: SIGNIFICANT CHANGE UP
MCHC RBC-ENTMCNC: 31.8 PG — SIGNIFICANT CHANGE UP (ref 27–34)
MCHC RBC-ENTMCNC: 33.7 GM/DL — SIGNIFICANT CHANGE UP (ref 32–36)
MCV RBC AUTO: 94.5 FL — SIGNIFICANT CHANGE UP (ref 80–100)
MONOCYTES # BLD AUTO: 1.85 K/UL — HIGH (ref 0–0.9)
MONOCYTES NFR BLD AUTO: 10.6 % — SIGNIFICANT CHANGE UP (ref 2–14)
NEUTROPHILS # BLD AUTO: 14.05 K/UL — HIGH (ref 1.8–7.4)
NEUTROPHILS NFR BLD AUTO: 79.6 % — HIGH (ref 43–77)
NEUTS BAND # BLD: 0.9 % — SIGNIFICANT CHANGE UP (ref 0–8)
NITRITE UR-MCNC: NEGATIVE — SIGNIFICANT CHANGE UP
NRBC # BLD: 1 /100 — HIGH (ref 0–0)
PCO2 BLDV: 38 MMHG — LOW (ref 42–55)
PH BLDV: 7.41 — SIGNIFICANT CHANGE UP (ref 7.32–7.43)
PH UR: 6 — SIGNIFICANT CHANGE UP (ref 5–8)
PLAT MORPH BLD: NORMAL — SIGNIFICANT CHANGE UP
PLATELET # BLD AUTO: 174 K/UL — SIGNIFICANT CHANGE UP (ref 150–400)
PO2 BLDV: <42 MMHG — SIGNIFICANT CHANGE UP (ref 25–45)
POTASSIUM BLDV-SCNC: 4.9 MMOL/L — SIGNIFICANT CHANGE UP (ref 3.5–5.1)
POTASSIUM SERPL-MCNC: 4.3 MMOL/L — SIGNIFICANT CHANGE UP (ref 3.5–5.3)
POTASSIUM SERPL-SCNC: 4.3 MMOL/L — SIGNIFICANT CHANGE UP (ref 3.5–5.3)
PROT SERPL-MCNC: 9.6 G/DL — HIGH (ref 6.6–8.7)
PROT UR-MCNC: 100 MG/DL
PROTHROM AB SERPL-ACNC: 15.2 SEC — HIGH (ref 10.6–13.6)
RAPID RVP RESULT: SIGNIFICANT CHANGE UP
RBC # BLD: 3.8 M/UL — LOW (ref 4.2–5.8)
RBC # FLD: 13.3 % — SIGNIFICANT CHANGE UP (ref 10.3–14.5)
RBC BLD AUTO: NORMAL — SIGNIFICANT CHANGE UP
RBC CASTS # UR COMP ASSIST: >50 /HPF (ref 0–4)
SAO2 % BLDV: 56.3 % — SIGNIFICANT CHANGE UP
SARS-COV-2 RNA SPEC QL NAA+PROBE: SIGNIFICANT CHANGE UP
SODIUM SERPL-SCNC: 132 MMOL/L — LOW (ref 135–145)
SP GR SPEC: 1.01 — SIGNIFICANT CHANGE UP (ref 1.01–1.02)
UROBILINOGEN FLD QL: NEGATIVE MG/DL — SIGNIFICANT CHANGE UP
VARIANT LYMPHS # BLD: 0.9 % — SIGNIFICANT CHANGE UP (ref 0–6)
WBC # BLD: 17.45 K/UL — HIGH (ref 3.8–10.5)
WBC # FLD AUTO: 17.45 K/UL — HIGH (ref 3.8–10.5)
WBC UR QL: ABNORMAL

## 2021-08-23 PROCEDURE — 71045 X-RAY EXAM CHEST 1 VIEW: CPT | Mod: 26

## 2021-08-23 PROCEDURE — 93010 ELECTROCARDIOGRAM REPORT: CPT

## 2021-08-23 PROCEDURE — 99285 EMERGENCY DEPT VISIT HI MDM: CPT

## 2021-08-23 PROCEDURE — 99223 1ST HOSP IP/OBS HIGH 75: CPT

## 2021-08-23 RX ORDER — PIPERACILLIN AND TAZOBACTAM 4; .5 G/20ML; G/20ML
3.38 INJECTION, POWDER, LYOPHILIZED, FOR SOLUTION INTRAVENOUS ONCE
Refills: 0 | Status: COMPLETED | OUTPATIENT
Start: 2021-08-23 | End: 2021-08-23

## 2021-08-23 RX ORDER — CEFTRIAXONE 500 MG/1
1000 INJECTION, POWDER, FOR SOLUTION INTRAMUSCULAR; INTRAVENOUS EVERY 24 HOURS
Refills: 0 | Status: COMPLETED | OUTPATIENT
Start: 2021-08-23 | End: 2021-08-29

## 2021-08-23 RX ORDER — HEPARIN SODIUM 5000 [USP'U]/ML
5000 INJECTION INTRAVENOUS; SUBCUTANEOUS EVERY 8 HOURS
Refills: 0 | Status: DISCONTINUED | OUTPATIENT
Start: 2021-08-23 | End: 2021-08-30

## 2021-08-23 RX ORDER — SODIUM CHLORIDE 9 MG/ML
2000 INJECTION, SOLUTION INTRAVENOUS ONCE
Refills: 0 | Status: COMPLETED | OUTPATIENT
Start: 2021-08-23 | End: 2021-08-23

## 2021-08-23 RX ORDER — ALBUTEROL 90 UG/1
2 AEROSOL, METERED ORAL EVERY 6 HOURS
Refills: 0 | Status: DISCONTINUED | OUTPATIENT
Start: 2021-08-23 | End: 2021-08-30

## 2021-08-23 RX ORDER — ACETAMINOPHEN 500 MG
650 TABLET ORAL EVERY 6 HOURS
Refills: 0 | Status: DISCONTINUED | OUTPATIENT
Start: 2021-08-23 | End: 2021-08-28

## 2021-08-23 RX ORDER — AMLODIPINE BESYLATE 2.5 MG/1
5 TABLET ORAL DAILY
Refills: 0 | Status: DISCONTINUED | OUTPATIENT
Start: 2021-08-23 | End: 2021-08-30

## 2021-08-23 RX ORDER — ASPIRIN/CALCIUM CARB/MAGNESIUM 324 MG
81 TABLET ORAL DAILY
Refills: 0 | Status: DISCONTINUED | OUTPATIENT
Start: 2021-08-23 | End: 2021-08-30

## 2021-08-23 RX ORDER — ACETAMINOPHEN 500 MG
975 TABLET ORAL ONCE
Refills: 0 | Status: COMPLETED | OUTPATIENT
Start: 2021-08-23 | End: 2021-08-23

## 2021-08-23 RX ORDER — TAMSULOSIN HYDROCHLORIDE 0.4 MG/1
0.8 CAPSULE ORAL DAILY
Refills: 0 | Status: DISCONTINUED | OUTPATIENT
Start: 2021-08-23 | End: 2021-08-30

## 2021-08-23 RX ORDER — CHOLECALCIFEROL (VITAMIN D3) 125 MCG
1000 CAPSULE ORAL DAILY
Refills: 0 | Status: DISCONTINUED | OUTPATIENT
Start: 2021-08-23 | End: 2021-08-30

## 2021-08-23 RX ADMIN — Medication 975 MILLIGRAM(S): at 17:34

## 2021-08-23 RX ADMIN — SODIUM CHLORIDE 2000 MILLILITER(S): 9 INJECTION, SOLUTION INTRAVENOUS at 17:39

## 2021-08-23 RX ADMIN — Medication 975 MILLIGRAM(S): at 18:34

## 2021-08-23 RX ADMIN — PIPERACILLIN AND TAZOBACTAM 3.38 GRAM(S): 4; .5 INJECTION, POWDER, LYOPHILIZED, FOR SOLUTION INTRAVENOUS at 18:39

## 2021-08-23 RX ADMIN — PIPERACILLIN AND TAZOBACTAM 200 GRAM(S): 4; .5 INJECTION, POWDER, LYOPHILIZED, FOR SOLUTION INTRAVENOUS at 17:39

## 2021-08-23 RX ADMIN — CEFTRIAXONE 100 MILLIGRAM(S): 500 INJECTION, POWDER, FOR SOLUTION INTRAMUSCULAR; INTRAVENOUS at 23:36

## 2021-08-23 RX ADMIN — HEPARIN SODIUM 5000 UNIT(S): 5000 INJECTION INTRAVENOUS; SUBCUTANEOUS at 23:36

## 2021-08-23 NOTE — ED PROVIDER NOTE - NS_ ATTENDINGSCRIBEDETAILS _ED_A_ED_FT
I, Benny Vann, performed the initial face to face bedside interview with this patient regarding history of present illness, review of symptoms and relevant past medical, social and family history.  I completed an independent physical examination.  I was the initial provider who evaluated this patient. I have signed out the follow up of any pending tests (i.e. labs, radiological studies) to the resident.  I have communicated the patient’s plan of care and disposition with the resident.  The history, relevant review of systems, past medical and surgical history, medical decision making, and physical examination was documented by the scribe in my presence and I attest to the accuracy of the documentation.

## 2021-08-23 NOTE — ED ADULT NURSE REASSESSMENT NOTE - NS ED NURSE REASSESS COMMENT FT1
received care of patient at 18:45, patient A&Ox4, resting comfortably in stretcher. VSS at this time. f/c patient and draining. pending admission bed placement.

## 2021-08-23 NOTE — PATIENT PROFILE ADULT - NSPROPTRIGHTBILLOFRIGHTS_GEN_A_NUR
Department of Anesthesiology  Preprocedure Note       Name:  Quoc Santos   Age:  36 y.o.  :  1984                                          MRN:  [id number]         Date:  2018      Surgeon: Turner Barthel):  Jeanette Lombardi MD    Procedure: Procedure(s):  HOLMIUM - NEPHROLITHOTOMY PERCUTANEOUS, C-ARM, LITHOCLAST, PT COMING FROM INTERV RADIOLOGY    Medications prior to admission:   Prior to Admission medications    Medication Sig Start Date End Date Taking? Authorizing Provider   acetaminophen (TYLENOL) 325 MG tablet Take 650 mg by mouth every 6 hours as needed for Pain   Yes Historical Provider, MD   NIFEdipine (PROCARDIA XL) 30 MG extended release tablet Take 30 mg by mouth daily 18  Yes Historical Provider, MD   oxyCODONE-acetaminophen (PERCOCET) 5-325 MG per tablet Take 1 tablet by mouth every 6 hours as needed. . 18  Yes Historical Provider, MD   ibuprofen (ADVIL;MOTRIN) 800 MG tablet Take 800 mg by mouth every 8 hours as needed LAST DOSE 2018 7/10/18  Yes Historical Provider, MD   ondansetron (ZOFRAN-ODT) 8 MG TBDP disintegrating tablet Take 8 mg by mouth every 8 hours as needed 18   Historical Provider, MD   GAS RELIEF 80 MG chewable tablet Take 80 mg by mouth as needed 7/10/18   Historical Provider, MD   oxybutynin (DITROPAN-XL) 10 MG extended release tablet Take 1 tablet by mouth daily 8/3/17   Jeanette Lombardi MD   docusate sodium (COLACE) 100 MG capsule Take 100 mg by mouth daily as needed     Historical Provider, MD       Current medications:    Current Facility-Administered Medications   Medication Dose Route Frequency Provider Last Rate Last Dose    simethicone (MYLICON) chewable tablet 80 mg  80 mg Oral 4x Daily PRN Laron Yao MD        NIFEdipine (PROCARDIA XL) extended release tablet 30 mg  30 mg Oral Daily Laron Yao MD        oxybutynin (DITROPAN-XL) extended release tablet 10 mg  10 mg Oral Daily Laron Yao MD        0.9 % sodium chloride intravenous access     STATES VEINS ARE SMALL AND HARD TO SEE REQUEST IV BE PUT IN HAND    Fall 2016    TRIPPED OVER H2O HOSE IN BASEMENT FRACTURED RIGHT ANKLE    Flank pain     Gall stones 2018    Heart palpitations     Hematuria     Hypertension 2014    ON RX    Kidney stones 2004, 2007 & 2016 2018    PONV (postoperative nausea and vomiting)     request anti emetic    Sensation of pressure in bladder area     Ureteral stent retained 2010    tami    Urinary incontinence     Urinary leakage     Urinary urgency     Vitamin D deficiency     Wears glasses        Past Surgical History:        Procedure Laterality Date    ANKLE FRACTURE SURGERY Right 2016    WITH HARDWARE    CHOLECYSTECTOMY  2018    COLPOSCOPY  2003    at age 26 years s/p abnormal pap smear    CYSTOSCOPY  4/20/16    video urodynamics/cystoscopy under local anesthesia    CYSTOSCOPY  10/08/2016    WITH BILAT STENTS FOR KIDNEY STONES AT [facility]    CYSTOSCOPY  2004    WITH STONE BASKETING    CYSTOSCOPY Bilateral 10/13/2016    bilat retrogrades, bilat stent change, left stone basketing, right holmium laser    CYSTOSCOPY  09/07/2018    occ. balloon    HERNIA REPAIR  0354    UMBILICAL    LITHOTRIPSY  2012    NEPHROSTOMY Left 09/07/2018    HOLMIUM - NEPHROLITHOTOMY PERCUTANEOUS--lithoclast    OTHER SURGICAL HISTORY Bilateral 2010    sent removal; cysto    PELVIC LAPAROSCOPY  2014    with d and c, hysteroscopy    TUBAL LIGATION      URETER STENT PLACEMENT  2007       Social History:    Social History   Substance Use Topics    Smoking status: Former Smoker     Quit date: 7/31/2005    Smokeless tobacco: Never Used    Alcohol use No                                Counseling given: Not Answered      Vital Signs (Current):   Vitals:    09/07/18 1510 09/07/18 1515 09/07/18 1520 09/07/18 1605   BP: (!) 151/89 137/76 137/76 (!) 145/87   Pulse: 85 94 98 96   Resp: 17 18 21 16   Temp:    97.5 °F (36.4 °C)   TempSrc:    Axillary   SpO2: 100% patient

## 2021-08-23 NOTE — H&P ADULT - ASSESSMENT
77 y/o male with PMH of BPH on chronic reilly, pulmonary fibrosis on home oxygen 2L, HTN came to the ED complaining of palpitation and low grade fever x 2 days. Patient said his HR was 137 at home, was also having trouble breathing with minimal exertion, reported low grade fever 100 at home. He called his pulmonologist which suggested to come to the ED. Of note, he saw his urologist a week ago, reilly changed. He has no nausea, vomiting, chest pain, abdominal pain, hematuria, diarrhea.   In the ED, patient with sepsis due to UTI, reilly changed.     UTI   Admit to medical floor   UA noted   Urine culture sent, follow up   Continue antibiotic     Sepsis 2/2 UTI  Leukocytosis: 17.45 with left shift   Temp: 102.2  Blood and urine culture sent, follow up   IVF given  Continue antibiotic   Tylenol PRN for fever/pain  Monitor BP, keep SBP > 90 and MAP >65    BRITNEY   Likely due to sepsis/UTI   Cr: 1.37 (baseline 1.05)   IVF given   Monitor renal function   Avoid nephrotoxic agent     HTN  Hold Lisinopril 20mg given BRITNEY; resume as needed  Amlodipine 5mg   Monitor BP     Pulmonary fibrosis   Oxygen therapy   Albuterol     BPH   Flomax 0.8mg     Anxiety   Xanax 0.5mg bid PRN     Supportive   DVT prophylaxis: Heparin sc   Diet DASH     Plan of care discussed with patient

## 2021-08-23 NOTE — H&P ADULT - HISTORY OF PRESENT ILLNESS
75 y/o male with PMH of BPH on chronic reilly, pulmonary fibrosis on home oxygen 2L, HTN came to the ED complaining of palpitation and low grade fever x 2 days. Patient said his HR was 137 at home, was also having trouble breathing with minimal exertion, reported low grade fever 100 at home. He called his pulmonologist which suggested to come to the ED. Of note, he saw his urologist a week ago, reilly changed. He has no nausea, vomiting, chest pain, abdominal pain, hematuria, diarrhea.

## 2021-08-23 NOTE — ED PROVIDER NOTE - PROGRESS NOTE DETAILS
Anna Ramirez, resident MD: UA consistent with UTI. will admit for further IV abx until cultures result as pt has chronic reilly in place and BRITNEY noted. pt is aware of plan and agrees with admission.

## 2021-08-23 NOTE — ED PROVIDER NOTE - OBJECTIVE STATEMENT
75 y/o male with PMHx of HTN, asthma, BPH, Bronchiectasis c/o palpitations and fever. Pt called his pulmonologist Dr. Mg who told him to come here.

## 2021-08-23 NOTE — ED ADULT NURSE NOTE - NSIMPLEMENTINTERV_GEN_ALL_ED
Implemented All Fall Risk Interventions:  [place] to call system. Call bell, personal items and telephone within reach. Instruct patient to call for assistance. Room bathroom lighting operational. Non-slip footwear when patient is off stretcher. Physically safe environment: no spills, clutter or unnecessary equipment. Stretcher in lowest position, wheels locked, appropriate side rails in place. Provide visual cue, wrist band, yellow gown, etc. Monitor gait and stability. Monitor for mental status changes and reorient to person, place, and time. Review medications for side effects contributing to fall risk. Reinforce activity limits and safety measures with patient and family.

## 2021-08-23 NOTE — H&P ADULT - NSHPPHYSICALEXAM_GEN_ALL_CORE
Vital Signs Last 24 Hrs  T(C): 37.1 (23 Aug 2021 18:35), Max: 39 (23 Aug 2021 17:04)  T(F): 98.7 (23 Aug 2021 18:35), Max: 102.2 (23 Aug 2021 17:04)  HR: 107 (23 Aug 2021 18:35) (107 - 120)  BP: 127/78 (23 Aug 2021 18:35) (127/78 - 135/76)  BP(mean): --  RR: 20 (23 Aug 2021 18:35) (20 - 22)  SpO2: 96% (23 Aug 2021 18:35) (96% - 97%)

## 2021-08-23 NOTE — ED PROVIDER NOTE - CHIEF COMPLAINT
Have Your Spot(S) Been Treated In The Past?: has not been treated
Hpi Title: Evaluation of Skin Lesions
Year Removed: 1900
The patient is a 76y Male complaining of palpitations.

## 2021-08-23 NOTE — ED ADULT NURSE NOTE - OBJECTIVE STATEMENT
Assumed care at 1713 pt co fevers, chills, for a couple of days, pt denies any SOB, headache, flank pain, chest pain, NV. diarrhea. pt has Diana that was changed 8/16/21. pt placed on cardiac monitor, and pulse ox. pt on 2 L home ox at all times due to pulmonary fibrosis. Assumed care at 1713 pt co fevers, chills, that started today, pt called his PMD and was told to come to ED, pt denies any SOB, headache, flank pain, chest pain, NV. diarrhea. pt has Diana that was changed 8/16/21. pt placed on cardiac monitor, and pulse ox. pt on 2 L home ox at all times due to pulmonary fibrosis.

## 2021-08-23 NOTE — ED PROVIDER NOTE - GENITOURINARY, MLM
85 feet ;  also assessed ambulation 20 feet without assistive device- required contact guard however pt hesitant
Ortiz catheter in place

## 2021-08-24 LAB
ANION GAP SERPL CALC-SCNC: 13 MMOL/L — SIGNIFICANT CHANGE UP (ref 5–17)
BUN SERPL-MCNC: 25 MG/DL — HIGH (ref 8–20)
CALCIUM SERPL-MCNC: 8.9 MG/DL — SIGNIFICANT CHANGE UP (ref 8.6–10.2)
CHLORIDE SERPL-SCNC: 97 MMOL/L — LOW (ref 98–107)
CO2 SERPL-SCNC: 21 MMOL/L — LOW (ref 22–29)
COVID-19 SPIKE DOMAIN AB INTERP: POSITIVE
COVID-19 SPIKE DOMAIN ANTIBODY RESULT: >250 U/ML — HIGH
CREAT SERPL-MCNC: 1.35 MG/DL — HIGH (ref 0.5–1.3)
GLUCOSE SERPL-MCNC: 89 MG/DL — SIGNIFICANT CHANGE UP (ref 70–99)
HCT VFR BLD CALC: 36.1 % — LOW (ref 39–50)
HGB BLD-MCNC: 12.3 G/DL — LOW (ref 13–17)
MCHC RBC-ENTMCNC: 31.8 PG — SIGNIFICANT CHANGE UP (ref 27–34)
MCHC RBC-ENTMCNC: 34.1 GM/DL — SIGNIFICANT CHANGE UP (ref 32–36)
MCV RBC AUTO: 93.3 FL — SIGNIFICANT CHANGE UP (ref 80–100)
PLATELET # BLD AUTO: 158 K/UL — SIGNIFICANT CHANGE UP (ref 150–400)
POTASSIUM SERPL-MCNC: 4.3 MMOL/L — SIGNIFICANT CHANGE UP (ref 3.5–5.3)
POTASSIUM SERPL-SCNC: 4.3 MMOL/L — SIGNIFICANT CHANGE UP (ref 3.5–5.3)
RBC # BLD: 3.87 M/UL — LOW (ref 4.2–5.8)
RBC # FLD: 13.5 % — SIGNIFICANT CHANGE UP (ref 10.3–14.5)
SARS-COV-2 IGG+IGM SERPL QL IA: >250 U/ML — HIGH
SARS-COV-2 IGG+IGM SERPL QL IA: POSITIVE
SODIUM SERPL-SCNC: 131 MMOL/L — LOW (ref 135–145)
WBC # BLD: 17.9 K/UL — HIGH (ref 3.8–10.5)
WBC # FLD AUTO: 17.9 K/UL — HIGH (ref 3.8–10.5)

## 2021-08-24 PROCEDURE — 99233 SBSQ HOSP IP/OBS HIGH 50: CPT

## 2021-08-24 RX ADMIN — Medication 81 MILLIGRAM(S): at 12:00

## 2021-08-24 RX ADMIN — HEPARIN SODIUM 5000 UNIT(S): 5000 INJECTION INTRAVENOUS; SUBCUTANEOUS at 22:12

## 2021-08-24 RX ADMIN — HEPARIN SODIUM 5000 UNIT(S): 5000 INJECTION INTRAVENOUS; SUBCUTANEOUS at 05:18

## 2021-08-24 RX ADMIN — Medication 1000 UNIT(S): at 12:00

## 2021-08-24 RX ADMIN — AMLODIPINE BESYLATE 5 MILLIGRAM(S): 2.5 TABLET ORAL at 05:18

## 2021-08-24 RX ADMIN — HEPARIN SODIUM 5000 UNIT(S): 5000 INJECTION INTRAVENOUS; SUBCUTANEOUS at 12:02

## 2021-08-24 RX ADMIN — Medication 0.5 MILLIGRAM(S): at 12:50

## 2021-08-24 RX ADMIN — CEFTRIAXONE 100 MILLIGRAM(S): 500 INJECTION, POWDER, FOR SOLUTION INTRAMUSCULAR; INTRAVENOUS at 22:13

## 2021-08-24 RX ADMIN — Medication 1 TABLET(S): at 12:00

## 2021-08-24 RX ADMIN — TAMSULOSIN HYDROCHLORIDE 0.8 MILLIGRAM(S): 0.4 CAPSULE ORAL at 12:01

## 2021-08-24 NOTE — PROGRESS NOTE ADULT - SUBJECTIVE AND OBJECTIVE BOX
CC: UTI (23 Aug 2021 20:52)    INTERVAL HPI/OVERNIGHT EVENTS:  no acute events overnight  without acute complaints    Vital Signs Last 24 Hrs  T(C): 37.5 (24 Aug 2021 10:26), Max: 39 (23 Aug 2021 17:04)  T(F): 99.5 (24 Aug 2021 10:26), Max: 102.2 (23 Aug 2021 17:04)  HR: 104 (24 Aug 2021 10:) (95 - 120)  BP: 116/61 (24 Aug 2021 10:) (114/71 - 144/75)  BP(mean): --  RR: 20 (24 Aug 2021 10:26) (18 - 22)  SpO2: 95% (24 Aug 2021 10:) (94% - 97%)    PHYSICAL EXAM:  General: Well developed; in no acute distress; anxious appearing  Eyes: PERRLA, EOMI; conjunctiva and sclera clear  Neck: Supple; non tender; no masses  Respiratory: No wheezes, rales or rhonchi  Cardiovascular: Regular rate and rhythm. S1 and S2 Normal; No murmurs, gallops or rubs  Gastrointestinal: Soft non-tender non-distended; Normal bowel sounds  Genitourinary: No costovertebral angle tenderness  Extremities: Normal range of motion, No clubbing, cyanosis or edema  Vascular: Peripheral pulses palpable 2+ bilaterally  Neurological: Alert and oriented x4  Psychiatric: Cooperative and appropriate    I&O's Detail    23 Aug 2021 07:01  -  24 Aug 2021 07:00  --------------------------------------------------------  IN:  Total IN: 0 mL    OUT:    Indwelling Catheter - Urethral (mL): 510 mL  Total OUT: 510 mL    Total NET: -510 mL                        12.3   17.90 )-----------( 158      ( 24 Aug 2021 07:16 )             36.1     24 Aug 2021 07:16    131    |  97     |  25.0   ----------------------------<  89     4.3     |  21.0   |  1.35     Ca    8.9        24 Aug 2021 07:16    TPro  9.6    /  Alb  3.8    /  TBili  1.3    /  DBili  x      /  AST  25     /  ALT  18     /  AlkPhos  79     23 Aug 2021 17:19    PT/INR - ( 23 Aug 2021 17:19 )   PT: 15.2 sec;   INR: 1.33 ratio      PTT - ( 23 Aug 2021 17:19 )  PTT:25.8 sec    LIVER FUNCTIONS - ( 23 Aug 2021 17:19 )  Alb: 3.8 g/dL / Pro: 9.6 g/dL / ALK PHOS: 79 U/L / ALT: 18 U/L / AST: 25 U/L / GGT: x           Urinalysis Basic - ( 23 Aug 2021 18:31 )    Color: Yellow / Appearance: Cloudy / S.015 / pH: x  Gluc: x / Ketone: Small  / Bili: Negative / Urobili: Negative mg/dL   Blood: x / Protein: 100 mg/dL / Nitrite: Negative   Leuk Esterase: Moderate / RBC: >50 /HPF / WBC 26-50   Sq Epi: x / Non Sq Epi: Few / Bacteria: Few    MEDICATIONS  (STANDING):  amLODIPine   Tablet 5 milliGRAM(s) Oral daily  aspirin  chewable 81 milliGRAM(s) Oral daily  cefTRIAXone   IVPB 1000 milliGRAM(s) IV Intermittent every 24 hours  cholecalciferol 1000 Unit(s) Oral daily  heparin   Injectable 5000 Unit(s) SubCutaneous every 8 hours  multivitamin 1 Tablet(s) Oral daily  tamsulosin 0.8 milliGRAM(s) Oral daily    MEDICATIONS  (PRN):  acetaminophen   Tablet .. 650 milliGRAM(s) Oral every 6 hours PRN Temp greater or equal to 38C (100.4F), Mild Pain (1 - 3)  ALBUTerol    90 MICROgram(s) HFA Inhaler 2 Puff(s) Inhalation every 6 hours PRN Shortness of Breath and/or Wheezing  LORazepam     Tablet 0.5 milliGRAM(s) Oral two times a day PRN Anxiety      RADIOLOGY & ADDITIONAL TESTS:

## 2021-08-24 NOTE — PROGRESS NOTE ADULT - ASSESSMENT
75 y/o male with PMH of BPH on chronic reilly, pulmonary fibrosis on home oxygen 2L, HTN came to the ED complaining of palpitation and low grade fever x 2 days. Patient said his HR was 137 at home, was also having trouble breathing with minimal exertion, reported low grade fever 100 at home. He called his pulmonologist which suggested to come to the ED. Of note, he saw his urologist a week ago, reilly changed. He has no nausea, vomiting, chest pain, abdominal pain, hematuria, diarrhea.   In the ED, patient with sepsis due to UTI, reilly changed.     #UTI 2/2 reilly catheter; sepsis present on admission now resolved  - UA noted   - follow up with urine culture  - Continue antibiotic for now  - reilly replaced on 8/23 - chronic in nature    #BRITNEY   - Likely due to sepsis/UTI   - Cr: 1.37 (baseline 1.05)   - IVF given - will continue  - Avoid nephrotoxic agent     #HTN  - Hold Lisinopril 20mg given BRITNEY; resume as needed  - Amlodipine 5mg   - Monitor BP     #Pulmonary fibrosis - CXR appears unchanged  - Oxygen therapy   - Albuterol     #BPH with reilly for last year; replaced on 8/23  - continue with reilly  - c/w Flomax 0.8mg     #Anxiety   - c/w Xanax 0.5mg bid PRN     Supportive   DVT prophylaxis: Heparin sc   Diet DASH     Plan of care discussed with patient

## 2021-08-25 ENCOUNTER — TRANSCRIPTION ENCOUNTER (OUTPATIENT)
Age: 76
End: 2021-08-25

## 2021-08-25 LAB
ANION GAP SERPL CALC-SCNC: 13 MMOL/L — SIGNIFICANT CHANGE UP (ref 5–17)
BUN SERPL-MCNC: 42.8 MG/DL — HIGH (ref 8–20)
CALCIUM SERPL-MCNC: 9.2 MG/DL — SIGNIFICANT CHANGE UP (ref 8.6–10.2)
CHLORIDE SERPL-SCNC: 94 MMOL/L — LOW (ref 98–107)
CO2 SERPL-SCNC: 23 MMOL/L — SIGNIFICANT CHANGE UP (ref 22–29)
CREAT SERPL-MCNC: 1.2 MG/DL — SIGNIFICANT CHANGE UP (ref 0.5–1.3)
GLUCOSE SERPL-MCNC: 154 MG/DL — HIGH (ref 70–99)
HCT VFR BLD CALC: 37.9 % — LOW (ref 39–50)
HGB BLD-MCNC: 12.9 G/DL — LOW (ref 13–17)
MCHC RBC-ENTMCNC: 31.7 PG — SIGNIFICANT CHANGE UP (ref 27–34)
MCHC RBC-ENTMCNC: 34 GM/DL — SIGNIFICANT CHANGE UP (ref 32–36)
MCV RBC AUTO: 93.1 FL — SIGNIFICANT CHANGE UP (ref 80–100)
PLATELET # BLD AUTO: 175 K/UL — SIGNIFICANT CHANGE UP (ref 150–400)
POTASSIUM SERPL-MCNC: 4.4 MMOL/L — SIGNIFICANT CHANGE UP (ref 3.5–5.3)
POTASSIUM SERPL-SCNC: 4.4 MMOL/L — SIGNIFICANT CHANGE UP (ref 3.5–5.3)
RBC # BLD: 4.07 M/UL — LOW (ref 4.2–5.8)
RBC # FLD: 13.2 % — SIGNIFICANT CHANGE UP (ref 10.3–14.5)
SODIUM SERPL-SCNC: 130 MMOL/L — LOW (ref 135–145)
WBC # BLD: 15.27 K/UL — HIGH (ref 3.8–10.5)
WBC # FLD AUTO: 15.27 K/UL — HIGH (ref 3.8–10.5)

## 2021-08-25 PROCEDURE — 99233 SBSQ HOSP IP/OBS HIGH 50: CPT

## 2021-08-25 PROCEDURE — 74176 CT ABD & PELVIS W/O CONTRAST: CPT | Mod: 26

## 2021-08-25 RX ORDER — ONDANSETRON 8 MG/1
4 TABLET, FILM COATED ORAL ONCE
Refills: 0 | Status: COMPLETED | OUTPATIENT
Start: 2021-08-25 | End: 2021-08-25

## 2021-08-25 RX ORDER — ONDANSETRON 8 MG/1
4 TABLET, FILM COATED ORAL EVERY 6 HOURS
Refills: 0 | Status: DISCONTINUED | OUTPATIENT
Start: 2021-08-25 | End: 2021-08-30

## 2021-08-25 RX ADMIN — HEPARIN SODIUM 5000 UNIT(S): 5000 INJECTION INTRAVENOUS; SUBCUTANEOUS at 21:02

## 2021-08-25 RX ADMIN — Medication 0.5 MILLIGRAM(S): at 11:45

## 2021-08-25 RX ADMIN — Medication 1 TABLET(S): at 11:27

## 2021-08-25 RX ADMIN — ONDANSETRON 4 MILLIGRAM(S): 8 TABLET, FILM COATED ORAL at 12:44

## 2021-08-25 RX ADMIN — Medication 81 MILLIGRAM(S): at 11:27

## 2021-08-25 RX ADMIN — TAMSULOSIN HYDROCHLORIDE 0.8 MILLIGRAM(S): 0.4 CAPSULE ORAL at 11:26

## 2021-08-25 RX ADMIN — Medication 1000 UNIT(S): at 11:27

## 2021-08-25 RX ADMIN — Medication 0.5 MILLIGRAM(S): at 18:00

## 2021-08-25 RX ADMIN — HEPARIN SODIUM 5000 UNIT(S): 5000 INJECTION INTRAVENOUS; SUBCUTANEOUS at 06:50

## 2021-08-25 RX ADMIN — ONDANSETRON 4 MILLIGRAM(S): 8 TABLET, FILM COATED ORAL at 00:29

## 2021-08-25 RX ADMIN — CEFTRIAXONE 100 MILLIGRAM(S): 500 INJECTION, POWDER, FOR SOLUTION INTRAMUSCULAR; INTRAVENOUS at 21:01

## 2021-08-25 RX ADMIN — ONDANSETRON 4 MILLIGRAM(S): 8 TABLET, FILM COATED ORAL at 21:05

## 2021-08-25 RX ADMIN — HEPARIN SODIUM 5000 UNIT(S): 5000 INJECTION INTRAVENOUS; SUBCUTANEOUS at 11:26

## 2021-08-25 NOTE — PROGRESS NOTE ADULT - ASSESSMENT
77 y/o male with PMH of BPH on chronic reilly, pulmonary fibrosis on home oxygen 2L, HTN came to the ED complaining of palpitation and low grade fever x 2 days. Patient said his HR was 137 at home, was also having trouble breathing with minimal exertion, reported low grade fever 100 at home. He called his pulmonologist which suggested to come to the ED. Of note, he saw his urologist a week ago, reilly changed. He has no nausea, vomiting, chest pain, abdominal pain, hematuria, diarrhea.   In the ED, patient with sepsis due to UTI, reilly changed.     #UTI 2/2 reilly catheter; sepsis present on admission now resolved  - UA noted   - follow up with urine culture  - Continue antibiotic for now  - reilly replaced on 8/23 - chronic in nature    #N/V  - etiology unclear  - zofran for now  - check CT abdomen/pelvis with PO contrast    #BRITNEY   - Likely due to sepsis/UTI   - Cr: 1.37 (baseline 1.05)   - IVF given - will continue  - Avoid nephrotoxic agent     #HTN  - Hold Lisinopril 20mg given BRITNEY; resume as needed  - Amlodipine 5mg   - Monitor BP     #Pulmonary fibrosis - CXR appears unchanged  - Oxygen therapy   - Albuterol     #BPH with reilly for last year; replaced on 8/23  - continue with reilly  - c/w Flomax 0.8mg     #Anxiety   - c/w Xanax 0.5mg bid PRN     Supportive   DVT prophylaxis: Heparin sc   Diet DASH     Plan of care discussed with patient

## 2021-08-25 NOTE — PROGRESS NOTE ADULT - SUBJECTIVE AND OBJECTIVE BOX
CC: UTI (23 Aug 2021 20:52)    INTERVAL HPI/OVERNIGHT EVENTS:  this morning with N/V  patient states that he thinks is due to a cake he had yesterday (sweet foods he thinks make him vomit like this)  bilious in appearance  last BM yesterday evening    Vital Signs Last 24 Hrs  T(C): 36.4 (08-25-21 @ 10:45), Max: 36.7 (08-24-21 @ 16:57)  T(F): 97.6 (08-25-21 @ 10:45), Max: 98.1 (08-24-21 @ 16:57)  HR: 61 (08-25-21 @ 10:45) (61 - 94)  BP: 121/64 (08-25-21 @ 10:45) (101/66 - 121/64)  BP(mean): 57 (08-24-21 @ 16:57) (57 - 57)  RR: 20 (08-25-21 @ 10:45) (18 - 20)  SpO2: 97% (08-25-21 @ 10:45) (93% - 97%)    PHYSICAL EXAM:  General: Well developed; in no acute distress; anxious appearing  Eyes: PERRLA, EOMI; conjunctiva and sclera clear  Neck: Supple; non tender; no masses  Respiratory: No wheezes, rales or rhonchi  Cardiovascular: Regular rate and rhythm. S1 and S2 Normal; No murmurs, gallops or rubs  Gastrointestinal: Soft non-tender non-distended; Normal bowel sounds  Genitourinary: No costovertebral angle tenderness  Extremities: Normal range of motion, No clubbing, cyanosis or edema  Vascular: Peripheral pulses palpable 2+ bilaterally  Neurological: Alert and oriented x4  Psychiatric: Cooperative and appropriate                        12.9   15.27 )-----------( 175      ( 25 Aug 2021 10:28 )             37.9     08-25    130<L>  |  94<L>  |  42.8<H>  ----------------------------<  154<H>  4.4   |  23.0  |  1.20    Ca    9.2      25 Aug 2021 10:28    TPro  9.6<H>  /  Alb  3.8  /  TBili  1.3  /  DBili  x   /  AST  25  /  ALT  18  /  AlkPhos  79  08-23    MEDICATIONS  (STANDING):  amLODIPine   Tablet 5 milliGRAM(s) Oral daily  aspirin  chewable 81 milliGRAM(s) Oral daily  cefTRIAXone   IVPB 1000 milliGRAM(s) IV Intermittent every 24 hours  cholecalciferol 1000 Unit(s) Oral daily  heparin   Injectable 5000 Unit(s) SubCutaneous every 8 hours  multivitamin 1 Tablet(s) Oral daily  tamsulosin 0.8 milliGRAM(s) Oral daily    MEDICATIONS  (PRN):  acetaminophen   Tablet .. 650 milliGRAM(s) Oral every 6 hours PRN Temp greater or equal to 38C (100.4F), Mild Pain (1 - 3)  ALBUTerol    90 MICROgram(s) HFA Inhaler 2 Puff(s) Inhalation every 6 hours PRN Shortness of Breath and/or Wheezing  LORazepam     Tablet 0.5 milliGRAM(s) Oral two times a day PRN Anxiety      RADIOLOGY & ADDITIONAL TESTS:

## 2021-08-26 LAB
-  AMIKACIN: SIGNIFICANT CHANGE UP
-  AMOXICILLIN/CLAVULANIC ACID: SIGNIFICANT CHANGE UP
-  AMPICILLIN/SULBACTAM: SIGNIFICANT CHANGE UP
-  AMPICILLIN: SIGNIFICANT CHANGE UP
-  AZTREONAM: SIGNIFICANT CHANGE UP
-  CEFAZOLIN: SIGNIFICANT CHANGE UP
-  CEFEPIME: SIGNIFICANT CHANGE UP
-  CEFOXITIN: SIGNIFICANT CHANGE UP
-  CEFTRIAXONE: SIGNIFICANT CHANGE UP
-  CIPROFLOXACIN: SIGNIFICANT CHANGE UP
-  ERTAPENEM: SIGNIFICANT CHANGE UP
-  GENTAMICIN: SIGNIFICANT CHANGE UP
-  IMIPENEM: SIGNIFICANT CHANGE UP
-  LEVOFLOXACIN: SIGNIFICANT CHANGE UP
-  MEROPENEM: SIGNIFICANT CHANGE UP
-  NITROFURANTOIN: SIGNIFICANT CHANGE UP
-  PIPERACILLIN/TAZOBACTAM: SIGNIFICANT CHANGE UP
-  TIGECYCLINE: SIGNIFICANT CHANGE UP
-  TOBRAMYCIN: SIGNIFICANT CHANGE UP
-  TRIMETHOPRIM/SULFAMETHOXAZOLE: SIGNIFICANT CHANGE UP
ANION GAP SERPL CALC-SCNC: 15 MMOL/L — SIGNIFICANT CHANGE UP (ref 5–17)
APTT BLD: 28.7 SEC — SIGNIFICANT CHANGE UP (ref 27.5–35.5)
BLD GP AB SCN SERPL QL: SIGNIFICANT CHANGE UP
BUN SERPL-MCNC: 44.1 MG/DL — HIGH (ref 8–20)
CALCIUM SERPL-MCNC: 9.1 MG/DL — SIGNIFICANT CHANGE UP (ref 8.6–10.2)
CHLORIDE SERPL-SCNC: 94 MMOL/L — LOW (ref 98–107)
CO2 SERPL-SCNC: 25 MMOL/L — SIGNIFICANT CHANGE UP (ref 22–29)
CREAT SERPL-MCNC: 1.08 MG/DL — SIGNIFICANT CHANGE UP (ref 0.5–1.3)
CULTURE RESULTS: SIGNIFICANT CHANGE UP
GLUCOSE SERPL-MCNC: 146 MG/DL — HIGH (ref 70–99)
HCT VFR BLD CALC: 39.3 % — SIGNIFICANT CHANGE UP (ref 39–50)
HGB BLD-MCNC: 13.6 G/DL — SIGNIFICANT CHANGE UP (ref 13–17)
INR BLD: 1.25 RATIO — HIGH (ref 0.88–1.16)
MAGNESIUM SERPL-MCNC: 2.3 MG/DL — SIGNIFICANT CHANGE UP (ref 1.6–2.6)
MCHC RBC-ENTMCNC: 31.5 PG — SIGNIFICANT CHANGE UP (ref 27–34)
MCHC RBC-ENTMCNC: 34.6 GM/DL — SIGNIFICANT CHANGE UP (ref 32–36)
MCV RBC AUTO: 91 FL — SIGNIFICANT CHANGE UP (ref 80–100)
METHOD TYPE: SIGNIFICANT CHANGE UP
ORGANISM # SPEC MICROSCOPIC CNT: SIGNIFICANT CHANGE UP
ORGANISM # SPEC MICROSCOPIC CNT: SIGNIFICANT CHANGE UP
PHOSPHATE SERPL-MCNC: 4.1 MG/DL — SIGNIFICANT CHANGE UP (ref 2.4–4.7)
PLATELET # BLD AUTO: 193 K/UL — SIGNIFICANT CHANGE UP (ref 150–400)
POTASSIUM SERPL-MCNC: 4.1 MMOL/L — SIGNIFICANT CHANGE UP (ref 3.5–5.3)
POTASSIUM SERPL-SCNC: 4.1 MMOL/L — SIGNIFICANT CHANGE UP (ref 3.5–5.3)
PROTHROM AB SERPL-ACNC: 14.3 SEC — HIGH (ref 10.6–13.6)
RBC # BLD: 4.32 M/UL — SIGNIFICANT CHANGE UP (ref 4.2–5.8)
RBC # FLD: 12.7 % — SIGNIFICANT CHANGE UP (ref 10.3–14.5)
SARS-COV-2 RNA SPEC QL NAA+PROBE: SIGNIFICANT CHANGE UP
SODIUM SERPL-SCNC: 134 MMOL/L — LOW (ref 135–145)
SPECIMEN SOURCE: SIGNIFICANT CHANGE UP
WBC # BLD: 17.6 K/UL — HIGH (ref 3.8–10.5)
WBC # FLD AUTO: 17.6 K/UL — HIGH (ref 3.8–10.5)

## 2021-08-26 PROCEDURE — 44050 REDUCE BOWEL OBSTRUCTION: CPT

## 2021-08-26 PROCEDURE — 71045 X-RAY EXAM CHEST 1 VIEW: CPT | Mod: 26

## 2021-08-26 PROCEDURE — 99223 1ST HOSP IP/OBS HIGH 75: CPT

## 2021-08-26 PROCEDURE — 99233 SBSQ HOSP IP/OBS HIGH 50: CPT

## 2021-08-26 RX ORDER — HYDROMORPHONE HYDROCHLORIDE 2 MG/ML
0.5 INJECTION INTRAMUSCULAR; INTRAVENOUS; SUBCUTANEOUS EVERY 4 HOURS
Refills: 0 | Status: DISCONTINUED | OUTPATIENT
Start: 2021-08-26 | End: 2021-08-30

## 2021-08-26 RX ORDER — ONDANSETRON 8 MG/1
4 TABLET, FILM COATED ORAL EVERY 6 HOURS
Refills: 0 | Status: DISCONTINUED | OUTPATIENT
Start: 2021-08-26 | End: 2021-08-26

## 2021-08-26 RX ORDER — HYDROMORPHONE HYDROCHLORIDE 2 MG/ML
0.5 INJECTION INTRAMUSCULAR; INTRAVENOUS; SUBCUTANEOUS
Refills: 0 | Status: DISCONTINUED | OUTPATIENT
Start: 2021-08-26 | End: 2021-08-26

## 2021-08-26 RX ORDER — ONDANSETRON 8 MG/1
4 TABLET, FILM COATED ORAL ONCE
Refills: 0 | Status: DISCONTINUED | OUTPATIENT
Start: 2021-08-26 | End: 2021-08-26

## 2021-08-26 RX ORDER — FENTANYL CITRATE 50 UG/ML
50 INJECTION INTRAVENOUS
Refills: 0 | Status: DISCONTINUED | OUTPATIENT
Start: 2021-08-26 | End: 2021-08-26

## 2021-08-26 RX ORDER — SODIUM CHLORIDE 9 MG/ML
1000 INJECTION, SOLUTION INTRAVENOUS
Refills: 0 | Status: DISCONTINUED | OUTPATIENT
Start: 2021-08-26 | End: 2021-08-26

## 2021-08-26 RX ORDER — PANTOPRAZOLE SODIUM 20 MG/1
40 TABLET, DELAYED RELEASE ORAL DAILY
Refills: 0 | Status: DISCONTINUED | OUTPATIENT
Start: 2021-08-26 | End: 2021-08-30

## 2021-08-26 RX ORDER — KETOROLAC TROMETHAMINE 30 MG/ML
15 SYRINGE (ML) INJECTION EVERY 6 HOURS
Refills: 0 | Status: DISCONTINUED | OUTPATIENT
Start: 2021-08-26 | End: 2021-08-30

## 2021-08-26 RX ORDER — HYDROMORPHONE HYDROCHLORIDE 2 MG/ML
1 INJECTION INTRAMUSCULAR; INTRAVENOUS; SUBCUTANEOUS
Refills: 0 | Status: DISCONTINUED | OUTPATIENT
Start: 2021-08-26 | End: 2021-08-26

## 2021-08-26 RX ORDER — SODIUM CHLORIDE 9 MG/ML
1000 INJECTION, SOLUTION INTRAVENOUS
Refills: 0 | Status: DISCONTINUED | OUTPATIENT
Start: 2021-08-26 | End: 2021-08-28

## 2021-08-26 RX ADMIN — Medication 0.5 MILLIGRAM(S): at 05:43

## 2021-08-26 RX ADMIN — AMLODIPINE BESYLATE 5 MILLIGRAM(S): 2.5 TABLET ORAL at 05:38

## 2021-08-26 RX ADMIN — Medication 0.5 MILLIGRAM(S): at 15:02

## 2021-08-26 RX ADMIN — PANTOPRAZOLE SODIUM 40 MILLIGRAM(S): 20 TABLET, DELAYED RELEASE ORAL at 15:02

## 2021-08-26 RX ADMIN — ONDANSETRON 4 MILLIGRAM(S): 8 TABLET, FILM COATED ORAL at 05:48

## 2021-08-26 RX ADMIN — HEPARIN SODIUM 5000 UNIT(S): 5000 INJECTION INTRAVENOUS; SUBCUTANEOUS at 15:03

## 2021-08-26 RX ADMIN — TAMSULOSIN HYDROCHLORIDE 0.8 MILLIGRAM(S): 0.4 CAPSULE ORAL at 11:51

## 2021-08-26 RX ADMIN — Medication 81 MILLIGRAM(S): at 11:51

## 2021-08-26 RX ADMIN — HEPARIN SODIUM 5000 UNIT(S): 5000 INJECTION INTRAVENOUS; SUBCUTANEOUS at 05:38

## 2021-08-26 RX ADMIN — Medication 1 TABLET(S): at 11:51

## 2021-08-26 RX ADMIN — Medication 1000 UNIT(S): at 11:51

## 2021-08-26 NOTE — PROGRESS NOTE ADULT - SUBJECTIVE AND OBJECTIVE BOX
CC: UTI (23 Aug 2021 20:52)    INTERVAL HPI/OVERNIGHT EVENTS:  this morning with N/V  patient states that he thinks is due to a cake he had yesterday (sweet foods he thinks make him vomit like this)  bilious in appearance  last BM yesterday evening    Vital Signs Last 24 Hrs  T(C): 36.5 (26 Aug 2021 09:23), Max: 36.6 (26 Aug 2021 04:35)  T(F): 97.7 (26 Aug 2021 09:23), Max: 97.9 (26 Aug 2021 04:35)  HR: 80 (26 Aug 2021 09:23) (80 - 92)  BP: 125/73 (26 Aug 2021 09:23) (125/73 - 146/75)  BP(mean): --  RR: 18 (26 Aug 2021 09:23) (18 - 18)  SpO2: 96% (26 Aug 2021 09:23) (95% - 98%)    PHYSICAL EXAM:  General: Well developed; in no acute distress; anxious appearing  Eyes: PERRLA, EOMI; conjunctiva and sclera clear  Neck: Supple; non tender; no masses  Respiratory: No wheezes, rales or rhonchi  Cardiovascular: Regular rate and rhythm. S1 and S2 Normal; No murmurs, gallops or rubs  Gastrointestinal: Soft non-tender non-distended; Normal bowel sounds  Genitourinary: No costovertebral angle tenderness  Extremities: Normal range of motion, No clubbing, cyanosis or edema  Vascular: Peripheral pulses palpable 2+ bilaterally  Neurological: Alert and oriented x4  Psychiatric: Cooperative and appropriate                                   13.6   17.60 )-----------( 193      ( 26 Aug 2021 07:25 )             39.3     08-26    134<L>  |  94<L>  |  44.1<H>  ----------------------------<  146<H>  4.1   |  25.0  |  1.08    Ca    9.1      26 Aug 2021 07:25    MEDICATIONS  (STANDING):  amLODIPine   Tablet 5 milliGRAM(s) Oral daily  aspirin  chewable 81 milliGRAM(s) Oral daily  cefTRIAXone   IVPB 1000 milliGRAM(s) IV Intermittent every 24 hours  cholecalciferol 1000 Unit(s) Oral daily  heparin   Injectable 5000 Unit(s) SubCutaneous every 8 hours  multivitamin 1 Tablet(s) Oral daily  tamsulosin 0.8 milliGRAM(s) Oral daily    MEDICATIONS  (PRN):  acetaminophen   Tablet .. 650 milliGRAM(s) Oral every 6 hours PRN Temp greater or equal to 38C (100.4F), Mild Pain (1 - 3)  ALBUTerol    90 MICROgram(s) HFA Inhaler 2 Puff(s) Inhalation every 6 hours PRN Shortness of Breath and/or Wheezing  LORazepam     Tablet 0.5 milliGRAM(s) Oral two times a day PRN Anxiety      RADIOLOGY & ADDITIONAL TESTS: CC: UTI (23 Aug 2021 20:52)    INTERVAL HPI/OVERNIGHT EVENTS:  this morning with N/V  bilious in appearance  last BM 2 days prior    Vital Signs Last 24 Hrs  T(C): 36.5 (26 Aug 2021 09:23), Max: 36.6 (26 Aug 2021 04:35)  T(F): 97.7 (26 Aug 2021 09:23), Max: 97.9 (26 Aug 2021 04:35)  HR: 80 (26 Aug 2021 09:23) (80 - 92)  BP: 125/73 (26 Aug 2021 09:23) (125/73 - 146/75)  BP(mean): --  RR: 18 (26 Aug 2021 09:23) (18 - 18)  SpO2: 96% (26 Aug 2021 09:23) (95% - 98%)    PHYSICAL EXAM:  General: Well developed; in no acute distress; anxious appearing  Eyes: PERRLA, EOMI; conjunctiva and sclera clear  Neck: Supple; non tender; no masses  Respiratory: No wheezes, rales or rhonchi  Cardiovascular: Regular rate and rhythm. S1 and S2 Normal; No murmurs, gallops or rubs  Gastrointestinal: Soft non-tender non-distended; Normal bowel sounds  Genitourinary: No costovertebral angle tenderness  Extremities: Normal range of motion, No clubbing, cyanosis or edema  Vascular: Peripheral pulses palpable 2+ bilaterally  Neurological: Alert and oriented x4  Psychiatric: Cooperative and appropriate                                   13.6   17.60 )-----------( 193      ( 26 Aug 2021 07:25 )             39.3     08-26    134<L>  |  94<L>  |  44.1<H>  ----------------------------<  146<H>  4.1   |  25.0  |  1.08    Ca    9.1      26 Aug 2021 07:25    MEDICATIONS  (STANDING):  amLODIPine   Tablet 5 milliGRAM(s) Oral daily  aspirin  chewable 81 milliGRAM(s) Oral daily  cefTRIAXone   IVPB 1000 milliGRAM(s) IV Intermittent every 24 hours  cholecalciferol 1000 Unit(s) Oral daily  heparin   Injectable 5000 Unit(s) SubCutaneous every 8 hours  multivitamin 1 Tablet(s) Oral daily  tamsulosin 0.8 milliGRAM(s) Oral daily    MEDICATIONS  (PRN):  acetaminophen   Tablet .. 650 milliGRAM(s) Oral every 6 hours PRN Temp greater or equal to 38C (100.4F), Mild Pain (1 - 3)  ALBUTerol    90 MICROgram(s) HFA Inhaler 2 Puff(s) Inhalation every 6 hours PRN Shortness of Breath and/or Wheezing  LORazepam     Tablet 0.5 milliGRAM(s) Oral two times a day PRN Anxiety      RADIOLOGY & ADDITIONAL TESTS:

## 2021-08-26 NOTE — CONSULT NOTE ADULT - SUBJECTIVE AND OBJECTIVE BOX
Patient is a 76y old  Male who presents with a chief complaint of UTI (25 Aug 2021 14:06)      HPI:  77 y/o male with PMH of BPH on chronic reilly, pulmonary fibrosis on home oxygen 2L, HTN came to the ED complaining of palpitation and low grade fever x 2 days. Patient said his HR was 137 at home, was also having trouble breathing with minimal exertion, reported low grade fever 100 at home. He called his pulmonologist which suggested to come to the ED. Of note, he saw his urologist a week ago, reilly changed. GI consult requested for sudden onset N/V which started @ 0100 this AM w/o associated abdominal pain. CT scan today showed an abnormal cluster of non-dilated bowel loops in the RUQ with abnormal configuration at the ligament of Treitz suggestive of an internal hernia possibly right paraduodenal resulting in gastric outlet obstruction with some contrast passing into the small bowel. No prior h/o this happening. He states that he had a - EGD done for GERD roughly 10 years ago as well as a negative colonoscopy at that time. He was admitted for a UTI.      REVIEW OF SYSTEMS:  Constitutional: No fever, weight loss or fatigue  ENMT:  No difficulty hearing, tinnitus, vertigo; No sinus or throat pain  Respiratory: No cough, wheezing, chills or hemoptysis  Cardiovascular: No chest pain, palpitations, dizziness or leg swelling  Gastrointestinal: As per HPI.  Skin: No itching, burning, rashes or lesions   Musculoskeletal: No joint pain or swelling; No muscle, back or extremity pain.  Urological: As per HPI.  Patient has no cardiopulmonary, peripheral vascular, musculoskeletal, dermatological, neurological, or psychological symptoms or complaints at this time.    PAST MEDICAL & SURGICAL HISTORY:  HTN (hypertension)    BPH (benign prostatic hyperplasia)    Asthma    Dyspnea    Bronchiectasis    S/P appendectomy    History of incisional hernia repair    H/O reduction of nasal fracture  deviated septum    UTI    Pulmonary Fibrosis on Home oxygen as needed.      FAMILY HISTORY:  Family history of cerebrovascular accident (CVA) (Father)        SOCIAL HISTORY:  Smoking Status: [ ] Current, [ ] Former, [x ] Never  Pack Years: N/A. No ETOH or drug abuse history.    MEDICATIONS:  MEDICATIONS  (STANDING):  amLODIPine   Tablet 5 milliGRAM(s) Oral daily  aspirin  chewable 81 milliGRAM(s) Oral daily  cefTRIAXone   IVPB 1000 milliGRAM(s) IV Intermittent every 24 hours  cholecalciferol 1000 Unit(s) Oral daily  heparin   Injectable 5000 Unit(s) SubCutaneous every 8 hours  multivitamin 1 Tablet(s) Oral daily  tamsulosin 0.8 milliGRAM(s) Oral daily    MEDICATIONS  (PRN):  acetaminophen   Tablet .. 650 milliGRAM(s) Oral every 6 hours PRN Temp greater or equal to 38C (100.4F), Mild Pain (1 - 3)  ALBUTerol    90 MICROgram(s) HFA Inhaler 2 Puff(s) Inhalation every 6 hours PRN Shortness of Breath and/or Wheezing  LORazepam     Tablet 0.5 milliGRAM(s) Oral two times a day PRN Anxiety  ondansetron Injectable 4 milliGRAM(s) IV Push every 6 hours PRN Nausea and/or Vomiting      Allergies    No Known Allergies    Intolerances        Vital Signs Last 24 Hrs  T(C): 36.5 (26 Aug 2021 09:23), Max: 36.6 (26 Aug 2021 04:35)  T(F): 97.7 (26 Aug 2021 09:23), Max: 97.9 (26 Aug 2021 04:35)  HR: 80 (26 Aug 2021 09:23) (80 - 92)  BP: 125/73 (26 Aug 2021 09:23) (125/73 - 146/75)  BP(mean): --  RR: 18 (26 Aug 2021 09:23) (18 - 18)  SpO2: 96% (26 Aug 2021 09:23) (95% - 98%)    08-25 @ 07:01  -  08-26 @ 07:00  --------------------------------------------------------  IN: 0 mL / OUT: 1200 mL / NET: -1200 mL          PHYSICAL EXAM:    General: Well developed; nauseous when seen.  HEENT: MMM, conjunctiva pink and sclera anicteric.  Lungs: Clear bilaterally.  Cor: RRR S1, S2 only.  Gastrointestinal: Abdomen: Softy distended,  epigastric tenderness, Normal bowel sounds; No rebound or guarding or HSM.  ROBERT: Pt. refused.  Extremities: Normal range of motion, No clubbing, cyanosis or edema  Neurological: Alert and oriented x3  Skin: Warm and dry. No obvious rash      LABS:                        13.6   17.60 )-----------( 193      ( 26 Aug 2021 07:25 )             39.3     08-26    134<L>  |  94<L>  |  44.1<H>  ----------------------------<  146<H>  4.1   |  25.0  |  1.08    Ca    9.1      26 Aug 2021 07:25            RADIOLOGY & ADDITIONAL STUDIES:

## 2021-08-26 NOTE — CONSULT NOTE ADULT - SUBJECTIVE AND OBJECTIVE BOX
PULMONARY CONSULT NOTE      LILY [id number]    Patient is a 76y old  Male who presents with a chief complaint of UTI (26 Aug 2021 13:13)      INTERVAL HPI/OVERNIGHT EVENTS:Pt known to us with bx-proven UIP 2018 not on antifibrotic meds--refused.  On noct and ambulatory O2 at home.  Presented with abd complaints.  Now with gastric outlet obstruction. ?volvulus, paraduodenal hernia.  For urgent OR today.  Denies SOB over baseline until current episode.  PFT's with mild restriction, +diffusion defect.    MEDICATIONS  (STANDING):  amLODIPine   Tablet 5 milliGRAM(s) Oral daily  aspirin  chewable 81 milliGRAM(s) Oral daily  cefTRIAXone   IVPB 1000 milliGRAM(s) IV Intermittent every 24 hours  cholecalciferol 1000 Unit(s) Oral daily  heparin   Injectable 5000 Unit(s) SubCutaneous every 8 hours  lactated ringers. 1000 milliLiter(s) (100 mL/Hr) IV Continuous <Continuous>  multivitamin 1 Tablet(s) Oral daily  pantoprazole  Injectable 40 milliGRAM(s) IV Push daily  tamsulosin 0.8 milliGRAM(s) Oral daily      MEDICATIONS  (PRN):  acetaminophen   Tablet .. 650 milliGRAM(s) Oral every 6 hours PRN Temp greater or equal to 38C (100.4F), Mild Pain (1 - 3)  ALBUTerol    90 MICROgram(s) HFA Inhaler 2 Puff(s) Inhalation every 6 hours PRN Shortness of Breath and/or Wheezing  LORazepam   Injectable 0.5 milliGRAM(s) IV Push every 8 hours PRN Anxiety  ondansetron Injectable 4 milliGRAM(s) IV Push every 6 hours PRN Nausea and/or Vomiting      Allergies    No Known Allergies    Intolerances        PAST MEDICAL & SURGICAL HISTORY:  HTN (hypertension)    Anxiety    BPH (benign prostatic hyperplasia)    Asthma    Dyspnea    Bronchiectasis    S/P appendectomy    History of incisional hernia repair    H/O reduction of nasal fracture  deviated septum        FAMILY HISTORY:  Family history of cerebrovascular accident (CVA) (Father)        SOCIAL HISTORY  Smoking History:     REVIEW OF SYSTEMS:    CONSTITUTIONAL:  As per HPI.    HEENT:  Eyes:  No diplopia or blurred vision. ENT:  No earache, sore throat or runny nose.    CARDIOVASCULAR:  No pressure, squeezing, tightness, heaviness or aching about the chest; no palpitations.    RESPIRATORY:  Per HPI    GASTROINTESTINAL: per hpi    GENITOURINARY:  No dysuria, frequency or urgency.    MUSCULOSKELETAL:  No joint pains    SKIN:  No new lesions.    NEUROLOGIC:  No paresthesias, fasciculations, seizures or weakness.    PSYCHIATRIC:  No disorder of thought or mood.    ENDOCRINE:  No heat or cold intolerance, polyuria or polydipsia.    HEMATOLOGICAL:  No easy bruising or bleeding.     Vital Signs Last 24 Hrs  T(C): 36.5 (26 Aug 2021 09:23), Max: 36.6 (26 Aug 2021 04:35)  T(F): 97.7 (26 Aug 2021 09:23), Max: 97.9 (26 Aug 2021 04:35)  HR: 80 (26 Aug 2021 09:23) (80 - 92)  BP: 125/73 (26 Aug 2021 09:23) (125/73 - 146/75)  BP(mean): --  RR: 18 (26 Aug 2021 09:23) (18 - 18)  SpO2: 96% (26 Aug 2021 09:23) (95% - 98%)    PHYSICAL EXAMINATION:    GENERAL: The patient is a well-developed, well-nourished _____in no apparent distress.     HEENT: Head is normocephalic and atraumatic. Extraocular muscles are intact. Mucous membranes are moist.     NECK: Supple.     LUNGS:bilat crackles    HEART: Regular rate and rhythm without murmur.    ABDOMEN: distended.    EXTREMITIES: Without any cyanosis, clubbing, rash, lesions or edema.    NEUROLOGIC: Grossly intact.    SKIN: No ulceration or induration present.      LABS:                        13.6   17.60 )-----------( 193      ( 26 Aug 2021 07:25 )             39.3     08-26    134<L>  |  94<L>  |  44.1<H>  ----------------------------<  146<H>  4.1   |  25.0  |  1.08    Ca    9.1      26 Aug 2021 07:25    Blood Gas Profile - Venous (08.23.21 @ 17:20)    pH, Venous: 7.410: As of 6/9/2020 Reference Ranges have been amended for: KERRI, COHB, GLUWB,  HCO3, KWB, METHHB, NAWB, O2HB, PCO2.    pCO2, Venous: 38 mmHg    pO2, Venous: <42 mmHg    HCO3, Venous: 24 mmol/L    Base Excess, Venous: -0.5 mmol/L    Oxygen Saturation, Venous: 56.3 %                          MICROBIOLOGY:    RADIOLOGY & ADDITIONAL STUDIES:< from: CT Abdomen and Pelvis w/ Oral Cont (08.25.21 @ 17:58) >  *  Abnormal cluster of nondilated bowel loops in the right upper quadrant with abnormal configuration at the ligament of Treitz suggestive of an internal hernia, possibly right paraduodenal. This results in gastric outlet obstruction although some contrast passes into the small bowel.  *  Soft tissue at the urinary bladder base. Cannot exclude mass versus blood clot.  *  Mild bilateral hydroureteronephrosis    < end of copied text >  < from: CT Chest No Cont (07.08.21 @ 09:23) >c/w 1/20    Basilar predominant peripheral reticulations, traction bronchiectasis and honeycombing are stable from recent prior exams, increased from 2018.    < end of copied text >

## 2021-08-26 NOTE — CONSULT NOTE ADULT - SUBJECTIVE AND OBJECTIVE BOX
Consult Called: 12:40  Consult Seen: 08-26-21 @ 13:00    ACUTE CARE SURGERY CONSULT    HPI:  77 y/o male with PMH of BPH on chronic reilly, pulmonary fibrosis on home oxygen 2L, HTN came to the ED complaining of palpitation and low grade fever x 2 days. Patient said his HR was 137 at home, was also having trouble breathing with minimal exertion, reported low grade fever 100 at home. He called his pulmonologist which suggested to come to the ED. Of note, he saw his urologist a week ago, reilly changed. He has no nausea, vomiting, chest pain, abdominal pain, hematuria, diarrhea.  (23 Aug 2021 20:52)      PAST MEDICAL HISTORY:  HTN (hypertension)    Anxiety    BPH (benign prostatic hyperplasia)    Asthma    Dyspnea    Bronchiectasis        PAST SURGICAL HISTORY:  S/P appendectomy    History of incisional hernia repair    H/O reduction of nasal fracture        ALLERGIES:  No Known Allergies      FAMILY HISTORY:    SOCIAL HISTORY:    HOME MEDICATIONS:    MEDICATIONS  (STANDING):  amLODIPine   Tablet 5 milliGRAM(s) Oral daily  aspirin  chewable 81 milliGRAM(s) Oral daily  cefTRIAXone   IVPB 1000 milliGRAM(s) IV Intermittent every 24 hours  cholecalciferol 1000 Unit(s) Oral daily  heparin   Injectable 5000 Unit(s) SubCutaneous every 8 hours  multivitamin 1 Tablet(s) Oral daily  pantoprazole  Injectable 40 milliGRAM(s) IV Push daily  tamsulosin 0.8 milliGRAM(s) Oral daily    MEDICATIONS  (PRN):  acetaminophen   Tablet .. 650 milliGRAM(s) Oral every 6 hours PRN Temp greater or equal to 38C (100.4F), Mild Pain (1 - 3)  ALBUTerol    90 MICROgram(s) HFA Inhaler 2 Puff(s) Inhalation every 6 hours PRN Shortness of Breath and/or Wheezing  LORazepam     Tablet 0.5 milliGRAM(s) Oral two times a day PRN Anxiety  ondansetron Injectable 4 milliGRAM(s) IV Push every 6 hours PRN Nausea and/or Vomiting      VITALS & I/Os:  Vital Signs Last 24 Hrs  T(C): 36.5 (26 Aug 2021 09:23), Max: 36.6 (26 Aug 2021 04:35)  T(F): 97.7 (26 Aug 2021 09:23), Max: 97.9 (26 Aug 2021 04:35)  HR: 80 (26 Aug 2021 09:23) (80 - 92)  BP: 125/73 (26 Aug 2021 09:23) (125/73 - 146/75)  BP(mean): --  RR: 18 (26 Aug 2021 09:23) (18 - 18)  SpO2: 96% (26 Aug 2021 09:23) (95% - 98%)  CAPILLARY BLOOD GLUCOSE          I&O's Summary    25 Aug 2021 07:01  -  26 Aug 2021 07:00  --------------------------------------------------------  IN: 0 mL / OUT: 1200 mL / NET: -1200 mL        GENERAL: Alert, well developed, in no acute distress.  MENTAL STATUS: AAOx3. Appropriate affect.  HEENT: PERRLA. EOMI. MMM.  Trachea midline. No lymph node swelling or tenderness.  RESPIRATORY: CTAB. No wheezing, rales or rhonchi.  CARDIOVASCULAR: RRR. No audible murmurs, rubs or gallops.   GASTROINTESTINAL: Abdomen soft, NT, ND, -R/-G.  No pulsatile mass, no flank tenderness or suprapubic tenderness. No hepatosplenomegaly.  NEUROLOGIC: Cranial nerves II-XII grossly intact. No focal neurological deficits. Moves all extremities spontaneously. Sensation intact bilaterally.  INTGUMENTARY: No overt rashes or lesions, petechia or purpura. Good turgor. No edema.  MUSCULOSKELETAL: No cyanosis or clubbing. No gross deformities.   LYMPHATIC: Palpation of neck reveals no swelling or tenderness of neck nodes. Palpation of groin reveals no swelling or tenderness of groin nodes.    LABS:                        13.6   17.60 )-----------( 193      ( 26 Aug 2021 07:25 )             39.3     08-26    134<L>  |  94<L>  |  44.1<H>  ----------------------------<  146<H>  4.1   |  25.0  |  1.08    Ca    9.1      26 Aug 2021 07:25      Lactate: acetaminophen   Tablet .. 650 milliGRAM(s) Oral every 6 hours PRN  ALBUTerol    90 MICROgram(s) HFA Inhaler 2 Puff(s) Inhalation every 6 hours PRN  amLODIPine   Tablet 5 milliGRAM(s) Oral daily  aspirin  chewable 81 milliGRAM(s) Oral daily  cefTRIAXone   IVPB 1000 milliGRAM(s) IV Intermittent every 24 hours  cholecalciferol 1000 Unit(s) Oral daily  heparin   Injectable 5000 Unit(s) SubCutaneous every 8 hours  LORazepam     Tablet 0.5 milliGRAM(s) Oral two times a day PRN  multivitamin 1 Tablet(s) Oral daily  ondansetron Injectable 4 milliGRAM(s) IV Push every 6 hours PRN  pantoprazole  Injectable 40 milliGRAM(s) IV Push daily  tamsulosin 0.8 milliGRAM(s) Oral daily                   IMAGING:   Consult Called: 12:40  Consult Seen: 08-26-21 @ 13:00    ACUTE CARE SURGERY CONSULT    HPI:  75 y/o male with PMH of BPH on chronic reilly, pulmonary fibrosis on home oxygen 2L, HTN came to the ED complaining of palpitation and low grade fever x 2 days. Patient said his HR was 137 at home, was also having trouble breathing with minimal exertion, reported low grade fever 100 at home. He called his pulmonologist which suggested to come to the ED. Of note, he saw his urologist a week ago, reilly changed. He has no nausea, vomiting, chest pain, abdominal pain, hematuria, diarrhea.  (23 Aug 2021 20:52)    patient developed N/V 1 day ago, and CT a/p was obtained which revealed "* Abnormal cluster of nondilated bowel loops in the right upper quadrant with abnormal configuration at the ligament of Treitz suggestive of an internal hernia, possibly right paraduodenal. This results in gastric outlet obstruction although some contrast passes into the small bowel.  * Soft tissue at the urinary bladder base. Cannot exclude mass versus blood clot.  * Mild bilateral hydroureteronephrosis"    Surgery consulted for evaluation. Currently denies any abdominal pain, only some nausea. Denies abdominal pain like this before.     PAST MEDICAL HISTORY:  HTN (hypertension)    Anxiety    BPH (benign prostatic hyperplasia)    Asthma    Dyspnea    Bronchiectasis        PAST SURGICAL HISTORY:  S/P appendectomy    History of incisional hernia repair    H/O reduction of nasal fracture        ALLERGIES:  No Known Allergies      MEDICATIONS  (STANDING):  amLODIPine   Tablet 5 milliGRAM(s) Oral daily  aspirin  chewable 81 milliGRAM(s) Oral daily  cefTRIAXone   IVPB 1000 milliGRAM(s) IV Intermittent every 24 hours  cholecalciferol 1000 Unit(s) Oral daily  heparin   Injectable 5000 Unit(s) SubCutaneous every 8 hours  multivitamin 1 Tablet(s) Oral daily  pantoprazole  Injectable 40 milliGRAM(s) IV Push daily  tamsulosin 0.8 milliGRAM(s) Oral daily    MEDICATIONS  (PRN):  acetaminophen   Tablet .. 650 milliGRAM(s) Oral every 6 hours PRN Temp greater or equal to 38C (100.4F), Mild Pain (1 - 3)  ALBUTerol    90 MICROgram(s) HFA Inhaler 2 Puff(s) Inhalation every 6 hours PRN Shortness of Breath and/or Wheezing  LORazepam     Tablet 0.5 milliGRAM(s) Oral two times a day PRN Anxiety  ondansetron Injectable 4 milliGRAM(s) IV Push every 6 hours PRN Nausea and/or Vomiting      VITALS & I/Os:  Vital Signs Last 24 Hrs  T(C): 36.5 (26 Aug 2021 09:23), Max: 36.6 (26 Aug 2021 04:35)  T(F): 97.7 (26 Aug 2021 09:23), Max: 97.9 (26 Aug 2021 04:35)  HR: 80 (26 Aug 2021 09:23) (80 - 92)  BP: 125/73 (26 Aug 2021 09:23) (125/73 - 146/75)  BP(mean): --  RR: 18 (26 Aug 2021 09:23) (18 - 18)  SpO2: 96% (26 Aug 2021 09:23) (95% - 98%)  CAPILLARY BLOOD GLUCOSE          I&O's Summary    25 Aug 2021 07:01  -  26 Aug 2021 07:00  --------------------------------------------------------  IN: 0 mL / OUT: 1200 mL / NET: -1200 mL        Gen: alert, no acute distress  Pulm: on nasal cannula  CV: +s1/s2  GI: abd mildly distended, non-tender, well healed right paramedian incision    LABS:                        13.6   17.60 )-----------( 193      ( 26 Aug 2021 07:25 )             39.3     08-26    134<L>  |  94<L>  |  44.1<H>  ----------------------------<  146<H>  4.1   |  25.0  |  1.08    Ca    9.1      26 Aug 2021 07:25      Lactate: acetaminophen   Tablet .. 650 milliGRAM(s) Oral every 6 hours PRN  ALBUTerol    90 MICROgram(s) HFA Inhaler 2 Puff(s) Inhalation every 6 hours PRN  amLODIPine   Tablet 5 milliGRAM(s) Oral daily  aspirin  chewable 81 milliGRAM(s) Oral daily  cefTRIAXone   IVPB 1000 milliGRAM(s) IV Intermittent every 24 hours  cholecalciferol 1000 Unit(s) Oral daily  heparin   Injectable 5000 Unit(s) SubCutaneous every 8 hours  LORazepam     Tablet 0.5 milliGRAM(s) Oral two times a day PRN  multivitamin 1 Tablet(s) Oral daily  ondansetron Injectable 4 milliGRAM(s) IV Push every 6 hours PRN  pantoprazole  Injectable 40 milliGRAM(s) IV Push daily  tamsulosin 0.8 milliGRAM(s) Oral daily                   IMAGING:  CT a/p:  * Abnormal cluster of nondilated bowel loops in the right upper quadrant with abnormal configuration at the ligament of Treitz suggestive of an internal hernia, possibly right paraduodenal. This results in gastric outlet obstruction although some contrast passes into the small bowel.  * Soft tissue at the urinary bladder base. Cannot exclude mass versus blood clot.  * Mild bilateral hydroureteronephrosis Consult Called: 12:40  Consult Seen: 08-26-21 @ 13:00    ACUTE CARE SURGERY CONSULT    HPI:  75 y/o male with PMH of BPH on chronic reilly, pulmonary fibrosis on home oxygen 2L, HTN came to the ED complaining of palpitation and low grade fever x 2 days. Patient said his HR was 137 at home, was also having trouble breathing with minimal exertion, reported low grade fever 100 at home. He called his pulmonologist which suggested to come to the ED. Of note, he saw his urologist a week ago, reilly changed. He has no nausea, vomiting, chest pain, abdominal pain, hematuria, diarrhea.  (23 Aug 2021 20:52)    patient developed N/V 1 day ago, and CT a/p was obtained which revealed "* Abnormal cluster of nondilated bowel loops in the right upper quadrant with abnormal configuration at the ligament of Treitz suggestive of an internal hernia, possibly right paraduodenal. This results in gastric outlet obstruction although some contrast passes into the small bowel.  * Soft tissue at the urinary bladder base. Cannot exclude mass versus blood clot.  * Mild bilateral hydroureteronephrosis"    Surgery consulted for evaluation. Currently denies any abdominal pain, only some nausea. Denies abdominal pain like this before.     Colonoscopy: 5 years ago  EGD: 10 years ago, positive for H pylori s/p treatment    PAST MEDICAL HISTORY:  HTN (hypertension)    Anxiety    BPH (benign prostatic hyperplasia)    Asthma    Dyspnea    Bronchiectasis        PAST SURGICAL HISTORY:  S/P appendectomy    History of incisional hernia repair    H/O reduction of nasal fracture        ALLERGIES:  No Known Allergies      MEDICATIONS  (STANDING):  amLODIPine   Tablet 5 milliGRAM(s) Oral daily  aspirin  chewable 81 milliGRAM(s) Oral daily  cefTRIAXone   IVPB 1000 milliGRAM(s) IV Intermittent every 24 hours  cholecalciferol 1000 Unit(s) Oral daily  heparin   Injectable 5000 Unit(s) SubCutaneous every 8 hours  multivitamin 1 Tablet(s) Oral daily  pantoprazole  Injectable 40 milliGRAM(s) IV Push daily  tamsulosin 0.8 milliGRAM(s) Oral daily    MEDICATIONS  (PRN):  acetaminophen   Tablet .. 650 milliGRAM(s) Oral every 6 hours PRN Temp greater or equal to 38C (100.4F), Mild Pain (1 - 3)  ALBUTerol    90 MICROgram(s) HFA Inhaler 2 Puff(s) Inhalation every 6 hours PRN Shortness of Breath and/or Wheezing  LORazepam     Tablet 0.5 milliGRAM(s) Oral two times a day PRN Anxiety  ondansetron Injectable 4 milliGRAM(s) IV Push every 6 hours PRN Nausea and/or Vomiting      VITALS & I/Os:  Vital Signs Last 24 Hrs  T(C): 36.5 (26 Aug 2021 09:23), Max: 36.6 (26 Aug 2021 04:35)  T(F): 97.7 (26 Aug 2021 09:23), Max: 97.9 (26 Aug 2021 04:35)  HR: 80 (26 Aug 2021 09:23) (80 - 92)  BP: 125/73 (26 Aug 2021 09:23) (125/73 - 146/75)  BP(mean): --  RR: 18 (26 Aug 2021 09:23) (18 - 18)  SpO2: 96% (26 Aug 2021 09:23) (95% - 98%)  CAPILLARY BLOOD GLUCOSE          I&O's Summary    25 Aug 2021 07:01  -  26 Aug 2021 07:00  --------------------------------------------------------  IN: 0 mL / OUT: 1200 mL / NET: -1200 mL        Gen: alert, no acute distress  Pulm: on nasal cannula  CV: +s1/s2  GI: abd mildly distended, non-tender, well healed right paramedian incision    LABS:                        13.6   17.60 )-----------( 193      ( 26 Aug 2021 07:25 )             39.3     08-26    134<L>  |  94<L>  |  44.1<H>  ----------------------------<  146<H>  4.1   |  25.0  |  1.08    Ca    9.1      26 Aug 2021 07:25      Lactate: acetaminophen   Tablet .. 650 milliGRAM(s) Oral every 6 hours PRN  ALBUTerol    90 MICROgram(s) HFA Inhaler 2 Puff(s) Inhalation every 6 hours PRN  amLODIPine   Tablet 5 milliGRAM(s) Oral daily  aspirin  chewable 81 milliGRAM(s) Oral daily  cefTRIAXone   IVPB 1000 milliGRAM(s) IV Intermittent every 24 hours  cholecalciferol 1000 Unit(s) Oral daily  heparin   Injectable 5000 Unit(s) SubCutaneous every 8 hours  LORazepam     Tablet 0.5 milliGRAM(s) Oral two times a day PRN  multivitamin 1 Tablet(s) Oral daily  ondansetron Injectable 4 milliGRAM(s) IV Push every 6 hours PRN  pantoprazole  Injectable 40 milliGRAM(s) IV Push daily  tamsulosin 0.8 milliGRAM(s) Oral daily                   IMAGING:  CT a/p:  * Abnormal cluster of nondilated bowel loops in the right upper quadrant with abnormal configuration at the ligament of Treitz suggestive of an internal hernia, possibly right paraduodenal. This results in gastric outlet obstruction although some contrast passes into the small bowel.  * Soft tissue at the urinary bladder base. Cannot exclude mass versus blood clot.  * Mild bilateral hydroureteronephrosis

## 2021-08-26 NOTE — CONSULT NOTE ADULT - ASSESSMENT
Imp--UIP with stable radiographic parameters, mild restriction, diffusing defect. Now preop for urgent abd surgery.  Recent increased SOB due to superimposed gastric distention.  Venous BG shows no CO2 retention.  Plan--Pt cleared for surgery from pulm POV.  May require increased O2 postop due to distention, ileus,etc.

## 2021-08-26 NOTE — CONSULT NOTE ADULT - ASSESSMENT
76M admitted to medicine for urosepsis and now GOO likely 2/2 internal hernia    -NGT decompression  -pre-op labs  -add-on for OR today ex-lap, reduction of internal hernia, possible small bowel resection and any other indicated procedures 76M admitted to medicine for urosepsis and now GOO likely 2/2 internal hernia    -NGT decompression-3L upon insertion and turning on suction  -pre-op labs  -add-on for OR today ex-lap, reduction of internal hernia, possible small bowel resection and any other indicated procedures

## 2021-08-26 NOTE — DIETITIAN INITIAL EVALUATION ADULT. - ETIOLOGY
related to inability to meet sufficient protein-energy needs 2/2 pulmonary fibrosis , sepsis 2/2 UTI

## 2021-08-26 NOTE — DIETITIAN INITIAL EVALUATION ADULT. - ORAL INTAKE PTA/DIET HISTORY
Pt reports UBW 250lbs x 1 year ago with ~50lb subsequent weight loss (Current weight 195lbs). Pt admits to N/V x 2 days though states to RD forced vomiting due to persistent nausea. Pt states tolerating toast this morning. Discussed importance of adequate protein intake to optimize nutrition status.

## 2021-08-26 NOTE — CHART NOTE - NSCHARTNOTEFT_GEN_A_CORE
INTERVAL HPI/OVERNIGHT EVENTS:    Patient evaluated at bedside. No acute distress. No acute events since procedure. Pain is controlled.  No signs of bowel activity as yet.  Only pulling 250 on IS.      MEDICATIONS  (STANDING):  amLODIPine   Tablet 5 milliGRAM(s) Oral daily  aspirin  chewable 81 milliGRAM(s) Oral daily  cefTRIAXone   IVPB 1000 milliGRAM(s) IV Intermittent every 24 hours  cholecalciferol 1000 Unit(s) Oral daily  heparin   Injectable 5000 Unit(s) SubCutaneous every 8 hours  lactated ringers. 1000 milliLiter(s) (100 mL/Hr) IV Continuous <Continuous>  multivitamin 1 Tablet(s) Oral daily  pantoprazole  Injectable 40 milliGRAM(s) IV Push daily  tamsulosin 0.8 milliGRAM(s) Oral daily    MEDICATIONS  (PRN):  acetaminophen   Tablet .. 650 milliGRAM(s) Oral every 6 hours PRN Temp greater or equal to 38C (100.4F), Mild Pain (1 - 3)  ALBUTerol    90 MICROgram(s) HFA Inhaler 2 Puff(s) Inhalation every 6 hours PRN Shortness of Breath and/or Wheezing  HYDROmorphone  Injectable 0.5 milliGRAM(s) IV Push every 4 hours PRN Severe Pain (7 - 10)  ketorolac   Injectable 15 milliGRAM(s) IV Push every 6 hours PRN Moderate Pain (4 - 6)  LORazepam   Injectable 0.5 milliGRAM(s) IV Push every 8 hours PRN Anxiety  ondansetron Injectable 4 milliGRAM(s) IV Push every 6 hours PRN Nausea and/or Vomiting      Vital Signs Last 24 Hrs  T(C): 36.4 (26 Aug 2021 19:27), Max: 36.6 (26 Aug 2021 04:35)  T(F): 97.6 (26 Aug 2021 19:27), Max: 97.9 (26 Aug 2021 04:35)  HR: 76 (26 Aug 2021 19:27) (70 - 92)  BP: 118/72 (26 Aug 2021 19:27) (110/52 - 146/75)  BP(mean): 80 (26 Aug 2021 19:00) (62 - 80)  RR: 18 (26 Aug 2021 19:27) (14 - 21)  SpO2: 96% (26 Aug 2021 19:27) (95% - 100%)    Constitutional: NAD  HEENT: PERRLA, EOMI, no drainage or redness  Neck: No bruits; no thyromegaly or nodules,  No JVD  Back: Normal spine flexure, No CVA tenderness, No deformity or limitation of movement  Respiratory: Breath Sounds equal & clear to percussion & auscultation, no accessory muscle use  Cardiovascular: Regular rate & rhythm, normal S1, S2; no murmurs, gallops or rubs; no S3, S4  Gastrointestinal: Soft, tender along prevena dressing, no rebound or guarding, no hepatosplenomegaly, normal bowel sounds  Extremities: No peripheral edema, No cyanosis, clubbing   Vascular: Equal and normal pulses: 2+ peripheral pulses throughout  Neurological: A&O x 3; no sensory, motor or coordination deficits, normal reflexes  Psychiatric: Normal mood, normal affect  Musculoskeletal: No joint pain, swelling or deformity; no limitation of movement  Skin: No rashes      I&O's Detail    25 Aug 2021 07:01  -  26 Aug 2021 07:00  --------------------------------------------------------  IN:  Total IN: 0 mL    OUT:    Voided (mL): 1200 mL  Total OUT: 1200 mL    Total NET: -1200 mL      26 Aug 2021 07:01  -  26 Aug 2021 22:20  --------------------------------------------------------  IN:  Total IN: 0 mL    OUT:    Nasogastric/Oral tube (mL): 3400 mL  Total OUT: 3400 mL    Total NET: -3400 mL          LABS:                        13.6   17.60 )-----------( 193      ( 26 Aug 2021 07:25 )             39.3     08-26    134<L>  |  94<L>  |  44.1<H>  ----------------------------<  146<H>  4.1   |  25.0  |  1.08    Ca    9.1      26 Aug 2021 07:25  Phos  4.1     08-26  Mg     2.3     08-26      PT/INR - ( 26 Aug 2021 16:01 )   PT: 14.3 sec;   INR: 1.25 ratio         PTT - ( 26 Aug 2021 16:01 )  PTT:28.7 sec      RADIOLOGY & ADDITIONAL STUDIES:    77 y/o male with PMH of BPH on chronic reilly, pulmonary fibrosis on home oxygen 2L, HTN came to the ED complaining of palpitation and low grade fever x 2 days. 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    - Pain control  - HD monitoring  - Pulm toilet - only pulling 250 on IS, advised to keep working with it  - AROBF, mIVF, NPO  - Reilly  - DVT ppx

## 2021-08-26 NOTE — BRIEF OPERATIVE NOTE - OPERATION/FINDINGS
exploratory laparotomy with reduction of internal hernia  all small bowel viable  multiple small bowel diverticulum  closure of mesenteric defect  closure of fascia exploratory laparotomy with reduction of internal hernia  nasogastric tube palpated in adequate position in stomach  all small bowel viable  multiple small bowel diverticulum  closure of mesenteric defect  closure of fascia

## 2021-08-26 NOTE — DIETITIAN INITIAL EVALUATION ADULT. - PERTINENT LABORATORY DATA
08-26 Na134 mmol/L<L> Glu 146 mg/dL<H> K+ 4.1 mmol/L Cr  1.08 mg/dL BUN 44.1 mg/dL<H> Phos n/a   Alb n/a   PAB n/a

## 2021-08-26 NOTE — DIETITIAN INITIAL EVALUATION ADULT. - OTHER INFO
75 y/o male with PMH of BPH on chronic reilly, pulmonary fibrosis on home oxygen 2L, HTN came to the ED complaining of palpitation and low grade fever x 2 days. Pt was also having trouble breathing with minimal exertion, reported low grade fever at home. Of note, he saw his urologist a week ago, reilly changed. He has no nausea, vomiting, chest pain, abdominal pain, hematuria, diarrhea. In the ED, patient with sepsis due to UTI, reilly changed.

## 2021-08-26 NOTE — PROGRESS NOTE ADULT - ASSESSMENT
77 y/o male with PMH of BPH on chronic reilly, pulmonary fibrosis on home oxygen 2L, HTN came to the ED complaining of palpitation and low grade fever x 2 days. Patient said his HR was 137 at home, was also having trouble breathing with minimal exertion, reported low grade fever 100 at home. He called his pulmonologist which suggested to come to the ED. Of note, he saw his urologist a week ago, reilly changed. He has no nausea, vomiting, chest pain, abdominal pain, hematuria, diarrhea.   In the ED, patient with sepsis due to UTI, reilly changed.     #UTI 2/2 reilly catheter; sepsis present on admission now resolved  - UA noted   - follow up with urine culture  - Continue antibiotic for now  - reilly replaced on 8/23 - chronic in nature    #partial gastric outlet obstruction - abdomen/pelvis CT shows possible internal hernia - still with bilious vomiting  - GI consult appreciate  - will place NGT  - will consult surgery    #BRITNEY   - Likely due to sepsis/UTI   - Cr: 1.37 (baseline 1.05)   - IVF given - will continue  - Avoid nephrotoxic agent     #HTN  - Hold Lisinopril 20mg given BRITNEY; resume as needed  - Amlodipine 5mg   - Monitor BP     #Pulmonary fibrosis - CXR appears unchanged  - Oxygen therapy   - Albuterol     #BPH with reilly for last year; replaced on 8/23  - continue with reilly  - c/w Flomax 0.8mg     #Anxiety   - c/w Xanax 0.5mg bid PRN     Supportive   DVT prophylaxis: Heparin sc   Diet DASH     Plan of care discussed with patient, ex-wife and GF 77 y/o male with PMH of BPH on chronic reilly, pulmonary fibrosis on home oxygen 2L, HTN came to the ED complaining of palpitation and low grade fever x 2 days. Patient said his HR was 137 at home, was also having trouble breathing with minimal exertion, reported low grade fever 100 at home. He called his pulmonologist which suggested to come to the ED. Of note, he saw his urologist a week ago, reilly changed. He has no nausea, vomiting, chest pain, abdominal pain, hematuria, diarrhea.   In the ED, patient with sepsis due to UTI, reilly changed.     #UTI 2/2 reilly catheter; sepsis present on admission now resolved  - UA noted   - follow up with urine culture  - Continue antibiotic for now  - reilly replaced on 8/23 - chronic in nature    #partial gastric outlet obstruction - abdomen/pelvis CT shows possible internal hernia - still with bilious vomiting  - GI consult appreciate  - will place NGT to intermittent suction  - will consult surgery  - strict NPO for now    #BRITNEY   - Likely due to sepsis/UTI   - Cr: 1.37 (baseline 1.05)   - IVF given - will continue  - Avoid nephrotoxic agent     #HTN  - Hold Lisinopril 20mg given BRITNEY; resume as needed  - Amlodipine 5mg   - Monitor BP     #Pulmonary fibrosis - CXR appears unchanged  - Oxygen therapy   - Albuterol     #BPH with reilly for last year; replaced on 8/23  - continue with reilly  - c/w Flomax 0.8mg     #Anxiety   - c/w Xanax 0.5mg bid PRN     Supportive   DVT prophylaxis: Heparin sc   Diet DASH     Plan of care discussed with patient, ex-wife and GF

## 2021-08-26 NOTE — DIETITIAN NUTRITION RISK NOTIFICATION - ADDITIONAL COMMENTS/DIETITIAN RECOMMENDATIONS
1) Aware since assessment Pt made NPO for NGT suction 2/2 GOO  2) When medically feasible recommend CLD   3) Rx vit C 500mg BID daily   4) Monitor weights daily for trend/accuracy

## 2021-08-26 NOTE — CONSULT NOTE ADULT - ATTENDING COMMENTS
The patient has a gastric outlet obstruction of unknown etiology possibly a paraduodenal hernia. The patient's abdomen is firm in the LUQ and has an ng in place that is draining gastric contents. I have discussed the need for surgery with the patient and have spoken with his pulmonary physician regarding his need for surgery who concurs with our plan. The OR has been notified and our status currently pending. He is a higher than normal risk  for surgery which he understands and accepts. He has stated that he wishes in the event of an untoward event he does not want to  be resuscitated.

## 2021-08-26 NOTE — BRIEF OPERATIVE NOTE - NSICDXBRIEFPROCEDURE_GEN_ALL_CORE_FT
PROCEDURES:  Exploratory laparotomy 26-Aug-2021 18:20:53  Parker Tai  Reduction, hernia, internal 26-Aug-2021 18:21:08  Parker Tai

## 2021-08-26 NOTE — CONSULT NOTE ADULT - ASSESSMENT
Nausea and vomiting with abnormal CT scan suggesting internal hernia in paraduodenal with external compression of the stomach causing gastric outlet obstruction. Recommend NGT to L[facility] and Surgical consult as well as Pulmonary consult for Pulmonary Fibrosis. Keep NPO. IV Pantoprazole for GI mucosal cytoprotection. The above recommendations were d/w Dr. Bravo. Nausea and vomiting with abnormal CT scan suggesting internal hernia in paraduodenal with external compression of the stomach causing gastric outlet obstruction. Recommend NGT to LIS and Surgical consult as well as Pulmonary consult for Pulmonary Fibrosis. Keep NPO. IV Pantoprazole for GI mucosal cytoprotection. The above recommendations were d/w Dr. Bravo. No plans or need for EGD or further GI testing or work-up. Signing off. Reconsult as needed. Thank you.

## 2021-08-27 LAB
ANION GAP SERPL CALC-SCNC: 11 MMOL/L — SIGNIFICANT CHANGE UP (ref 5–17)
BASOPHILS # BLD AUTO: 0.03 K/UL — SIGNIFICANT CHANGE UP (ref 0–0.2)
BASOPHILS NFR BLD AUTO: 0.2 % — SIGNIFICANT CHANGE UP (ref 0–2)
BUN SERPL-MCNC: 57.6 MG/DL — HIGH (ref 8–20)
CALCIUM SERPL-MCNC: 8.7 MG/DL — SIGNIFICANT CHANGE UP (ref 8.6–10.2)
CHLORIDE SERPL-SCNC: 97 MMOL/L — LOW (ref 98–107)
CO2 SERPL-SCNC: 28 MMOL/L — SIGNIFICANT CHANGE UP (ref 22–29)
CREAT SERPL-MCNC: 1.46 MG/DL — HIGH (ref 0.5–1.3)
EOSINOPHIL # BLD AUTO: 0.01 K/UL — SIGNIFICANT CHANGE UP (ref 0–0.5)
EOSINOPHIL NFR BLD AUTO: 0.1 % — SIGNIFICANT CHANGE UP (ref 0–6)
GLUCOSE SERPL-MCNC: 153 MG/DL — HIGH (ref 70–99)
HCT VFR BLD CALC: 44.9 % — SIGNIFICANT CHANGE UP (ref 39–50)
HGB BLD-MCNC: 15.4 G/DL — SIGNIFICANT CHANGE UP (ref 13–17)
IMM GRANULOCYTES NFR BLD AUTO: 0.5 % — SIGNIFICANT CHANGE UP (ref 0–1.5)
LYMPHOCYTES # BLD AUTO: 1.19 K/UL — SIGNIFICANT CHANGE UP (ref 1–3.3)
LYMPHOCYTES # BLD AUTO: 8.3 % — LOW (ref 13–44)
MAGNESIUM SERPL-MCNC: 2.5 MG/DL — SIGNIFICANT CHANGE UP (ref 1.6–2.6)
MCHC RBC-ENTMCNC: 31.3 PG — SIGNIFICANT CHANGE UP (ref 27–34)
MCHC RBC-ENTMCNC: 34.3 GM/DL — SIGNIFICANT CHANGE UP (ref 32–36)
MCV RBC AUTO: 91.3 FL — SIGNIFICANT CHANGE UP (ref 80–100)
MONOCYTES # BLD AUTO: 1.07 K/UL — HIGH (ref 0–0.9)
MONOCYTES NFR BLD AUTO: 7.5 % — SIGNIFICANT CHANGE UP (ref 2–14)
NEUTROPHILS # BLD AUTO: 11.97 K/UL — HIGH (ref 1.8–7.4)
NEUTROPHILS NFR BLD AUTO: 83.4 % — HIGH (ref 43–77)
PHOSPHATE SERPL-MCNC: 3.4 MG/DL — SIGNIFICANT CHANGE UP (ref 2.4–4.7)
PLATELET # BLD AUTO: 237 K/UL — SIGNIFICANT CHANGE UP (ref 150–400)
POTASSIUM SERPL-MCNC: 4.5 MMOL/L — SIGNIFICANT CHANGE UP (ref 3.5–5.3)
POTASSIUM SERPL-SCNC: 4.5 MMOL/L — SIGNIFICANT CHANGE UP (ref 3.5–5.3)
RBC # BLD: 4.92 M/UL — SIGNIFICANT CHANGE UP (ref 4.2–5.8)
RBC # FLD: 12.8 % — SIGNIFICANT CHANGE UP (ref 10.3–14.5)
SODIUM SERPL-SCNC: 136 MMOL/L — SIGNIFICANT CHANGE UP (ref 135–145)
WBC # BLD: 14.34 K/UL — HIGH (ref 3.8–10.5)
WBC # FLD AUTO: 14.34 K/UL — HIGH (ref 3.8–10.5)

## 2021-08-27 PROCEDURE — 99232 SBSQ HOSP IP/OBS MODERATE 35: CPT

## 2021-08-27 RX ORDER — SODIUM CHLORIDE 9 MG/ML
500 INJECTION INTRAMUSCULAR; INTRAVENOUS; SUBCUTANEOUS ONCE
Refills: 0 | Status: COMPLETED | OUTPATIENT
Start: 2021-08-27 | End: 2021-08-27

## 2021-08-27 RX ADMIN — AMLODIPINE BESYLATE 5 MILLIGRAM(S): 2.5 TABLET ORAL at 12:10

## 2021-08-27 RX ADMIN — HEPARIN SODIUM 5000 UNIT(S): 5000 INJECTION INTRAVENOUS; SUBCUTANEOUS at 06:49

## 2021-08-27 RX ADMIN — TAMSULOSIN HYDROCHLORIDE 0.8 MILLIGRAM(S): 0.4 CAPSULE ORAL at 12:10

## 2021-08-27 RX ADMIN — Medication 1 TABLET(S): at 12:16

## 2021-08-27 RX ADMIN — SODIUM CHLORIDE 100 MILLILITER(S): 9 INJECTION, SOLUTION INTRAVENOUS at 20:42

## 2021-08-27 RX ADMIN — HEPARIN SODIUM 5000 UNIT(S): 5000 INJECTION INTRAVENOUS; SUBCUTANEOUS at 14:31

## 2021-08-27 RX ADMIN — CEFTRIAXONE 100 MILLIGRAM(S): 500 INJECTION, POWDER, FOR SOLUTION INTRAMUSCULAR; INTRAVENOUS at 00:10

## 2021-08-27 RX ADMIN — Medication 0.5 MILLIGRAM(S): at 00:50

## 2021-08-27 RX ADMIN — Medication 15 MILLIGRAM(S): at 07:53

## 2021-08-27 RX ADMIN — HEPARIN SODIUM 5000 UNIT(S): 5000 INJECTION INTRAVENOUS; SUBCUTANEOUS at 00:10

## 2021-08-27 RX ADMIN — Medication 15 MILLIGRAM(S): at 08:31

## 2021-08-27 RX ADMIN — CEFTRIAXONE 100 MILLIGRAM(S): 500 INJECTION, POWDER, FOR SOLUTION INTRAMUSCULAR; INTRAVENOUS at 22:39

## 2021-08-27 RX ADMIN — Medication 81 MILLIGRAM(S): at 12:10

## 2021-08-27 RX ADMIN — PANTOPRAZOLE SODIUM 40 MILLIGRAM(S): 20 TABLET, DELAYED RELEASE ORAL at 12:17

## 2021-08-27 RX ADMIN — Medication 1000 UNIT(S): at 12:10

## 2021-08-27 RX ADMIN — SODIUM CHLORIDE 500 MILLILITER(S): 9 INJECTION INTRAMUSCULAR; INTRAVENOUS; SUBCUTANEOUS at 06:51

## 2021-08-27 RX ADMIN — Medication 0.5 MILLIGRAM(S): at 12:16

## 2021-08-27 RX ADMIN — HEPARIN SODIUM 5000 UNIT(S): 5000 INJECTION INTRAVENOUS; SUBCUTANEOUS at 22:41

## 2021-08-27 NOTE — PROGRESS NOTE ADULT - SUBJECTIVE AND OBJECTIVE BOX
INTERVAL HPI/OVERNIGHT EVENTS:    Patient evaluated at bedside. No acute distress. No acute events overnight.  Underwent laparotomy and reduction of a paraduodenal hernia.  No nausea or vomiting post procedure.  Post op check was benign.  Pulling 250 on IS.      MEDICATIONS  (STANDING):  amLODIPine   Tablet 5 milliGRAM(s) Oral daily  aspirin  chewable 81 milliGRAM(s) Oral daily  cefTRIAXone   IVPB 1000 milliGRAM(s) IV Intermittent every 24 hours  cholecalciferol 1000 Unit(s) Oral daily  heparin   Injectable 5000 Unit(s) SubCutaneous every 8 hours  lactated ringers. 1000 milliLiter(s) (100 mL/Hr) IV Continuous <Continuous>  multivitamin 1 Tablet(s) Oral daily  pantoprazole  Injectable 40 milliGRAM(s) IV Push daily  tamsulosin 0.8 milliGRAM(s) Oral daily    MEDICATIONS  (PRN):  acetaminophen   Tablet .. 650 milliGRAM(s) Oral every 6 hours PRN Temp greater or equal to 38C (100.4F), Mild Pain (1 - 3)  ALBUTerol    90 MICROgram(s) HFA Inhaler 2 Puff(s) Inhalation every 6 hours PRN Shortness of Breath and/or Wheezing  HYDROmorphone  Injectable 0.5 milliGRAM(s) IV Push every 4 hours PRN Severe Pain (7 - 10)  ketorolac   Injectable 15 milliGRAM(s) IV Push every 6 hours PRN Moderate Pain (4 - 6)  LORazepam   Injectable 0.5 milliGRAM(s) IV Push every 8 hours PRN Anxiety  ondansetron Injectable 4 milliGRAM(s) IV Push every 6 hours PRN Nausea and/or Vomiting      Vital Signs Last 24 Hrs  T(C): 36.4 (26 Aug 2021 19:27), Max: 36.6 (26 Aug 2021 04:35)  T(F): 97.6 (26 Aug 2021 19:27), Max: 97.9 (26 Aug 2021 04:35)  HR: 76 (26 Aug 2021 19:27) (70 - 92)  BP: 118/72 (26 Aug 2021 19:27) (110/52 - 136/72)  BP(mean): 80 (26 Aug 2021 19:00) (62 - 80)  RR: 18 (26 Aug 2021 19:27) (14 - 21)  SpO2: 96% (26 Aug 2021 19:27) (95% - 100%)    Constitutional: NAD  HEENT: PERRLA, EOMI, no drainage or redness  Neck: No bruits; no thyromegaly or nodules,  No JVD  Back: Normal spine flexure, No CVA tenderness, No deformity or limitation of movement  Respiratory: Breath Sounds equal & clear to percussion & auscultation, no accessory muscle use  Cardiovascular: Regular rate & rhythm, normal S1, S2; no murmurs, gallops or rubs; no S3, S4  Gastrointestinal: Soft, tender along prevena dressing, no rebound or guarding, no hepatosplenomegaly, normal bowel sounds  Extremities: No peripheral edema, No cyanosis, clubbing   Vascular: Equal and normal pulses: 2+ peripheral pulses throughout  Neurological: A&O x 3; no sensory, motor or coordination deficits, normal reflexes  Psychiatric: Normal mood, normal affect  Musculoskeletal: No joint pain, swelling or deformity; no limitation of movement  Skin: No rashes      I&O's Detail    25 Aug 2021 07:01  -  26 Aug 2021 07:00  --------------------------------------------------------  IN:  Total IN: 0 mL    OUT:    Voided (mL): 1200 mL  Total OUT: 1200 mL    Total NET: -1200 mL      26 Aug 2021 07:01  -  27 Aug 2021 01:31  --------------------------------------------------------  IN:  Total IN: 0 mL    OUT:    Nasogastric/Oral tube (mL): 3400 mL  Total OUT: 3400 mL    Total NET: -3400 mL          LABS:                        13.6   17.60 )-----------( 193      ( 26 Aug 2021 07:25 )             39.3     08-26    134<L>  |  94<L>  |  44.1<H>  ----------------------------<  146<H>  4.1   |  25.0  |  1.08    Ca    9.1      26 Aug 2021 07:25  Phos  4.1     08-26  Mg     2.3     08-26      PT/INR - ( 26 Aug 2021 16:01 )   PT: 14.3 sec;   INR: 1.25 ratio         PTT - ( 26 Aug 2021 16:01 )  PTT:28.7 sec      RADIOLOGY & ADDITIONAL STUDIES:

## 2021-08-27 NOTE — PROGRESS NOTE ADULT - ASSESSMENT
75 y/o male with PMH of BPH on chronic reilly, pulmonary fibrosis on home oxygen 2L, HTN came to the ED complaining of palpitation and low grade fever x 2 days. 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    - Pain control  - HD monitoring  - Pulm toilet - only pulling 250 on IS, advised to keep working with it  - AROBF, mIVF, NPO  - Reilly  - DVT ppx.

## 2021-08-27 NOTE — PROGRESS NOTE ADULT - SUBJECTIVE AND OBJECTIVE BOX
PULMONARY PROGRESS NOTE      [id number]    Patient is a 76y old  Male who presents with a chief complaint of UTI (27 Aug 2021 01:31)  Pt known to us (Dr Mg) with bx-proven UIP 2018 not on antifibrotic meds--refused.  On noct and ambulatory O2 at home.  Presented with fever and tachycardia - initially felt to be urosepsis (chronic indwelling reilly for BPH) -   Now S/P laparotomy and reduction of a paraduodenal hernia.    INTERVAL HPI/OVERNIGHT EVENTS:  Doing well  Previously told unsafe to have TURP for BPH - now thinks he can tolerate it (Will speak to Dr Jarvis (urologist) and Dr Mg (pulmonary)    MEDICATIONS  (STANDING):  amLODIPine   Tablet 5 milliGRAM(s) Oral daily  aspirin  chewable 81 milliGRAM(s) Oral daily  cefTRIAXone   IVPB 1000 milliGRAM(s) IV Intermittent every 24 hours  cholecalciferol 1000 Unit(s) Oral daily  heparin   Injectable 5000 Unit(s) SubCutaneous every 8 hours  lactated ringers. 1000 milliLiter(s) (100 mL/Hr) IV Continuous <Continuous>  multivitamin 1 Tablet(s) Oral daily  pantoprazole  Injectable 40 milliGRAM(s) IV Push daily  tamsulosin 0.8 milliGRAM(s) Oral daily      MEDICATIONS  (PRN):  acetaminophen   Tablet .. 650 milliGRAM(s) Oral every 6 hours PRN Temp greater or equal to 38C (100.4F), Mild Pain (1 - 3)  ALBUTerol    90 MICROgram(s) HFA Inhaler 2 Puff(s) Inhalation every 6 hours PRN Shortness of Breath and/or Wheezing  HYDROmorphone  Injectable 0.5 milliGRAM(s) IV Push every 4 hours PRN Severe Pain (7 - 10)  ketorolac   Injectable 15 milliGRAM(s) IV Push every 6 hours PRN Moderate Pain (4 - 6)  LORazepam   Injectable 0.5 milliGRAM(s) IV Push every 8 hours PRN Anxiety  ondansetron Injectable 4 milliGRAM(s) IV Push every 6 hours PRN Nausea and/or Vomiting      Allergies    No Known Allergies    Intolerances        PAST MEDICAL & SURGICAL HISTORY:  HTN (hypertension)    Anxiety    BPH (benign prostatic hyperplasia)    Asthma    Dyspnea    Bronchiectasis    S/P appendectomy    History of incisional hernia repair    H/O reduction of nasal fracture  deviated septum        SOCIAL HISTORY  Smoking History:       REVIEW OF SYSTEMS:    CONSTITUTIONAL:  No distress    HEENT:  Eyes:  No diplopia or blurred vision. ENT:  No earache, sore throat or runny nose.    CARDIOVASCULAR:  No pressure, squeezing, tightness, heaviness or aching about the chest; no palpitations.    RESPIRATORY:  No cough, shortness of breath, PND or orthopnea. Mild SOBOE    GASTROINTESTINAL:  No nausea, vomiting or diarrhea.    GENITOURINARY:  No dysuria, frequency or urgency.    NEUROLOGIC:  No paresthesias, fasciculations, seizures or weakness.    PSYCHIATRIC:  No disorder of thought or mood.    Vital Signs Last 24 Hrs  T(C): 36.5 (27 Aug 2021 05:01), Max: 36.5 (26 Aug 2021 16:10)  T(F): 97.7 (27 Aug 2021 05:01), Max: 97.7 (26 Aug 2021 16:10)  HR: 71 (27 Aug 2021 05:01) (70 - 87)  BP: 100/65 (27 Aug 2021 05:01) (100/65 - 128/56)  BP(mean): 80 (26 Aug 2021 19:00) (62 - 80)  RR: 18 (27 Aug 2021 05:01) (14 - 21)  SpO2: 98% (27 Aug 2021 05:01) (96% - 100%)    PHYSICAL EXAMINATION:    GENERAL: The patient is awake and alert in no apparent distress.     HEENT: Head is normocephalic and atraumatic. Extraocular muscles are intact. Mucous membranes are moist.    NECK: Supple.    LUNGS: Clear to auscultation without wheezing, rales or rhonchi; respirations unlabored    HEART: Regular rate and rhythm without murmur.    ABDOMEN: Soft, nontender, and nondistended.      EXTREMITIES: Without any cyanosis, clubbing, rash, lesions or edema.    NEUROLOGIC: Grossly intact.    LABS:                        15.4   14.34 )-----------( 237      ( 27 Aug 2021 07:08 )             44.9     08-27    136  |  97<L>  |  57.6<H>  ----------------------------<  153<H>  4.5   |  28.0  |  1.46<H>    Ca    8.7      27 Aug 2021 07:08  Phos  3.4     08-27  Mg     2.5     08-27      PT/INR - ( 26 Aug 2021 16:01 )   PT: 14.3 sec;   INR: 1.25 ratio         PTT - ( 26 Aug 2021 16:01 )  PTT:28.7 sec      MICROBIOLOGY: COVID PCR neg; Tristen>250: Urine + Klebs    RADIOLOGY & ADDITIONAL STUDIES:    CT Abd on 8/25/21  IMPRESSION:  *  Abnormal cluster of nondilated bowel loops in the right upper quadrant with abnormal configuration at the ligament of Treitz suggestive of an internal hernia, possibly right paraduodenal. This results in gastric outlet obstruction although some contrast passes into the small bowel.  *  Soft tissue at the urinary bladder base. Cannot exclude mass versus blood clot.  *  Mild bilateral hydroureteronephrosis  RUFINO DINH MD; Attending Radiologist  This document has been electronically signed. Aug 25 2021  8:05PM       EXAM:  XR CHEST PORTABLE URGENT 1V                          PROCEDURE DATE:  08/26/2021      INTERPRETATION:  CLINICAL INDICATION: 76 years  Male with confirm ngt.    COMPARISON: 8/23/2021    The enteric tube extends below the diaphragm. The tip is not included in the image.    AP view of the chest again demonstrates diffuse bilateral interstitial infiltrates. There is a trace right pleural effusion and no left pleural effusion. There is no pneumothorax.    The heart is normal in size. There is no mediastinal or hilar mass.    The pulmonary vasculature is normal.    Mild thoracic degenerative changes are present.    IMPRESSION:    Enteric catheter in satisfactory position.    Diffuse bilateral interstitial infiltrates likely chronic.    BINTA THAPA MD; Attending Radiologist  This document has been electronically signed. Aug 26 2021  5:01PM

## 2021-08-27 NOTE — PROGRESS NOTE ADULT - ASSESSMENT
Assess--  UIP with stable radiographic parameters, mild restriction, diffusing defect. At baseline  Recent increased SOB due to superimposed gastric distention.  Venous BG shows no CO2 retention.  BPH with Chronic Ortiz complicated by UTI  POD 0 mini-laparotomy and reduction of hernia with closure of the defect    Rec--  02  Pain control  Incentive Spirometry  Mobilize  Complete ABx for Klebsiella UTI  OP FU with Dr Saurav Delatorre to add  Assess--  UIP with stable radiographic parameters, mild restriction, diffusing defect. At baseline  Recent increased SOB due to superimposed gastric distention.  Venous BG shows no CO2 retention.  BPH with Chronic Ortiz complicated by UTI  POD 0 mini-laparotomy and reduction of hernia with closure of the defect    Rec--  02  Pain control  Incentive Spirometry  Mobilize  Truncal elevation for sleep 30 degrees  No food within 90 minutes of hs  Complete ABx for Klebsiella UTI  OP FU with Dr Saurav Delatorre to add

## 2021-08-28 LAB
ANION GAP SERPL CALC-SCNC: 10 MMOL/L — SIGNIFICANT CHANGE UP (ref 5–17)
BASOPHILS # BLD AUTO: 0.04 K/UL — SIGNIFICANT CHANGE UP (ref 0–0.2)
BASOPHILS NFR BLD AUTO: 0.4 % — SIGNIFICANT CHANGE UP (ref 0–2)
BUN SERPL-MCNC: 51.4 MG/DL — HIGH (ref 8–20)
CALCIUM SERPL-MCNC: 8.5 MG/DL — LOW (ref 8.6–10.2)
CHLORIDE SERPL-SCNC: 102 MMOL/L — SIGNIFICANT CHANGE UP (ref 98–107)
CO2 SERPL-SCNC: 28 MMOL/L — SIGNIFICANT CHANGE UP (ref 22–29)
CREAT SERPL-MCNC: 1.1 MG/DL — SIGNIFICANT CHANGE UP (ref 0.5–1.3)
CULTURE RESULTS: SIGNIFICANT CHANGE UP
CULTURE RESULTS: SIGNIFICANT CHANGE UP
EOSINOPHIL # BLD AUTO: 0.13 K/UL — SIGNIFICANT CHANGE UP (ref 0–0.5)
EOSINOPHIL NFR BLD AUTO: 1.3 % — SIGNIFICANT CHANGE UP (ref 0–6)
GLUCOSE SERPL-MCNC: 115 MG/DL — HIGH (ref 70–99)
HCT VFR BLD CALC: 43.5 % — SIGNIFICANT CHANGE UP (ref 39–50)
HGB BLD-MCNC: 14.2 G/DL — SIGNIFICANT CHANGE UP (ref 13–17)
IMM GRANULOCYTES NFR BLD AUTO: 0.5 % — SIGNIFICANT CHANGE UP (ref 0–1.5)
LYMPHOCYTES # BLD AUTO: 1.62 K/UL — SIGNIFICANT CHANGE UP (ref 1–3.3)
LYMPHOCYTES # BLD AUTO: 16 % — SIGNIFICANT CHANGE UP (ref 13–44)
MAGNESIUM SERPL-MCNC: 2.2 MG/DL — SIGNIFICANT CHANGE UP (ref 1.6–2.6)
MCHC RBC-ENTMCNC: 30.9 PG — SIGNIFICANT CHANGE UP (ref 27–34)
MCHC RBC-ENTMCNC: 32.6 GM/DL — SIGNIFICANT CHANGE UP (ref 32–36)
MCV RBC AUTO: 94.8 FL — SIGNIFICANT CHANGE UP (ref 80–100)
MONOCYTES # BLD AUTO: 0.97 K/UL — HIGH (ref 0–0.9)
MONOCYTES NFR BLD AUTO: 9.6 % — SIGNIFICANT CHANGE UP (ref 2–14)
NEUTROPHILS # BLD AUTO: 7.34 K/UL — SIGNIFICANT CHANGE UP (ref 1.8–7.4)
NEUTROPHILS NFR BLD AUTO: 72.2 % — SIGNIFICANT CHANGE UP (ref 43–77)
PHOSPHATE SERPL-MCNC: 2.1 MG/DL — LOW (ref 2.4–4.7)
PLATELET # BLD AUTO: 233 K/UL — SIGNIFICANT CHANGE UP (ref 150–400)
POTASSIUM SERPL-MCNC: 4.3 MMOL/L — SIGNIFICANT CHANGE UP (ref 3.5–5.3)
POTASSIUM SERPL-SCNC: 4.3 MMOL/L — SIGNIFICANT CHANGE UP (ref 3.5–5.3)
RBC # BLD: 4.59 M/UL — SIGNIFICANT CHANGE UP (ref 4.2–5.8)
RBC # FLD: 13.1 % — SIGNIFICANT CHANGE UP (ref 10.3–14.5)
SODIUM SERPL-SCNC: 140 MMOL/L — SIGNIFICANT CHANGE UP (ref 135–145)
SPECIMEN SOURCE: SIGNIFICANT CHANGE UP
SPECIMEN SOURCE: SIGNIFICANT CHANGE UP
WBC # BLD: 10.15 K/UL — SIGNIFICANT CHANGE UP (ref 3.8–10.5)
WBC # FLD AUTO: 10.15 K/UL — SIGNIFICANT CHANGE UP (ref 3.8–10.5)

## 2021-08-28 RX ORDER — ACETAMINOPHEN 500 MG
975 TABLET ORAL EVERY 6 HOURS
Refills: 0 | Status: DISCONTINUED | OUTPATIENT
Start: 2021-08-28 | End: 2021-08-30

## 2021-08-28 RX ADMIN — PANTOPRAZOLE SODIUM 40 MILLIGRAM(S): 20 TABLET, DELAYED RELEASE ORAL at 11:29

## 2021-08-28 RX ADMIN — Medication 975 MILLIGRAM(S): at 23:48

## 2021-08-28 RX ADMIN — HEPARIN SODIUM 5000 UNIT(S): 5000 INJECTION INTRAVENOUS; SUBCUTANEOUS at 05:59

## 2021-08-28 RX ADMIN — Medication 975 MILLIGRAM(S): at 11:29

## 2021-08-28 RX ADMIN — HEPARIN SODIUM 5000 UNIT(S): 5000 INJECTION INTRAVENOUS; SUBCUTANEOUS at 22:12

## 2021-08-28 RX ADMIN — Medication 62.5 MILLIMOLE(S): at 17:20

## 2021-08-28 RX ADMIN — Medication 81 MILLIGRAM(S): at 11:29

## 2021-08-28 RX ADMIN — Medication 975 MILLIGRAM(S): at 06:30

## 2021-08-28 RX ADMIN — Medication 1000 UNIT(S): at 11:29

## 2021-08-28 RX ADMIN — CEFTRIAXONE 100 MILLIGRAM(S): 500 INJECTION, POWDER, FOR SOLUTION INTRAMUSCULAR; INTRAVENOUS at 22:13

## 2021-08-28 RX ADMIN — TAMSULOSIN HYDROCHLORIDE 0.8 MILLIGRAM(S): 0.4 CAPSULE ORAL at 11:29

## 2021-08-28 RX ADMIN — Medication 0.5 MILLIGRAM(S): at 10:18

## 2021-08-28 RX ADMIN — AMLODIPINE BESYLATE 5 MILLIGRAM(S): 2.5 TABLET ORAL at 05:59

## 2021-08-28 RX ADMIN — Medication 975 MILLIGRAM(S): at 05:59

## 2021-08-28 RX ADMIN — Medication 1 TABLET(S): at 11:29

## 2021-08-28 RX ADMIN — HEPARIN SODIUM 5000 UNIT(S): 5000 INJECTION INTRAVENOUS; SUBCUTANEOUS at 13:51

## 2021-08-28 RX ADMIN — SODIUM CHLORIDE 100 MILLILITER(S): 9 INJECTION, SOLUTION INTRAVENOUS at 05:59

## 2021-08-28 NOTE — PROGRESS NOTE ADULT - SUBJECTIVE AND OBJECTIVE BOX
INTERVAL HPI/OVERNIGHT EVENTS:    Patient evaluated at bedside. No acute distress. No acute events overnight.  Underwent laparotomy and reduction of a paraduodenal hernia.  Tolerating FLD with no N&V. Pain is well controlled . Denies fever/chills, SOB.     MEDICATIONS  (STANDING):  amLODIPine   Tablet 5 milliGRAM(s) Oral daily  aspirin  chewable 81 milliGRAM(s) Oral daily  cefTRIAXone   IVPB 1000 milliGRAM(s) IV Intermittent every 24 hours  cholecalciferol 1000 Unit(s) Oral daily  heparin   Injectable 5000 Unit(s) SubCutaneous every 8 hours  lactated ringers. 1000 milliLiter(s) (100 mL/Hr) IV Continuous <Continuous>  multivitamin 1 Tablet(s) Oral daily  pantoprazole  Injectable 40 milliGRAM(s) IV Push daily  tamsulosin 0.8 milliGRAM(s) Oral daily    MEDICATIONS  (PRN):  acetaminophen   Tablet .. 650 milliGRAM(s) Oral every 6 hours PRN Temp greater or equal to 38C (100.4F), Mild Pain (1 - 3)  ALBUTerol    90 MICROgram(s) HFA Inhaler 2 Puff(s) Inhalation every 6 hours PRN Shortness of Breath and/or Wheezing  HYDROmorphone  Injectable 0.5 milliGRAM(s) IV Push every 4 hours PRN Severe Pain (7 - 10)  ketorolac   Injectable 15 milliGRAM(s) IV Push every 6 hours PRN Moderate Pain (4 - 6)  LORazepam   Injectable 0.5 milliGRAM(s) IV Push every 8 hours PRN Anxiety  ondansetron Injectable 4 milliGRAM(s) IV Push every 6 hours PRN Nausea and/or Vomiting      Vital Signs Last 24 Hrs  T(C): 36.5 (28 Aug 2021 04:38), Max: 36.6 (27 Aug 2021 23:48)  T(F): 97.7 (28 Aug 2021 04:38), Max: 97.8 (27 Aug 2021 23:48)  HR: 84 (28 Aug 2021 04:38) (80 - 88)  BP: 126/75 (28 Aug 2021 04:38) (91/56 - 126/75)  BP(mean): --  RR: 18 (28 Aug 2021 04:38) (16 - 18)  SpO2: 98% (28 Aug 2021 04:38) (94% - 98%)    Constitutional: NAD  HEENT: PERRLA, EOMI, no drainage or redness  Neck: No bruits; no thyromegaly or nodules,  No JVD  Back: Normal spine flexure, No CVA tenderness, No deformity or limitation of movement  Respiratory: Breath Sounds equal & clear to percussion & auscultation, no accessory muscle use  Cardiovascular: Regular rate & rhythm, normal S1, S2; no murmurs, gallops or rubs; no S3, S4  Gastrointestinal: Soft, tender along prevena dressing, no rebound or guarding, no hepatosplenomegaly, normal bowel sounds  Extremities: No peripheral edema, No cyanosis, clubbing   Vascular: Equal and normal pulses: 2+ peripheral pulses throughout  Neurological: A&O x 3; no sensory, motor or coordination deficits, normal reflexes  Psychiatric: Normal mood, normal affect  Musculoskeletal: No joint pain, swelling or deformity; no limitation of movement  Skin: No rashes      I&O's Detail    I&O's Detail    27 Aug 2021 07:01  -  28 Aug 2021 07:00  --------------------------------------------------------  IN:    Lactated Ringers: 900 mL  Total IN: 900 mL    OUT:    Indwelling Catheter - Urethral (mL): 500 mL  Total OUT: 500 mL    Total NET: 400 mL                        LABS:                                   15.4   14.34 )-----------( 237      ( 27 Aug 2021 07:08 )             44.9   08-27    136  |  97<L>  |  57.6<H>  ----------------------------<  153<H>  4.5   |  28.0  |  1.46<H>    Ca    8.7      27 Aug 2021 07:08  Phos  3.4     08-27  Mg     2.5     08-27        RADIOLOGY & ADDITIONAL STUDIES:

## 2021-08-28 NOTE — PROGRESS NOTE ADULT - ASSESSMENT
75 y/o male with PMH of BPH on chronic reilly, pulmonary fibrosis on home oxygen 2L, HTN came to the ED complaining of palpitation and low grade fever x 2 days. Was discovered to have a paraduodenal hernia and is POD2 from a laparotomy and reduction of hernia with closure of the defect.      - Pain control  - HD monitoring  - Pulm toilet , encourage IS use   - FLD, ADAT   - AROBF  - Maintain reilly, chronic   - DVT ppx.

## 2021-08-29 ENCOUNTER — TRANSCRIPTION ENCOUNTER (OUTPATIENT)
Age: 76
End: 2021-08-29

## 2021-08-29 RX ADMIN — CEFTRIAXONE 100 MILLIGRAM(S): 500 INJECTION, POWDER, FOR SOLUTION INTRAMUSCULAR; INTRAVENOUS at 21:54

## 2021-08-29 RX ADMIN — Medication 975 MILLIGRAM(S): at 17:32

## 2021-08-29 RX ADMIN — Medication 1000 UNIT(S): at 12:41

## 2021-08-29 RX ADMIN — Medication 1 TABLET(S): at 12:41

## 2021-08-29 RX ADMIN — HEPARIN SODIUM 5000 UNIT(S): 5000 INJECTION INTRAVENOUS; SUBCUTANEOUS at 21:54

## 2021-08-29 RX ADMIN — Medication 975 MILLIGRAM(S): at 12:41

## 2021-08-29 RX ADMIN — Medication 975 MILLIGRAM(S): at 13:11

## 2021-08-29 RX ADMIN — HEPARIN SODIUM 5000 UNIT(S): 5000 INJECTION INTRAVENOUS; SUBCUTANEOUS at 12:41

## 2021-08-29 RX ADMIN — AMLODIPINE BESYLATE 5 MILLIGRAM(S): 2.5 TABLET ORAL at 05:46

## 2021-08-29 RX ADMIN — Medication 0.5 MILLIGRAM(S): at 23:31

## 2021-08-29 RX ADMIN — HEPARIN SODIUM 5000 UNIT(S): 5000 INJECTION INTRAVENOUS; SUBCUTANEOUS at 05:47

## 2021-08-29 RX ADMIN — Medication 975 MILLIGRAM(S): at 22:57

## 2021-08-29 RX ADMIN — PANTOPRAZOLE SODIUM 40 MILLIGRAM(S): 20 TABLET, DELAYED RELEASE ORAL at 12:41

## 2021-08-29 RX ADMIN — Medication 975 MILLIGRAM(S): at 21:57

## 2021-08-29 RX ADMIN — Medication 81 MILLIGRAM(S): at 12:40

## 2021-08-29 RX ADMIN — Medication 975 MILLIGRAM(S): at 00:20

## 2021-08-29 RX ADMIN — Medication 975 MILLIGRAM(S): at 06:20

## 2021-08-29 RX ADMIN — TAMSULOSIN HYDROCHLORIDE 0.8 MILLIGRAM(S): 0.4 CAPSULE ORAL at 12:41

## 2021-08-29 RX ADMIN — Medication 975 MILLIGRAM(S): at 17:02

## 2021-08-29 RX ADMIN — Medication 975 MILLIGRAM(S): at 05:49

## 2021-08-29 NOTE — DISCHARGE NOTE PROVIDER - NSDCMRMEDTOKEN_GEN_ALL_CORE_FT
Advair Diskus:  inhaled   amLODIPine 5 mg oral tablet: 1 tab(s) orally once a day  aspirin 81 mg oral tablet, chewable: 1 tab(s) orally once a day  docusate sodium 100 mg oral capsule: 1 cap(s) orally 3 times a day  lisinopril 20 mg oral tablet: 1 tab(s) orally once a day  LORazepam 0.5 mg oral tablet: 1 tab(s) orally 2 times a day, As needed, Anxiety  tamsulosin 0.4 mg oral capsule: 2 cap(s) orally once a day (at bedtime)   acetaminophen 325 mg oral tablet: 3 tab(s) orally every 6 hours  Advair Diskus:  inhaled   amLODIPine 5 mg oral tablet: 1 tab(s) orally once a day  aspirin 81 mg oral tablet, chewable: 1 tab(s) orally once a day  docusate sodium 100 mg oral capsule: 1 cap(s) orally 3 times a day  lisinopril 20 mg oral tablet: 1 tab(s) orally once a day  LORazepam 0.5 mg oral tablet: 1 tab(s) orally 2 times a day, As needed, Anxiety  Multiple Vitamins oral tablet: 1 tab(s) orally once a day  oxyCODONE 5 mg oral capsule: 1 cap(s) orally every 6 hours MDD:4  tamsulosin 0.4 mg oral capsule: 2 cap(s) orally once a day (at bedtime)

## 2021-08-29 NOTE — PHYSICAL THERAPY INITIAL EVALUATION ADULT - PERTINENT HX OF CURRENT PROBLEM, REHAB EVAL
75 y/o male with PMH of BPH on chronic reilly, pulmonary fibrosis on home oxygen 2L, HTN came to the ED complaining of palpitation and low grade fever x 2 days. Admitted for UTI, hospital course c/b SBO 2/2 paraduodenal internal hernia s./p laparotomy and reduction of hernia with closure of the defect

## 2021-08-29 NOTE — DISCHARGE NOTE PROVIDER - HOSPITAL COURSE
77 y/o male with PMH of BPH on chronic reilly, pulmonary fibrosis on home oxygen 2L, HTN came to the ED complaining of palpitation and low grade fever x 2 days. Patient said his HR was 137 at home, was also having trouble breathing with minimal exertion, reported low grade fever 100 at home. He called his pulmonologist which suggested to come to the ED. Of note, he saw his urologist a week ago, reilly changed. CT a/p was obtained which revealed abnormal cluster of nondilated bowel loops in the right upper quadrant with abnormal configuration at the ligament of Treitz suggestive of an internal hernia, possibly right paraduodenal. This results in gastric outlet obstruction although some contrast passes into the small bowel. He underwent an exploratory laparotomy with reduction of internal hernia on 08/27/21. Post-operatively, he was doing well. 77 y/o male with PMH of BPH on chronic reilly, pulmonary fibrosis on home oxygen 2L, HTN came to the ED complaining of palpitation and low grade fever x 2 days. Patient said his HR was 137 at home, was also having trouble breathing with minimal exertion, reported low grade fever 100 at home. He called his pulmonologist which suggested to come to the ED. Of note, he saw his urologist a week ago, reilly changed. CT a/p was obtained which revealed abnormal cluster of nondilated bowel loops in the right upper quadrant with abnormal configuration at the ligament of Treitz suggestive of an internal hernia, possibly right paraduodenal. This results in gastric outlet obstruction although some contrast passes into the small bowel. He underwent an exploratory laparotomy with reduction of internal hernia on 08/27/21. Post-operatively, the patient was able to tolerate diet, pain is controlled with PO medications, and is stable to be discharged home with home PT.

## 2021-08-29 NOTE — DISCHARGE NOTE PROVIDER - DETAILS OF MALNUTRITION DIAGNOSIS/DIAGNOSES
This patient has been assessed with a concern for Malnutrition and was treated during this hospitalization for the following Nutrition diagnosis/diagnoses:     -  08/26/2021: Moderate protein-calorie malnutrition

## 2021-08-29 NOTE — DISCHARGE NOTE PROVIDER - CARE PROVIDER_API CALL
Brandon Low)  Surgery  [address] [place] [address]  Phone: [phone]  Fax: [phone]  Follow Up Time: 2 weeks   If you had a biopsy, you should not take aspirin or aspirin like products for the next 10 days unless instructed to do so by your doctor. If you had a biopsy, check with your doctor before taking any blood thinners such as warfarin (Coumadin).

## 2021-08-29 NOTE — PROGRESS NOTE ADULT - ASSESSMENT
75 y/o male with PMH of BPH on chronic reilly, pulmonary fibrosis on home oxygen 2L, HTN came to the ED complaining of palpitation and low grade fever x 2 days. Admitted for UTI, hospital course c/b SBO 2/2 paraduodenal internal hernia s./p laparotomy and reduction of hernia with closure of the defect  Improving clinically  tolerating diet,, BM+  -continue diet  - Pain control  - HD monitoring  - Pulm toilet , encourage IS use   - Maintain reilly, chronic   -Ceftriaxone until 8/30 for UTI  - DVT ppx  -F/u PT for disposition

## 2021-08-29 NOTE — PROGRESS NOTE ADULT - SUBJECTIVE AND OBJECTIVE BOX
INTERVAL HPI/OVERNIGHT EVENTS:  Patient seen and examined  No abdominal pain  BM+, flatus+  Tolerating diet  Ortiz in place  On IV Ceftriaxone for UTI  AVSS    MEDICATIONS  (STANDING):  acetaminophen   Tablet .. 975 milliGRAM(s) Oral every 6 hours  amLODIPine   Tablet 5 milliGRAM(s) Oral daily  aspirin  chewable 81 milliGRAM(s) Oral daily  cefTRIAXone   IVPB 1000 milliGRAM(s) IV Intermittent every 24 hours  cholecalciferol 1000 Unit(s) Oral daily  heparin   Injectable 5000 Unit(s) SubCutaneous every 8 hours  multivitamin 1 Tablet(s) Oral daily  pantoprazole  Injectable 40 milliGRAM(s) IV Push daily  tamsulosin 0.8 milliGRAM(s) Oral daily    MEDICATIONS  (PRN):  ALBUTerol    90 MICROgram(s) HFA Inhaler 2 Puff(s) Inhalation every 6 hours PRN Shortness of Breath and/or Wheezing  HYDROmorphone  Injectable 0.5 milliGRAM(s) IV Push every 4 hours PRN Severe Pain (7 - 10)  ketorolac   Injectable 15 milliGRAM(s) IV Push every 6 hours PRN Moderate Pain (4 - 6)  LORazepam   Injectable 0.5 milliGRAM(s) IV Push every 8 hours PRN Anxiety  ondansetron Injectable 4 milliGRAM(s) IV Push every 6 hours PRN Nausea and/or Vomiting      Vital Signs Last 24 Hrs  T(C): 36.4 (28 Aug 2021 23:53), Max: 36.5 (28 Aug 2021 04:38)  T(F): 97.5 (28 Aug 2021 23:53), Max: 97.7 (28 Aug 2021 04:38)  HR: 72 (28 Aug 2021 23:53) (72 - 86)  BP: 110/68 (28 Aug 2021 23:53) (104/62 - 126/75)  BP(mean): --  RR: 17 (28 Aug 2021 23:53) (16 - 18)  SpO2: 98% (28 Aug 2021 23:53) (95% - 98%)    Physical Exam:  Constitutional: NAD  Respiratory: CTAB, on NC  Cardiovascular: RRR, S1/S2  Gastrointestinal: Soft, tender along prevena dressing, no rebound or guarding, no hepatosplenomegaly, normal bowel sounds  Extremities: No peripheral edema, No cyanosis, clubbing   Neurological: A&O x 3; no focal deficits  Skin: No rash    I&O's Detail    27 Aug 2021 07:01  -  28 Aug 2021 07:00  --------------------------------------------------------  IN:    Lactated Ringers: 900 mL  Total IN: 900 mL    OUT:    Indwelling Catheter - Urethral (mL): 500 mL  Total OUT: 500 mL    Total NET: 400 mL      28 Aug 2021 07:01  -  29 Aug 2021 02:57  --------------------------------------------------------  IN:  Total IN: 0 mL    OUT:    Indwelling Catheter - Urethral (mL): 900 mL  Total OUT: 900 mL    Total NET: -900 mL          LABS:                        14.2   10.15 )-----------( 233      ( 28 Aug 2021 08:53 )             43.5     08-28    140  |  102  |  51.4<H>  ----------------------------<  115<H>  4.3   |  28.0  |  1.10    Ca    8.5<L>      28 Aug 2021 08:53  Phos  2.1     08-28  Mg     2.2     08-28   Improved. Has used 5 doses of Dilaudid 0.25mg in the last 24h. MDD: 25   Consider transitioning to long acting oral agents now that patient is tolerating PO and in anticipation to discharge.  Recommend STANDING MS Contin 15mg PO q12h or OxyCONTIN 10mg PO q12h  Recommend PRN Oxycodone 5mg PO q4h PRN breakthrough pain.

## 2021-08-30 ENCOUNTER — TRANSCRIPTION ENCOUNTER (OUTPATIENT)
Age: 76
End: 2021-08-30

## 2021-08-30 VITALS
HEART RATE: 97 BPM | SYSTOLIC BLOOD PRESSURE: 118 MMHG | RESPIRATION RATE: 18 BRPM | OXYGEN SATURATION: 96 % | TEMPERATURE: 98 F | DIASTOLIC BLOOD PRESSURE: 74 MMHG

## 2021-08-30 LAB
ANION GAP SERPL CALC-SCNC: 9 MMOL/L — SIGNIFICANT CHANGE UP (ref 5–17)
BASOPHILS # BLD AUTO: 0.05 K/UL — SIGNIFICANT CHANGE UP (ref 0–0.2)
BASOPHILS NFR BLD AUTO: 0.7 % — SIGNIFICANT CHANGE UP (ref 0–2)
BUN SERPL-MCNC: 23.2 MG/DL — HIGH (ref 8–20)
CALCIUM SERPL-MCNC: 8.5 MG/DL — LOW (ref 8.6–10.2)
CHLORIDE SERPL-SCNC: 100 MMOL/L — SIGNIFICANT CHANGE UP (ref 98–107)
CO2 SERPL-SCNC: 26 MMOL/L — SIGNIFICANT CHANGE UP (ref 22–29)
CREAT SERPL-MCNC: 0.82 MG/DL — SIGNIFICANT CHANGE UP (ref 0.5–1.3)
EOSINOPHIL # BLD AUTO: 0.67 K/UL — HIGH (ref 0–0.5)
EOSINOPHIL NFR BLD AUTO: 9.2 % — HIGH (ref 0–6)
GLUCOSE SERPL-MCNC: 100 MG/DL — HIGH (ref 70–99)
HCT VFR BLD CALC: 37.9 % — LOW (ref 39–50)
HGB BLD-MCNC: 12.8 G/DL — LOW (ref 13–17)
IMM GRANULOCYTES NFR BLD AUTO: 0.5 % — SIGNIFICANT CHANGE UP (ref 0–1.5)
LYMPHOCYTES # BLD AUTO: 1.48 K/UL — SIGNIFICANT CHANGE UP (ref 1–3.3)
LYMPHOCYTES # BLD AUTO: 20.3 % — SIGNIFICANT CHANGE UP (ref 13–44)
MAGNESIUM SERPL-MCNC: 1.9 MG/DL — SIGNIFICANT CHANGE UP (ref 1.8–2.6)
MCHC RBC-ENTMCNC: 31.1 PG — SIGNIFICANT CHANGE UP (ref 27–34)
MCHC RBC-ENTMCNC: 33.8 GM/DL — SIGNIFICANT CHANGE UP (ref 32–36)
MCV RBC AUTO: 92.2 FL — SIGNIFICANT CHANGE UP (ref 80–100)
MONOCYTES # BLD AUTO: 0.59 K/UL — SIGNIFICANT CHANGE UP (ref 0–0.9)
MONOCYTES NFR BLD AUTO: 8.1 % — SIGNIFICANT CHANGE UP (ref 2–14)
NEUTROPHILS # BLD AUTO: 4.46 K/UL — SIGNIFICANT CHANGE UP (ref 1.8–7.4)
NEUTROPHILS NFR BLD AUTO: 61.2 % — SIGNIFICANT CHANGE UP (ref 43–77)
PHOSPHATE SERPL-MCNC: 2.2 MG/DL — LOW (ref 2.4–4.7)
PLATELET # BLD AUTO: 198 K/UL — SIGNIFICANT CHANGE UP (ref 150–400)
POTASSIUM SERPL-MCNC: 4.2 MMOL/L — SIGNIFICANT CHANGE UP (ref 3.5–5.3)
POTASSIUM SERPL-SCNC: 4.2 MMOL/L — SIGNIFICANT CHANGE UP (ref 3.5–5.3)
RBC # BLD: 4.11 M/UL — LOW (ref 4.2–5.8)
RBC # FLD: 12.5 % — SIGNIFICANT CHANGE UP (ref 10.3–14.5)
SARS-COV-2 RNA SPEC QL NAA+PROBE: SIGNIFICANT CHANGE UP
SODIUM SERPL-SCNC: 135 MMOL/L — SIGNIFICANT CHANGE UP (ref 135–145)
WBC # BLD: 7.29 K/UL — SIGNIFICANT CHANGE UP (ref 3.8–10.5)
WBC # FLD AUTO: 7.29 K/UL — SIGNIFICANT CHANGE UP (ref 3.8–10.5)

## 2021-08-30 PROCEDURE — 85014 HEMATOCRIT: CPT

## 2021-08-30 PROCEDURE — 74176 CT ABD & PELVIS W/O CONTRAST: CPT

## 2021-08-30 PROCEDURE — 85610 PROTHROMBIN TIME: CPT

## 2021-08-30 PROCEDURE — 71045 X-RAY EXAM CHEST 1 VIEW: CPT

## 2021-08-30 PROCEDURE — 87040 BLOOD CULTURE FOR BACTERIA: CPT

## 2021-08-30 PROCEDURE — 86769 SARS-COV-2 COVID-19 ANTIBODY: CPT

## 2021-08-30 PROCEDURE — 87186 SC STD MICRODIL/AGAR DIL: CPT

## 2021-08-30 PROCEDURE — 96374 THER/PROPH/DIAG INJ IV PUSH: CPT

## 2021-08-30 PROCEDURE — 99285 EMERGENCY DEPT VISIT HI MDM: CPT | Mod: 25

## 2021-08-30 PROCEDURE — 97116 GAIT TRAINING THERAPY: CPT

## 2021-08-30 PROCEDURE — 86850 RBC ANTIBODY SCREEN: CPT

## 2021-08-30 PROCEDURE — U0005: CPT

## 2021-08-30 PROCEDURE — 82803 BLOOD GASES ANY COMBINATION: CPT

## 2021-08-30 PROCEDURE — 85730 THROMBOPLASTIN TIME PARTIAL: CPT

## 2021-08-30 PROCEDURE — 85018 HEMOGLOBIN: CPT

## 2021-08-30 PROCEDURE — 87086 URINE CULTURE/COLONY COUNT: CPT

## 2021-08-30 PROCEDURE — 85025 COMPLETE CBC W/AUTO DIFF WBC: CPT

## 2021-08-30 PROCEDURE — 82435 ASSAY OF BLOOD CHLORIDE: CPT

## 2021-08-30 PROCEDURE — 82330 ASSAY OF CALCIUM: CPT

## 2021-08-30 PROCEDURE — 84100 ASSAY OF PHOSPHORUS: CPT

## 2021-08-30 PROCEDURE — C9399: CPT

## 2021-08-30 PROCEDURE — 87077 CULTURE AEROBIC IDENTIFY: CPT

## 2021-08-30 PROCEDURE — 0225U NFCT DS DNA&RNA 21 SARSCOV2: CPT

## 2021-08-30 PROCEDURE — 87635 SARS-COV-2 COVID-19 AMP PRB: CPT

## 2021-08-30 PROCEDURE — 93005 ELECTROCARDIOGRAM TRACING: CPT

## 2021-08-30 PROCEDURE — 80048 BASIC METABOLIC PNL TOTAL CA: CPT

## 2021-08-30 PROCEDURE — 86901 BLOOD TYPING SEROLOGIC RH(D): CPT

## 2021-08-30 PROCEDURE — 83605 ASSAY OF LACTIC ACID: CPT

## 2021-08-30 PROCEDURE — 83735 ASSAY OF MAGNESIUM: CPT

## 2021-08-30 PROCEDURE — 84295 ASSAY OF SERUM SODIUM: CPT

## 2021-08-30 PROCEDURE — 84132 ASSAY OF SERUM POTASSIUM: CPT

## 2021-08-30 PROCEDURE — 86900 BLOOD TYPING SEROLOGIC ABO: CPT

## 2021-08-30 PROCEDURE — 82947 ASSAY GLUCOSE BLOOD QUANT: CPT

## 2021-08-30 PROCEDURE — U0003: CPT

## 2021-08-30 PROCEDURE — 80053 COMPREHEN METABOLIC PANEL: CPT

## 2021-08-30 PROCEDURE — 81001 URINALYSIS AUTO W/SCOPE: CPT

## 2021-08-30 PROCEDURE — 96375 TX/PRO/DX INJ NEW DRUG ADDON: CPT

## 2021-08-30 PROCEDURE — 36415 COLL VENOUS BLD VENIPUNCTURE: CPT

## 2021-08-30 PROCEDURE — 85027 COMPLETE CBC AUTOMATED: CPT

## 2021-08-30 PROCEDURE — 97110 THERAPEUTIC EXERCISES: CPT

## 2021-08-30 RX ORDER — OXYCODONE HYDROCHLORIDE 5 MG/1
1 TABLET ORAL
Qty: 10 | Refills: 0
Start: 2021-08-30 | End: 2021-09-03

## 2021-08-30 RX ORDER — ACETAMINOPHEN 500 MG
3 TABLET ORAL
Qty: 120 | Refills: 0
Start: 2021-08-30 | End: 2021-09-08

## 2021-08-30 RX ADMIN — Medication 975 MILLIGRAM(S): at 11:11

## 2021-08-30 RX ADMIN — PANTOPRAZOLE SODIUM 40 MILLIGRAM(S): 20 TABLET, DELAYED RELEASE ORAL at 11:11

## 2021-08-30 RX ADMIN — Medication 0.5 MILLIGRAM(S): at 12:34

## 2021-08-30 RX ADMIN — AMLODIPINE BESYLATE 5 MILLIGRAM(S): 2.5 TABLET ORAL at 05:02

## 2021-08-30 RX ADMIN — Medication 1 TABLET(S): at 11:11

## 2021-08-30 RX ADMIN — Medication 81 MILLIGRAM(S): at 11:11

## 2021-08-30 RX ADMIN — Medication 975 MILLIGRAM(S): at 11:41

## 2021-08-30 RX ADMIN — HEPARIN SODIUM 5000 UNIT(S): 5000 INJECTION INTRAVENOUS; SUBCUTANEOUS at 05:01

## 2021-08-30 RX ADMIN — TAMSULOSIN HYDROCHLORIDE 0.8 MILLIGRAM(S): 0.4 CAPSULE ORAL at 11:11

## 2021-08-30 RX ADMIN — Medication 975 MILLIGRAM(S): at 05:41

## 2021-08-30 RX ADMIN — Medication 975 MILLIGRAM(S): at 05:02

## 2021-08-30 RX ADMIN — Medication 1000 UNIT(S): at 11:13

## 2021-08-30 RX ADMIN — HEPARIN SODIUM 5000 UNIT(S): 5000 INJECTION INTRAVENOUS; SUBCUTANEOUS at 11:12

## 2021-08-30 NOTE — PROGRESS NOTE ADULT - ATTENDING COMMENTS
Pt OOB & on full liquid diet.  Wound ok.  Ready for discharge pending diet advance and mobility.  Patient lives alone and may need VNS for 1-2 days after discharge.
The patient is resting comfortably in bed. NG is out and the patient is sipping clear liquids. The wound is covered with a Pravena dressing and the skin around is non erythematous. Patient is doing well for POD 1. Plan to advance diet and get the pt out of bed.
Patient is ready for D/C today and will follow up with me in a week.

## 2021-08-30 NOTE — PROGRESS NOTE ADULT - SUBJECTIVE AND OBJECTIVE BOX
SUBJECTIVE:    Patient seen and examined  No abdominal pain  BM+, flatus+  Tolerating diet  Ortiz in place  On IV Ceftriaxone for UTI  AVSS    Vital Signs Last 24 Hrs  T(C): 36.4 (30 Aug 2021 04:24), Max: 36.6 (29 Aug 2021 16:15)  T(F): 97.6 (30 Aug 2021 04:24), Max: 97.9 (29 Aug 2021 16:15)  HR: 71 (30 Aug 2021 04:24) (70 - 76)  BP: 112/69 (30 Aug 2021 04:24) (109/72 - 115/70)  BP(mean): --  RR: 18 (30 Aug 2021 04:24) (18 - 18)  SpO2: 95% (30 Aug 2021 04:24) (95% - 97%)    PHYSICAL EXAM:  Constitutional: NAD  Respiratory: CTAB, on NC  Cardiovascular: RRR, S1/S2  Gastrointestinal: Soft, tender along prevena dressing, no rebound or guarding, no hepatosplenomegaly, normal bowel sounds  Extremities: No peripheral edema, No cyanosis, clubbing   Neurological: A&O x 3; no focal deficits  Skin: No rash    I&O's Summary    28 Aug 2021 07:01  -  29 Aug 2021 07:00  --------------------------------------------------------  IN: 900 mL / OUT: 1050 mL / NET: -150 mL    29 Aug 2021 07:01  -  30 Aug 2021 04:42  --------------------------------------------------------  IN: 50 mL / OUT: 2400 mL / NET: -2350 mL      I&O's Detail    28 Aug 2021 07:01  -  29 Aug 2021 07:00  --------------------------------------------------------  IN:    Lactated Ringers: 400 mL    Oral Fluid: 500 mL  Total IN: 900 mL    OUT:    Indwelling Catheter - Urethral (mL): 1050 mL  Total OUT: 1050 mL    Total NET: -150 mL      29 Aug 2021 07:01  -  30 Aug 2021 04:42  --------------------------------------------------------  IN:    IV PiggyBack: 50 mL  Total IN: 50 mL    OUT:    Indwelling Catheter - Urethral (mL): 200 mL    Voided (mL): 2200 mL  Total OUT: 2400 mL    Total NET: -2350 mL          MEDICATIONS  (STANDING):  acetaminophen   Tablet .. 975 milliGRAM(s) Oral every 6 hours  amLODIPine   Tablet 5 milliGRAM(s) Oral daily  aspirin  chewable 81 milliGRAM(s) Oral daily  cholecalciferol 1000 Unit(s) Oral daily  heparin   Injectable 5000 Unit(s) SubCutaneous every 8 hours  multivitamin 1 Tablet(s) Oral daily  pantoprazole  Injectable 40 milliGRAM(s) IV Push daily  tamsulosin 0.8 milliGRAM(s) Oral daily    MEDICATIONS  (PRN):  ALBUTerol    90 MICROgram(s) HFA Inhaler 2 Puff(s) Inhalation every 6 hours PRN Shortness of Breath and/or Wheezing  HYDROmorphone  Injectable 0.5 milliGRAM(s) IV Push every 4 hours PRN Severe Pain (7 - 10)  ketorolac   Injectable 15 milliGRAM(s) IV Push every 6 hours PRN Moderate Pain (4 - 6)  LORazepam   Injectable 0.5 milliGRAM(s) IV Push every 8 hours PRN Anxiety  ondansetron Injectable 4 milliGRAM(s) IV Push every 6 hours PRN Nausea and/or Vomiting      LABS:                        14.2   10.15 )-----------( 233      ( 28 Aug 2021 08:53 )             43.5     08-28    140  |  102  |  51.4<H>  ----------------------------<  115<H>  4.3   |  28.0  |  1.10    Ca    8.5<L>      28 Aug 2021 08:53  Phos  2.1     08-28  Mg     2.2     08-28            RADIOLOGY & ADDITIONAL STUDIES:

## 2021-08-30 NOTE — DISCHARGE NOTE NURSING/CASE MANAGEMENT/SOCIAL WORK - PATIENT PORTAL LINK FT
You can access the FollowMyHealth Patient Portal offered by [facility] by registering at the following website: [email or web address]. By joining [employer]’s FollowMyHealth portal, you will also be able to view your health information using other applications (apps) compatible with our system.

## 2021-08-30 NOTE — PROGRESS NOTE ADULT - NUTRITIONAL ASSESSMENT
This patient has been assessed with a concern for Malnutrition and has been determined to have a diagnosis/diagnoses of Moderate protein-calorie malnutrition.    This patient is being managed with:   Diet NPO-  Entered: Aug 26 2021  1:03PM    
This patient has been assessed with a concern for Malnutrition and has been determined to have a diagnosis/diagnoses of Moderate protein-calorie malnutrition.    This patient is being managed with:   Diet Regular-  Entered: Aug 28 2021 11:48AM    
This patient has been assessed with a concern for Malnutrition and has been determined to have a diagnosis/diagnoses of Moderate protein-calorie malnutrition.    This patient is being managed with:   Diet NPO-  Entered: Aug 26 2021  1:03PM

## 2021-08-30 NOTE — DISCHARGE NOTE NURSING/CASE MANAGEMENT/SOCIAL WORK - NSDCPEFALRISK_GEN_ALL_CORE
For information on Fall & injury Prevention, visit [email or web address]

## 2021-09-09 ENCOUNTER — APPOINTMENT (OUTPATIENT)
Dept: SURGERY | Facility: CLINIC | Age: 76
End: 2021-09-09

## 2021-09-09 VITALS
WEIGHT: 200 LBS | DIASTOLIC BLOOD PRESSURE: 67 MMHG | HEIGHT: 70 IN | OXYGEN SATURATION: 98 % | HEART RATE: 100 BPM | SYSTOLIC BLOOD PRESSURE: 105 MMHG | BODY MASS INDEX: 28.63 KG/M2

## 2021-09-16 ENCOUNTER — NON-APPOINTMENT (OUTPATIENT)
Age: 76
End: 2021-09-16

## 2021-11-08 ENCOUNTER — EMERGENCY (EMERGENCY)
Facility: HOSPITAL | Age: 76
LOS: 1 days | Discharge: HOME | End: 2021-11-08
Attending: EMERGENCY MEDICINE
Payer: MEDICARE

## 2021-11-08 VITALS
OXYGEN SATURATION: 97 % | DIASTOLIC BLOOD PRESSURE: 64 MMHG | TEMPERATURE: 98 F | HEIGHT: 71 IN | HEART RATE: 99 BPM | SYSTOLIC BLOOD PRESSURE: 132 MMHG | RESPIRATION RATE: 18 BRPM

## 2021-11-08 DIAGNOSIS — Z87.81 PERSONAL HISTORY OF (HEALED) TRAUMATIC FRACTURE: Chronic | ICD-10-CM

## 2021-11-08 DIAGNOSIS — Z98.89 OTHER SPECIFIED POSTPROCEDURAL STATES: Chronic | ICD-10-CM

## 2021-11-08 LAB
ALBUMIN SERPL ELPH-MCNC: 3.4 G/DL — SIGNIFICANT CHANGE UP (ref 3.3–5.2)
ALP SERPL-CCNC: 70 U/L — SIGNIFICANT CHANGE UP (ref 40–120)
ALT FLD-CCNC: 9 U/L — SIGNIFICANT CHANGE UP
ANION GAP SERPL CALC-SCNC: 11 MMOL/L — SIGNIFICANT CHANGE UP (ref 5–17)
APPEARANCE UR: ABNORMAL
AST SERPL-CCNC: 20 U/L — SIGNIFICANT CHANGE UP
BACTERIA # UR AUTO: ABNORMAL
BASOPHILS # BLD AUTO: 0.06 K/UL — SIGNIFICANT CHANGE UP (ref 0–0.2)
BASOPHILS NFR BLD AUTO: 0.5 % — SIGNIFICANT CHANGE UP (ref 0–2)
BILIRUB SERPL-MCNC: 0.6 MG/DL — SIGNIFICANT CHANGE UP (ref 0.4–2)
BILIRUB UR-MCNC: NEGATIVE — SIGNIFICANT CHANGE UP
BUN SERPL-MCNC: 26.3 MG/DL — HIGH (ref 8–20)
CALCIUM SERPL-MCNC: 9.1 MG/DL — SIGNIFICANT CHANGE UP (ref 8.6–10.2)
CHLORIDE SERPL-SCNC: 100 MMOL/L — SIGNIFICANT CHANGE UP (ref 98–107)
CO2 SERPL-SCNC: 23 MMOL/L — SIGNIFICANT CHANGE UP (ref 22–29)
COLOR SPEC: ABNORMAL
CREAT SERPL-MCNC: 1.07 MG/DL — SIGNIFICANT CHANGE UP (ref 0.5–1.3)
DIFF PNL FLD: ABNORMAL
EOSINOPHIL # BLD AUTO: 0.42 K/UL — SIGNIFICANT CHANGE UP (ref 0–0.5)
EOSINOPHIL NFR BLD AUTO: 3.7 % — SIGNIFICANT CHANGE UP (ref 0–6)
EPI CELLS # UR: SIGNIFICANT CHANGE UP
GLUCOSE SERPL-MCNC: 120 MG/DL — HIGH (ref 70–99)
GLUCOSE UR QL: NEGATIVE MG/DL — SIGNIFICANT CHANGE UP
HCT VFR BLD CALC: 37.4 % — LOW (ref 39–50)
HGB BLD-MCNC: 12.7 G/DL — LOW (ref 13–17)
IMM GRANULOCYTES NFR BLD AUTO: 0.3 % — SIGNIFICANT CHANGE UP (ref 0–1.5)
KETONES UR-MCNC: NEGATIVE — SIGNIFICANT CHANGE UP
LEUKOCYTE ESTERASE UR-ACNC: ABNORMAL
LYMPHOCYTES # BLD AUTO: 1.03 K/UL — SIGNIFICANT CHANGE UP (ref 1–3.3)
LYMPHOCYTES # BLD AUTO: 9 % — LOW (ref 13–44)
MCHC RBC-ENTMCNC: 32.1 PG — SIGNIFICANT CHANGE UP (ref 27–34)
MCHC RBC-ENTMCNC: 34 GM/DL — SIGNIFICANT CHANGE UP (ref 32–36)
MCV RBC AUTO: 94.4 FL — SIGNIFICANT CHANGE UP (ref 80–100)
MONOCYTES # BLD AUTO: 0.88 K/UL — SIGNIFICANT CHANGE UP (ref 0–0.9)
MONOCYTES NFR BLD AUTO: 7.7 % — SIGNIFICANT CHANGE UP (ref 2–14)
NEUTROPHILS # BLD AUTO: 9.06 K/UL — HIGH (ref 1.8–7.4)
NEUTROPHILS NFR BLD AUTO: 78.8 % — HIGH (ref 43–77)
NITRITE UR-MCNC: NEGATIVE — SIGNIFICANT CHANGE UP
PH UR: 6.5 — SIGNIFICANT CHANGE UP (ref 5–8)
PLATELET # BLD AUTO: 232 K/UL — SIGNIFICANT CHANGE UP (ref 150–400)
POTASSIUM SERPL-MCNC: 4.6 MMOL/L — SIGNIFICANT CHANGE UP (ref 3.5–5.3)
POTASSIUM SERPL-SCNC: 4.6 MMOL/L — SIGNIFICANT CHANGE UP (ref 3.5–5.3)
PROT SERPL-MCNC: 8 G/DL — SIGNIFICANT CHANGE UP (ref 6.6–8.7)
PROT UR-MCNC: 30 MG/DL
RBC # BLD: 3.96 M/UL — LOW (ref 4.2–5.8)
RBC # FLD: 13.9 % — SIGNIFICANT CHANGE UP (ref 10.3–14.5)
RBC CASTS # UR COMP ASSIST: >50 /HPF (ref 0–4)
SODIUM SERPL-SCNC: 134 MMOL/L — LOW (ref 135–145)
SP GR SPEC: 1 — LOW (ref 1.01–1.02)
UROBILINOGEN FLD QL: NEGATIVE MG/DL — SIGNIFICANT CHANGE UP
WBC # BLD: 11.48 K/UL — HIGH (ref 3.8–10.5)
WBC # FLD AUTO: 11.48 K/UL — HIGH (ref 3.8–10.5)
WBC UR QL: SIGNIFICANT CHANGE UP

## 2021-11-08 PROCEDURE — 36415 COLL VENOUS BLD VENIPUNCTURE: CPT

## 2021-11-08 PROCEDURE — 87086 URINE CULTURE/COLONY COUNT: CPT

## 2021-11-08 PROCEDURE — 80053 COMPREHEN METABOLIC PANEL: CPT

## 2021-11-08 PROCEDURE — 81001 URINALYSIS AUTO W/SCOPE: CPT

## 2021-11-08 PROCEDURE — 99284 EMERGENCY DEPT VISIT MOD MDM: CPT

## 2021-11-08 PROCEDURE — 99284 EMERGENCY DEPT VISIT MOD MDM: CPT | Mod: 25

## 2021-11-08 PROCEDURE — 96374 THER/PROPH/DIAG INJ IV PUSH: CPT

## 2021-11-08 PROCEDURE — 85025 COMPLETE CBC W/AUTO DIFF WBC: CPT

## 2021-11-08 RX ORDER — CEFPODOXIME PROXETIL 100 MG
1 TABLET ORAL
Qty: 20 | Refills: 0
Start: 2021-11-08 | End: 2021-11-17

## 2021-11-08 RX ORDER — CEFTRIAXONE 500 MG/1
1000 INJECTION, POWDER, FOR SOLUTION INTRAMUSCULAR; INTRAVENOUS ONCE
Refills: 0 | Status: COMPLETED | OUTPATIENT
Start: 2021-11-08 | End: 2021-11-08

## 2021-11-08 RX ADMIN — CEFTRIAXONE 100 MILLIGRAM(S): 500 INJECTION, POWDER, FOR SOLUTION INTRAMUSCULAR; INTRAVENOUS at 23:05

## 2021-11-08 NOTE — ED PROVIDER NOTE - NSFOLLOWUPINSTRUCTIONS_ED_ALL_ED_FT
Follow up with your urologist. Take antibiotic as prescribed.     Urinary Tract Infection    A urinary tract infection (UTI) is an infection of any part of the urinary tract, which includes the kidneys, ureters, bladder, and urethra. Risk factors include ignoring your need to urinate, wiping back to front if female, being an uncircumcised male, and having diabetes or a weak immune system. Symptoms include frequent urination, pain or burning with urination, foul smelling urine, cloudy urine, pain in the lower abdomen, blood in the urine, and fever. If you were prescribed an antibiotic medicine, take it as told by your health care provider. Do not stop taking the antibiotic even if you start to feel better.    SEEK IMMEDIATE MEDICAL CARE IF YOU HAVE ANY OF THE FOLLOWING SYMPTOMS: severe back or abdominal pain, fever, inability to keep fluids or medicine down, dizziness/lightheadedness, or a change in mental status.

## 2021-11-08 NOTE — ED PROVIDER NOTE - OBJECTIVE STATEMENT
77 y/o male with PMH of BPH on chronic reilly, pulmonary fibrosis on home oxygen 2L, HTN c/o hematuria in reilly. States had bleeding yesterday, spoke to his urologist at [facility] who said if continues to come in. stopped this morning so didn't come in, st neftalid to bleed again 6pm. no obstruction, no suprapubic or flank pain. no f/c. on asa no a/c. no other complaints.     ROS: No fever/chills. No eye pain/changes in vision, No ear pain/sore throat/dysphagia, No chest pain/palpitations. No SOB/cough/. No abdominal pain, N/V/D, no black/bloody bm. No dysuria/frequency/discharge, No headache. No Dizziness.    No rashes or breaks in skin. No numbness/tingling/weakness.

## 2021-11-08 NOTE — ED ADULT TRIAGE NOTE - CHIEF COMPLAINT QUOTE
patient states that he is having hematuria since sunday, patient states that the bleeding stopped this morning and then started again tonight. patient with a catheter in place patient with bright red blood in catheter

## 2021-11-08 NOTE — ED PROVIDER NOTE - PATIENT PORTAL LINK FT
You can access the FollowMyHealth Patient Portal offered by [facility] by registering at the following website: [email or web address]. By joining [employer]’s FollowMyHealth portal, you will also be able to view your health information using other applications (apps) compatible with our system.

## 2021-11-09 NOTE — ED ADULT NURSE REASSESSMENT NOTE - NS ED NURSE REASSESS COMMENT FT1
pt switched to leg bag after bladder irrigation, draining mild pink urine, left in wheelchair security called cab for pt, IV DC'd, pt stable.

## 2021-11-10 LAB
CULTURE RESULTS: SIGNIFICANT CHANGE UP
SPECIMEN SOURCE: SIGNIFICANT CHANGE UP

## 2021-11-23 ENCOUNTER — APPOINTMENT (OUTPATIENT)
Dept: CARDIOLOGY | Facility: CLINIC | Age: 76
End: 2021-11-23

## 2021-12-03 ENCOUNTER — APPOINTMENT (OUTPATIENT)
Dept: RHEUMATOLOGY | Facility: CLINIC | Age: 76
End: 2021-12-03
Payer: MEDICARE

## 2021-12-03 DIAGNOSIS — R52 PAIN, UNSPECIFIED: ICD-10-CM

## 2021-12-03 PROCEDURE — 99204 OFFICE O/P NEW MOD 45 MIN: CPT

## 2021-12-03 NOTE — HISTORY OF PRESENT ILLNESS
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 \par Pt has been following with pulmonary for IPF and discussed starting Ofev vs. Esbriet but pt decided not to start on any medication.\par Pt started to have migratory joint pains in knees, hands, shoulders that last 1 days in the last 2-3 months. Reports possible swelling of L wrist once. Uses Tylenol PRN for the pains. Pt was prescribed meloxicam but has not started yet because of fear of side effects.\par Pt has had chronic reilly due to enalrged prostate with urinary obstruction.\par Denies other concerning symptoms like rash or other systemic symptoms.\par No family Hx of CTD.\par \par WORKUP: \par Remarkable for (11/2021): Hgb 12.1 (MCV 91.3), CRP 51.2,  \par Normal/neg (11/2021): JOSEP, RMSF, R. Typhi, Q Fever, Tickborne diseases \par Last CT Chest (7/2021): Bibasilar predominant peripheral reticulations, traction bronchiectasis, and honeycombing stable from recent exams, increased from 2018.

## 2021-12-03 NOTE — ASSESSMENT
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 \par Pt has been following with pulmonary for IPF and discussed starting Ofev vs. Esbriet but pt decided not to start on any medication.\par Pt started to have migratory joint pains in knees, hands, shoulders that last 1 days in the last 2-3 months. Reports possible swelling of L wrist once. Uses Tylenol PRN for the pains. Pt was prescribed meloxicam but has not started yet because of fear of side effects.\par Pt has had chronic reilly due to enalrged prostate with urinary obstruction.\par Denies other concerning symptoms like rash or other systemic symptoms.\par No family Hx of CTD.\par Workup with high ESR, CRP. XR of hands/writs with diffuse DIP OA changes. Pt's lung disease has been stable for the last 2 years based on CT scan.\par \par Patient's description of migratory joint pains are not clearly inflammatory and is recent onset. No synovitis on exam today. High ESR, CRP, RF can all be seen in pulmonary fibrosis (and pt with recent UTI which can also elevate ESR and CRP) and is difficult to clearly attribute to underlying inflammatory disease. XR with DIP OA changes with no erosions.\par If there is any contribution of possible RA on pt's UIP lung disease, the treatment would not change. Pt would be considered for treatment with anti-fibrotics such as Ofev and Esbriet and be considered for lung transplant. There would be no indication for immunosuppressants for UIP pattern ILD.\par \par - Obtain bloodwork to evaluate signs of RA\par - Will call pt with results. Pt can take Tylenol PRN, and if kidney function is ok, can take meloxicam PRN as prescribed as well.\par - RTC in 3 months for follow up of labs and symptoms to see if pt develops clearer signs of inflammatory arthritis.\par \par

## 2021-12-04 LAB
ALBUMIN SERPL ELPH-MCNC: 3.9 G/DL
ALP BLD-CCNC: 67 U/L
ALT SERPL-CCNC: 13 U/L
ANION GAP SERPL CALC-SCNC: 16 MMOL/L
AST SERPL-CCNC: 22 U/L
BASOPHILS # BLD AUTO: 0.07 K/UL
BASOPHILS NFR BLD AUTO: 0.6 %
BILIRUB SERPL-MCNC: 0.4 MG/DL
BUN SERPL-MCNC: 35 MG/DL
CALCIUM SERPL-MCNC: 10 MG/DL
CHLORIDE SERPL-SCNC: 96 MMOL/L
CO2 SERPL-SCNC: 20 MMOL/L
CREAT SERPL-MCNC: 1.05 MG/DL
CRP SERPL-MCNC: 71 MG/L
EOSINOPHIL # BLD AUTO: 0.27 K/UL
EOSINOPHIL NFR BLD AUTO: 2.1 %
ERYTHROCYTE [SEDIMENTATION RATE] IN BLOOD BY WESTERGREN METHOD: 63 MM/HR
GLUCOSE SERPL-MCNC: 126 MG/DL
HCT VFR BLD CALC: 39.6 %
HGB BLD-MCNC: 12.6 G/DL
IMM GRANULOCYTES NFR BLD AUTO: 0.4 %
LYMPHOCYTES # BLD AUTO: 0.98 K/UL
LYMPHOCYTES NFR BLD AUTO: 7.8 %
MAN DIFF?: NORMAL
MCHC RBC-ENTMCNC: 31.2 PG
MCHC RBC-ENTMCNC: 31.8 GM/DL
MCV RBC AUTO: 98 FL
MONOCYTES # BLD AUTO: 0.72 K/UL
MONOCYTES NFR BLD AUTO: 5.7 %
NEUTROPHILS # BLD AUTO: 10.53 K/UL
NEUTROPHILS NFR BLD AUTO: 83.4 %
PLATELET # BLD AUTO: 278 K/UL
POTASSIUM SERPL-SCNC: 4.8 MMOL/L
PROT SERPL-MCNC: 8.9 G/DL
RBC # BLD: 4.04 M/UL
RBC # FLD: 14.3 %
RHEUMATOID FACT SER QL: 45 IU/ML
SODIUM SERPL-SCNC: 133 MMOL/L
WBC # FLD AUTO: 12.62 K/UL

## 2021-12-06 ENCOUNTER — NON-APPOINTMENT (OUTPATIENT)
Age: 76
End: 2021-12-06

## 2021-12-06 LAB
CCP AB SER IA-ACNC: >250 UNITS
RF+CCP IGG SER-IMP: ABNORMAL

## 2021-12-08 ENCOUNTER — NON-APPOINTMENT (OUTPATIENT)
Age: 76
End: 2021-12-08

## 2021-12-30 ENCOUNTER — APPOINTMENT (OUTPATIENT)
Dept: SURGERY | Facility: CLINIC | Age: 76
End: 2021-12-30
Payer: MEDICARE

## 2021-12-30 VITALS
RESPIRATION RATE: 18 BRPM | HEIGHT: 70 IN | DIASTOLIC BLOOD PRESSURE: 75 MMHG | OXYGEN SATURATION: 97 % | SYSTOLIC BLOOD PRESSURE: 145 MMHG | HEART RATE: 100 BPM | WEIGHT: 171 LBS | TEMPERATURE: 97.7 F | BODY MASS INDEX: 24.48 KG/M2

## 2021-12-30 DIAGNOSIS — K45.8 OTHER SPECIFIED ABDOMINAL HERNIA W/OUT OBSTRUCTION OR GANGRENE: ICD-10-CM

## 2021-12-30 PROCEDURE — 99212 OFFICE O/P EST SF 10 MIN: CPT

## 2022-01-05 ENCOUNTER — APPOINTMENT (OUTPATIENT)
Dept: CT IMAGING | Facility: CLINIC | Age: 77
End: 2022-01-05
Payer: MEDICARE

## 2022-01-05 ENCOUNTER — OUTPATIENT (OUTPATIENT)
Dept: OUTPATIENT SERVICES | Facility: HOSPITAL | Age: 77
LOS: 1 days | End: 2022-01-05
Payer: MEDICARE

## 2022-01-05 DIAGNOSIS — Z98.89 OTHER SPECIFIED POSTPROCEDURAL STATES: Chronic | ICD-10-CM

## 2022-01-05 DIAGNOSIS — K45.8 OTHER SPECIFIED ABDOMINAL HERNIA WITHOUT OBSTRUCTION OR GANGRENE: ICD-10-CM

## 2022-01-05 DIAGNOSIS — Z87.81 PERSONAL HISTORY OF (HEALED) TRAUMATIC FRACTURE: Chronic | ICD-10-CM

## 2022-01-05 PROCEDURE — 74177 CT ABD & PELVIS W/CONTRAST: CPT | Mod: 26,ME

## 2022-01-05 PROCEDURE — G1004: CPT

## 2022-01-05 PROCEDURE — 71260 CT THORAX DX C+: CPT | Mod: 26,MH

## 2022-01-05 PROCEDURE — 74177 CT ABD & PELVIS W/CONTRAST: CPT | Mod: ME

## 2022-01-05 PROCEDURE — 71260 CT THORAX DX C+: CPT | Mod: MH

## 2022-01-14 NOTE — ED ADULT NURSE NOTE - NS ED NURSE LEVEL OF CONSCIOUSNESS SPEECH
From: Connie Harrell  To: Issa Gonzales  Sent: 1/14/2022 4:06 PM CST  Subject: UTI    Hello Dr Gonzales, I’m sending you a message because about a week ago I had a yeast infection and now I know I have a uti infection. May I please be put on a strong antibiotic. I have pain when I go to the bathroom and lower back pain on my right side. Thank you, Connie    Speaking Coherently

## 2022-01-16 ENCOUNTER — NON-APPOINTMENT (OUTPATIENT)
Age: 77
End: 2022-01-16

## 2022-02-06 ENCOUNTER — EMERGENCY (EMERGENCY)
Facility: HOSPITAL | Age: 77
LOS: 1 days | Discharge: HOME | End: 2022-02-06
Attending: EMERGENCY MEDICINE
Payer: MEDICARE

## 2022-02-06 ENCOUNTER — EMERGENCY (EMERGENCY)
Facility: HOSPITAL | Age: 77
LOS: 1 days | Discharge: HOME | End: 2022-02-06
Attending: STUDENT IN AN ORGANIZED HEALTH CARE EDUCATION/TRAINING PROGRAM
Payer: MEDICARE

## 2022-02-06 VITALS
SYSTOLIC BLOOD PRESSURE: 106 MMHG | WEIGHT: 167.99 LBS | RESPIRATION RATE: 24 BRPM | DIASTOLIC BLOOD PRESSURE: 63 MMHG | OXYGEN SATURATION: 91 % | HEART RATE: 107 BPM | TEMPERATURE: 98 F | HEIGHT: 71 IN

## 2022-02-06 VITALS
HEIGHT: 71 IN | DIASTOLIC BLOOD PRESSURE: 72 MMHG | SYSTOLIC BLOOD PRESSURE: 127 MMHG | OXYGEN SATURATION: 98 % | HEART RATE: 94 BPM | TEMPERATURE: 98 F | RESPIRATION RATE: 18 BRPM

## 2022-02-06 DIAGNOSIS — Z98.89 OTHER SPECIFIED POSTPROCEDURAL STATES: Chronic | ICD-10-CM

## 2022-02-06 DIAGNOSIS — Z87.81 PERSONAL HISTORY OF (HEALED) TRAUMATIC FRACTURE: Chronic | ICD-10-CM

## 2022-02-06 PROCEDURE — 99283 EMERGENCY DEPT VISIT LOW MDM: CPT

## 2022-02-06 PROCEDURE — 76857 US EXAM PELVIC LIMITED: CPT | Mod: 26

## 2022-02-06 PROCEDURE — 99282 EMERGENCY DEPT VISIT SF MDM: CPT

## 2022-02-06 PROCEDURE — 99284 EMERGENCY DEPT VISIT MOD MDM: CPT | Mod: 25

## 2022-02-06 NOTE — ED PROVIDER NOTE - NS_EDPROVIDERDISPOUSERTYPE_ED_A_ED
Attending Attestation (For Attendings USE Only)...

## 2022-02-06 NOTE — ED PROVIDER NOTE - OBJECTIVE STATEMENT
Pt is a 77 yo M co reilly not draining. Pt states that  he was here early this morning for a similar issue and his reilly was flushed and was draining but now he states t hat his bladder feels full and is concerned that he is obstructed again. no other complaints.

## 2022-02-06 NOTE — ED PROVIDER NOTE - CARE PROVIDERS DIRECT ADDRESSES
[email or web address]

## 2022-02-06 NOTE — ED PROVIDER NOTE - PATIENT PORTAL LINK FT
You can access the FollowMyHealth Patient Portal offered by [facility] by registering at the following website: [email or web address]. By joining [employer]’s FollowMyHealth portal, you will also be able to view your health information using other applications (apps) compatible with our system.

## 2022-02-06 NOTE — ED PROVIDER NOTE - CLINICAL SUMMARY MEDICAL DECISION MAKING FREE TEXT BOX
Ortiz clogged, irrigated and now draining clear yellow urine with relief of symptoms. Will refer to urology for follow up.

## 2022-02-06 NOTE — ED ADULT TRIAGE NOTE - CHIEF COMPLAINT QUOTE
Pt. BIBA for urinary retention for about 6 hours. Pt. states this has happened a few times this week, catheter was changed on Wednesday. Pt. states it drains when he stands up. Pt. denies any Blood , only a little pressure in abdomen. Pt. on 2LNC at home.

## 2022-02-06 NOTE — ED PROVIDER NOTE - CARE PROVIDER_API CALL
Mikal Jarvis  Specialist  [address] [place] [address]  Phone: ([phone]  Fax: ([phone]  Follow Up Time: 1-3 Days

## 2022-02-06 NOTE — ED ADULT NURSE NOTE - OBJECTIVE STATEMENT
Patient presents to the ED c/o urinary retention.  Patient with chronic reilly, has enlarged prostate.  Patient states that he was seen by urology on wednesday in which the reilly catheter was changed.  occasionally urine does not drain from reilly.  Patient noticed lack of urine output in bag.  Patient denies hematuria, fevers and chills.

## 2022-02-06 NOTE — ED PROVIDER NOTE - PATIENT PORTAL LINK FT
You can access the FollowMyHealth Patient Portal offered by [facility] by registering at the following website: [email or web address]. By joining [employer]’s FollowMyHealth portal, you will also be able to view your health information using other applications (apps) compatible with our system.

## 2022-02-06 NOTE — ED PROVIDER NOTE - CLINICAL SUMMARY MEDICAL DECISION MAKING FREE TEXT BOX
Bedside Us showed reilly bulb in place and no urine in bladder.   Flushed with 150 cc's of sterile water and US showed bladder distended with some sediment.  All 150 cc's of water then drained.  will d/c with instructions to f/up with his doctor in 1-2 days.

## 2022-02-06 NOTE — ED PROVIDER NOTE - NSFOLLOWUPINSTRUCTIONS_ED_ALL_ED_FT
Pt had reilly checked in ER.  Bedside US showed empty bladder.  150 cc's of sterile water instilled and there was some sediment on US and bladder became distended.  All sterile water was then drained.

## 2022-02-06 NOTE — ED PROVIDER NOTE - OBJECTIVE STATEMENT
76yom with chronic reilly, last changed 4 days ago by urology, complains of suprapubic pressure and lack of urine output in bag. No fevers, chills, hematuria.

## 2022-02-06 NOTE — ED ADULT TRIAGE NOTE - HEIGHT IN INCHES
2/10/20  [facility] : 1961 Sex: female  Age: 63 y.o. Chief Complaint   Patient presents with    Urinary Tract Infection     x 4 days        HPI: The patient states that they have had dysuria and urinary frequency for the last 4 days. The patient does admit to suprapubic pressure. The patient has had urgency but denies gross hematuria. The patient denies any abdominal or flank pain. The patient denies any abnormal nausea and vomiting. No fevers or chills. No prior history of kidney stones. The patient has a history of UTI's and states that this feels the same. They come to the urgent care for evaluation. ROS:  Const: Positives and pertinent negatives as per HPI. All others reviewed and are negative.         Current Outpatient Medications:     fosfomycin tromethamine (MONUROL) 3 g PACK, Take 1 packet by mouth once for 1 dose, Disp: 1 each, Rfl: 0    sulconazole (EXELDERM) 1 % cream, Apply topically bid, Disp: 1 Tube, Rfl: 12    oxiconazole nitrate (OXISTAT) 1 % CREA cream, Bid to breast fold, Disp: 1 Tube, Rfl: 12    metoprolol tartrate (LOPRESSOR) 25 MG tablet, Take 1 tablet by mouth 2 times daily, Disp: 180 tablet, Rfl: 5    traZODone (DESYREL) 50 MG tablet, 2 q hs (Patient taking differently: 40 mg 2 q hs), Disp: 180 tablet, Rfl: 5    potassium chloride (KLOR-CON M) 20 MEQ extended release tablet, Take 1 tablet by mouth 2 times daily, Disp: 180 tablet, Rfl: 5    escitalopram (LEXAPRO) 20 MG tablet, Take 0.5 tablets by mouth daily (Patient taking differently: Take 40 mg by mouth daily ), Disp: 90 tablet, Rfl: 5    levothyroxine (SYNTHROID) 137 MCG tablet, Take 1 tablet by mouth daily, Disp: 90 tablet, Rfl: 12    losartan (COZAAR) 100 MG tablet, Take 1 tablet by mouth daily, Disp: 90 tablet, Rfl: 5    METHOTREXATE SODIUM IJ, Infuse 22.5 mg intravenously once a week On  for RA, Disp: , Rfl:     inFLIXimab (REMICADE) 100 MG injection, Infuse 800 mg intravenously See Admin Instructions Indications: 800 MG every 5 weeks Over 2 hours every 8 weeks , Disp: , Rfl:     Probiotic Product (PROBIOTIC DAILY PO), Take 1 capsule by mouth daily, Disp: , Rfl:     Methotrexate, Anti-Rheumatic, (METHOTREXATE, PF, SC), Inject 1 mL into the skin once a week, Disp: , Rfl:     magnesium (MAGNESIUM-OXIDE) 250 MG TABS tablet, Take 250 mg by mouth daily, Disp: , Rfl:     Cholecalciferol (VITAMIN D3) 2000 UNITS CAPS, Take 3,000 Int'l Units by mouth daily. , Disp: , Rfl:     SUMAtriptan (IMITREX) 100 MG tablet, Take 1 tablet by mouth once as needed for Migraine (1 prn headache . may repeat two hours later.  max 2 per day and 6 per week), Disp: 9 tablet, Rfl: 12  Allergies   Allergen Reactions    Amoxicillin Rash    Amoxicillin-Pot Clavulanate Rash    Biaxin [Clarithromycin] Hives and Rash    Ceclor [Cefaclor] Hives and Rash    Clavulanic Acid Rash    Doxycycline Rash    Macrobid [Nitrofurantoin] Nausea And Vomiting       Past Medical History:   Diagnosis Date    Altered bowel elimination due to intestinal ostomy ([facility] [address]     Anemia     Arthritis     Crohn disease (HCC)     Fatty liver     Hernia     History of DVT (deep vein thrombosis) 6/10/2015    History of pulmonary embolus (PE) 6/10/2015    Hyperlipemia     Hypertension     Hypothyroidism     Intervertebral lumbar disc disorder with myelopathy, lumbar region     Leukocytosis     Movement disorder     MRSA infection     UTI    Obesity     Osteoarthritis of both knees     Palpitations     PE (pulmonary embolism)     Rash     fine skin rash not itchy gastro dr aware    Syncope     Thyroid disease     Uterine fibroid     Vitamin D deficiency      Past Surgical History:   Procedure Laterality Date    ABDOMEN SURGERY  11/2/14    I&d abdominal wound    CARPAL TUNNEL RELEASE      CHOLECYSTECTOMY      COLECTOMY      ostomy    COLECTOMY      COLONOSCOPY      ENDOMETRIAL ABLATION      GASTROSTOMY TUBE PLACEMENT      HERNIA REPAIR      ILEOSTOMY OR JEJUNOSTOMY      placed 2/2015 reversed 9/2015    JOINT REPLACEMENT  11/2013    both knees    TONSILLECTOMY      TOTAL KNEE ARTHROPLASTY Bilateral 2013    TUBAL LIGATION      TUNNELED VENOUS CATHETER PLACEMENT       Family History   Problem Relation Age of Onset    Hypertension Mother      Social History     Socioeconomic History    Marital status:      Spouse name: Not on file    Number of children: Not on file    Years of education: Not on file    Highest education level: Not on file   Occupational History     Employer: [employer]   Social Needs    Financial resource strain: Not on file    Food insecurity:     Worry: Not on file     Inability: Not on file    Transportation needs:     Medical: Not on file     Non-medical: Not on file   Tobacco Use    Smoking status: Never Smoker    Smokeless tobacco: Never Used   Substance and Sexual Activity    Alcohol use: No    Drug use: No    Sexual activity: Not on file   Lifestyle    Physical activity:     Days per week: Not on file     Minutes per session: Not on file    Stress: Not on file   Relationships    Social connections:     Talks on phone: Not on file     Gets together: Not on file     Attends Buddhist service: Not on file     Active member of club or organization: Not on file     Attends meetings of clubs or organizations: Not on file     Relationship status: Not on file    Intimate partner violence:     Fear of current or ex partner: Not on file     Emotionally abused: Not on file     Physically abused: Not on file     Forced sexual activity: Not on file   Other Topics Concern    Not on file   Social History Narrative        Tetanus Immunization - (8/14/2017)    HYPOTHYROID    HTN    ELEV LFT    FATTY LIVER    S/P UMBILICAL HERNIA OR AND SUBSEQUEST INCISIONAL HERNIA OR 8-06    LEUKOCYTOSIS JEMMA    CTS NO OR    OA KNEES SYNVISC----DR ODONNELL    ALLERGY TO DISSOLVABLE SUTURES    UTERINE FIBROID    ----OSP---[facility]---[facility]--3    [employer]    OBESITY    UTERINE ABLATION    LOW VIT D 7-10    ECHO 7 -10 STAGE ONE SYED DYSFX    PALP---DR TERRY    MILD OCCLUSIVE DIS L ARM--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     5-12----DIV 5-13    COLON 1-13 WITH YURICH---TOLD ULCERATIVE COLITIS--NO HELP WITH ASACOL AND BX NEG    DC WTIH INFL BOWEL DIS (CROHNS) AND JOINT SWELLING---REGULE IV STEROIDS---DC ON    PRED AND ANEMIA    STARTED HUMIRA 5-13    BILATERAL TOTAL KNEE DR ODONNELL 11-13    ADMIT 3-14 WITH FLARE OF CROHNS DIS    ADMIT WITH BRONCHITIS 4-14    FLARE CROHNS DIS    COLONOSCOPY 10-14 [facility]----ACTIVE CROHNS COLITIS IN SIGMOID    ADMIT 11-14 WITH VENTRAL WALL ABSCESS AND POSS FISTULA    SYNCOPE 11-14 WITH LACERATION SCALP    OR [facility] 2-11-15---- FOR PARTIAL BOWEL RESECTION--- PEG TUBE IN----DUE TO INFECTED    MESH    admit 5-15 with UTI SEPSIS    ILEOSTOMY REVERSAL 9-15 [facility]    MRSA UTI 10-15    START REMICADE 12-15    eval dr Del Toro [facility]  3-19 with shots back   Mri with 5 herniated disc       Vitals:    02/10/20 1023   BP: 122/70   Pulse: 90   Resp: 14   Temp: 99.9 °F (37.7 °C)   SpO2: 99%   Weight: 262 lb (118.8 kg)   Height: 5' 3\" (1.6 m)       Exam:  Physical Exam  Constitutional:       Appearance: She is well-developed. HENT:      Head: Normocephalic. Eyes:      Extraocular Movements: Extraocular movements intact. Conjunctiva/sclera: Conjunctivae normal.   Cardiovascular:      Rate and Rhythm: Normal rate and regular rhythm. Heart sounds: Normal heart sounds. No murmur. Pulmonary:      Effort: Pulmonary effort is normal.      Breath sounds: Normal breath sounds. No wheezing or rales. Abdominal:      General: Bowel sounds are normal.      Palpations: Abdomen is soft. Tenderness: There is no abdominal tenderness. Neurological:      Mental Status: She is alert.       Comments: Cranial nerves grossly intact         Assessment and Plan:   Jennie Reynolds was seen today for urinary tract infection. Diagnoses and all orders for this visit:    Urinary tract infection with hematuria, site unspecified  -     POCT Urinalysis No Micro (Auto)  -     Urine Culture; Future  -     fosfomycin tromethamine (MONUROL) 3 g PACK; Take 1 packet by mouth once for 1 dose    + UA. Send Cx. Multiple allergies and med interactions. Abx as noted above. Patient advised if she cant get above abx to call her urologist or proceed to the ER. Return in about 1 week (around 2/17/2020), or if symptoms worsen or fail to improve. The above treatment regimen was discussed with the patient at length and all questions in regards to such were answered. Common side effect of antibiotics were briefly discussed. Patient was advised to proceed to the emergency department with any worsening symptoms. Patient should follow up with her family doctor within 3-5 days.     Siria Zelaya MD 11

## 2022-02-06 NOTE — ED ADULT TRIAGE NOTE - CHIEF COMPLAINT QUOTE
Pt states he was here this AM for catheter malfunction.  Pt back for same reason.  Small amount of urine in urine bag.  Pt is O2 dependent for PF, 2LNC.

## 2022-03-04 ENCOUNTER — APPOINTMENT (OUTPATIENT)
Dept: RHEUMATOLOGY | Facility: CLINIC | Age: 77
End: 2022-03-04

## 2022-03-27 ENCOUNTER — EMERGENCY (EMERGENCY)
Facility: HOSPITAL | Age: 77
LOS: 1 days | Discharge: HOME | End: 2022-03-27
Attending: STUDENT IN AN ORGANIZED HEALTH CARE EDUCATION/TRAINING PROGRAM
Payer: MEDICARE

## 2022-03-27 VITALS
RESPIRATION RATE: 19 BRPM | OXYGEN SATURATION: 95 % | HEART RATE: 88 BPM | TEMPERATURE: 98 F | SYSTOLIC BLOOD PRESSURE: 156 MMHG | DIASTOLIC BLOOD PRESSURE: 76 MMHG

## 2022-03-27 VITALS
OXYGEN SATURATION: 99 % | TEMPERATURE: 98 F | DIASTOLIC BLOOD PRESSURE: 90 MMHG | SYSTOLIC BLOOD PRESSURE: 123 MMHG | HEART RATE: 83 BPM | RESPIRATION RATE: 17 BRPM | HEIGHT: 71 IN | WEIGHT: 160.06 LBS

## 2022-03-27 DIAGNOSIS — Z87.81 PERSONAL HISTORY OF (HEALED) TRAUMATIC FRACTURE: Chronic | ICD-10-CM

## 2022-03-27 DIAGNOSIS — Z98.89 OTHER SPECIFIED POSTPROCEDURAL STATES: Chronic | ICD-10-CM

## 2022-03-27 LAB
ALBUMIN SERPL ELPH-MCNC: 3.1 G/DL — LOW (ref 3.3–5.2)
ALP SERPL-CCNC: 59 U/L — SIGNIFICANT CHANGE UP (ref 40–120)
ALT FLD-CCNC: 10 U/L — SIGNIFICANT CHANGE UP
ANION GAP SERPL CALC-SCNC: 12 MMOL/L — SIGNIFICANT CHANGE UP (ref 5–17)
AST SERPL-CCNC: 17 U/L — SIGNIFICANT CHANGE UP
BASOPHILS # BLD AUTO: 0.05 K/UL — SIGNIFICANT CHANGE UP (ref 0–0.2)
BASOPHILS NFR BLD AUTO: 0.6 % — SIGNIFICANT CHANGE UP (ref 0–2)
BILIRUB SERPL-MCNC: 0.5 MG/DL — SIGNIFICANT CHANGE UP (ref 0.4–2)
BUN SERPL-MCNC: 17.6 MG/DL — SIGNIFICANT CHANGE UP (ref 8–20)
CALCIUM SERPL-MCNC: 9.3 MG/DL — SIGNIFICANT CHANGE UP (ref 8.6–10.2)
CHLORIDE SERPL-SCNC: 97 MMOL/L — LOW (ref 98–107)
CO2 SERPL-SCNC: 25 MMOL/L — SIGNIFICANT CHANGE UP (ref 22–29)
CREAT SERPL-MCNC: 0.77 MG/DL — SIGNIFICANT CHANGE UP (ref 0.5–1.3)
EGFR: 93 ML/MIN/1.73M2 — SIGNIFICANT CHANGE UP
EOSINOPHIL # BLD AUTO: 0.08 K/UL — SIGNIFICANT CHANGE UP (ref 0–0.5)
EOSINOPHIL NFR BLD AUTO: 0.9 % — SIGNIFICANT CHANGE UP (ref 0–6)
GLUCOSE SERPL-MCNC: 116 MG/DL — HIGH (ref 70–99)
HCT VFR BLD CALC: 35.6 % — LOW (ref 39–50)
HGB BLD-MCNC: 11.7 G/DL — LOW (ref 13–17)
IMM GRANULOCYTES NFR BLD AUTO: 0.6 % — SIGNIFICANT CHANGE UP (ref 0–1.5)
LIDOCAIN IGE QN: 33 U/L — SIGNIFICANT CHANGE UP (ref 22–51)
LYMPHOCYTES # BLD AUTO: 0.81 K/UL — LOW (ref 1–3.3)
LYMPHOCYTES # BLD AUTO: 9.4 % — LOW (ref 13–44)
MCHC RBC-ENTMCNC: 30 PG — SIGNIFICANT CHANGE UP (ref 27–34)
MCHC RBC-ENTMCNC: 32.9 GM/DL — SIGNIFICANT CHANGE UP (ref 32–36)
MCV RBC AUTO: 91.3 FL — SIGNIFICANT CHANGE UP (ref 80–100)
MONOCYTES # BLD AUTO: 0.4 K/UL — SIGNIFICANT CHANGE UP (ref 0–0.9)
MONOCYTES NFR BLD AUTO: 4.6 % — SIGNIFICANT CHANGE UP (ref 2–14)
NEUTROPHILS # BLD AUTO: 7.26 K/UL — SIGNIFICANT CHANGE UP (ref 1.8–7.4)
NEUTROPHILS NFR BLD AUTO: 83.9 % — HIGH (ref 43–77)
PLATELET # BLD AUTO: 301 K/UL — SIGNIFICANT CHANGE UP (ref 150–400)
POTASSIUM SERPL-MCNC: 4.3 MMOL/L — SIGNIFICANT CHANGE UP (ref 3.5–5.3)
POTASSIUM SERPL-SCNC: 4.3 MMOL/L — SIGNIFICANT CHANGE UP (ref 3.5–5.3)
PROT SERPL-MCNC: 8.1 G/DL — SIGNIFICANT CHANGE UP (ref 6.6–8.7)
RBC # BLD: 3.9 M/UL — LOW (ref 4.2–5.8)
RBC # FLD: 14.4 % — SIGNIFICANT CHANGE UP (ref 10.3–14.5)
SODIUM SERPL-SCNC: 134 MMOL/L — LOW (ref 135–145)
TROPONIN T SERPL-MCNC: <0.01 NG/ML — SIGNIFICANT CHANGE UP (ref 0–0.06)
WBC # BLD: 8.65 K/UL — SIGNIFICANT CHANGE UP (ref 3.8–10.5)
WBC # FLD AUTO: 8.65 K/UL — SIGNIFICANT CHANGE UP (ref 3.8–10.5)

## 2022-03-27 PROCEDURE — 99284 EMERGENCY DEPT VISIT MOD MDM: CPT | Mod: GC

## 2022-03-27 PROCEDURE — 71045 X-RAY EXAM CHEST 1 VIEW: CPT

## 2022-03-27 PROCEDURE — 84484 ASSAY OF TROPONIN QUANT: CPT

## 2022-03-27 PROCEDURE — 85025 COMPLETE CBC W/AUTO DIFF WBC: CPT

## 2022-03-27 PROCEDURE — 93005 ELECTROCARDIOGRAM TRACING: CPT

## 2022-03-27 PROCEDURE — 71045 X-RAY EXAM CHEST 1 VIEW: CPT | Mod: 26

## 2022-03-27 PROCEDURE — 36415 COLL VENOUS BLD VENIPUNCTURE: CPT

## 2022-03-27 PROCEDURE — 93010 ELECTROCARDIOGRAM REPORT: CPT

## 2022-03-27 PROCEDURE — 74177 CT ABD & PELVIS W/CONTRAST: CPT | Mod: MA

## 2022-03-27 PROCEDURE — 74177 CT ABD & PELVIS W/CONTRAST: CPT | Mod: 26,MA

## 2022-03-27 PROCEDURE — 99285 EMERGENCY DEPT VISIT HI MDM: CPT | Mod: 25

## 2022-03-27 PROCEDURE — 82962 GLUCOSE BLOOD TEST: CPT

## 2022-03-27 PROCEDURE — 80053 COMPREHEN METABOLIC PANEL: CPT

## 2022-03-27 PROCEDURE — 83690 ASSAY OF LIPASE: CPT

## 2022-03-27 RX ORDER — IOHEXOL 300 MG/ML
30 INJECTION, SOLUTION INTRAVENOUS ONCE
Refills: 0 | Status: COMPLETED | OUTPATIENT
Start: 2022-03-27 | End: 2022-03-27

## 2022-03-27 RX ORDER — SODIUM CHLORIDE 9 MG/ML
1000 INJECTION INTRAMUSCULAR; INTRAVENOUS; SUBCUTANEOUS ONCE
Refills: 0 | Status: COMPLETED | OUTPATIENT
Start: 2022-03-27 | End: 2022-03-27

## 2022-03-27 RX ADMIN — SODIUM CHLORIDE 1000 MILLILITER(S): 9 INJECTION INTRAMUSCULAR; INTRAVENOUS; SUBCUTANEOUS at 12:00

## 2022-03-27 RX ADMIN — IOHEXOL 30 MILLILITER(S): 300 INJECTION, SOLUTION INTRAVENOUS at 12:04

## 2022-03-27 NOTE — ED PROVIDER NOTE - ATTENDING CONTRIBUTION TO CARE
77 y/o M with PMHx BPH, HTN, asthma, pulmonary fibrosis on 2 L home O2, who presents to the ED with abdominal pain for lower abd pain x2 days. Vomited 2 days ago but has not since then. Denies fever, chills, cp, worsening sob, back pain, dysuria, problem with reilly. Reports has reilly changed every 4 weeks, is due for change in 2 days with urology Dr. Clark? PSH ex-lap, appendectomy.   Ap - well appearing, diffusely tender. will get labs, CTAP to eval for obstruction.

## 2022-03-27 NOTE — ED PROVIDER NOTE - NS ED ROS FT
Constitutional: no fever, no chills  Head: NC, AT   Eyes: no redness   ENMT: no nasal congestion/drainage, no sore throat   CV: no chest pain, no edema  Resp: no cough, no dyspnea  GI: + abdominal pain, no nausea, no vomiting, no diarrhea  : no hematuria   Skin: no lesions, no rashes   Neuro: no LOC, no headache, no sensory deficits

## 2022-03-27 NOTE — ED PROVIDER NOTE - OBJECTIVE STATEMENT
75 y/o M with PMHx BPH, HTN, asthma, pulmonary fibrosis on 2 L home O2, who presents to the ED with abdominal pain for the past two days. Patient states that the pain is in his b/l LQs. States that it is constant, feels like a pressure, and rates it at a 3/10. Denies any aggravating or alleviating factors. States that his last BM was this morning and that he is still passing gas. States that he had an ex lap for hernia repair last August here. States that he has also has his appendix removed. Denies any fevers, chills, chest pain, shortness of breath. 77 y/o M with PMHx BPH, HTN, asthma, pulmonary fibrosis on 2 L home O2, who presents to the ED with abdominal pain for the past two days. Patient states that the pain is in his b/l LQs. States that it is constant, feels like a pressure, and rates it at a 3/10. Denies any aggravating or alleviating factors. States that his last BM was this morning and that he is still passing gas. States that he had an ex lap for hernia repair last August here. States that he has also has his appendix removed. Denies any fevers, chills, chest pain, shortness of breath, or NVD. Endorses some fatigue, however denies any other complaints.

## 2022-03-27 NOTE — ED ADULT NURSE NOTE - SUICIDE SCREENING QUESTION 2
Health Maintenance Due   Topic Date Due    TETANUS VACCINE  Never done    Diabetes Urine Screening  08/30/2019     Updates were requested from care everywhere.  Chart was reviewed for overdue Proactive [facility] Encounters (RU) topics (CRS, Breast Cancer Screening, Eye exam)  Health Maintenance has been updated.  LINKS immunization registry triggered.  Immunizations were reconciled.       No

## 2022-03-27 NOTE — ED PROVIDER NOTE - PATIENT PORTAL LINK FT
You can access the FollowMyHealth Patient Portal offered by [facility] by registering at the following website: [email or web address]. By joining [employer]’s FollowMyHealth portal, you will also be able to view your health information using other applications (apps) compatible with our system.

## 2022-03-27 NOTE — ED PROVIDER NOTE - PROGRESS NOTE DETAILS
Labs unremarkable. CT scan unimpressive. Patient comfortable and in no distress. VSS. Offered to change patient's Ortiz for UA collection, patient declined. Stable for d/c with PMD f/u. - Jazmine PGY1

## 2022-03-27 NOTE — ED PROVIDER NOTE - PHYSICAL EXAMINATION
General: well appearing, NAD  Head: NC, AT  EENT: no scleral icterus  Cardiac: RRR, no apparent murmurs, no lower extremity edema  Respiratory: CTABL, no respiratory distress   Abdomen: soft, ND, b/l LQ tenderness, nonperitonitic  MSK/Vascular: soft compartments, warm extremities  Neuro: AAOx3, sensation to light touch intact  Psych: calm, cooperative

## 2022-03-27 NOTE — ED PROVIDER NOTE - CLINICAL SUMMARY MEDICAL DECISION MAKING FREE TEXT BOX
77 y/o M with PMHx BPH, HTN, asthma, pulmonary fibrosis on 2 L home O2, who presents to the ED with abdominal pain for the past two days. Labs, trop, EKG, CXR, IVFs, CT ab/pel with IV and PO contrast.

## 2022-03-29 ENCOUNTER — EMERGENCY (EMERGENCY)
Facility: HOSPITAL | Age: 77
LOS: 1 days | Discharge: HOME | End: 2022-03-29
Attending: EMERGENCY MEDICINE
Payer: MEDICARE

## 2022-03-29 VITALS
WEIGHT: 166.89 LBS | HEIGHT: 71 IN | RESPIRATION RATE: 18 BRPM | OXYGEN SATURATION: 95 % | TEMPERATURE: 98 F | DIASTOLIC BLOOD PRESSURE: 76 MMHG | HEART RATE: 89 BPM | SYSTOLIC BLOOD PRESSURE: 139 MMHG

## 2022-03-29 DIAGNOSIS — Z87.81 PERSONAL HISTORY OF (HEALED) TRAUMATIC FRACTURE: Chronic | ICD-10-CM

## 2022-03-29 DIAGNOSIS — Z98.89 OTHER SPECIFIED POSTPROCEDURAL STATES: Chronic | ICD-10-CM

## 2022-03-29 LAB
APPEARANCE UR: ABNORMAL
BACTERIA # UR AUTO: ABNORMAL
BILIRUB UR-MCNC: NEGATIVE — SIGNIFICANT CHANGE UP
COLOR SPEC: YELLOW — SIGNIFICANT CHANGE UP
DIFF PNL FLD: ABNORMAL
EPI CELLS # UR: SIGNIFICANT CHANGE UP
GLUCOSE UR QL: NEGATIVE MG/DL — SIGNIFICANT CHANGE UP
HYALINE CASTS # UR AUTO: ABNORMAL /LPF
KETONES UR-MCNC: NEGATIVE — SIGNIFICANT CHANGE UP
LEUKOCYTE ESTERASE UR-ACNC: ABNORMAL
NITRITE UR-MCNC: NEGATIVE — SIGNIFICANT CHANGE UP
PH UR: 6.5 — SIGNIFICANT CHANGE UP (ref 5–8)
PROT UR-MCNC: 30 MG/DL
RBC CASTS # UR COMP ASSIST: ABNORMAL /HPF (ref 0–4)
SP GR SPEC: 1.01 — SIGNIFICANT CHANGE UP (ref 1.01–1.02)
UROBILINOGEN FLD QL: NEGATIVE MG/DL — SIGNIFICANT CHANGE UP
WBC UR QL: ABNORMAL /HPF (ref 0–5)

## 2022-03-29 PROCEDURE — 99283 EMERGENCY DEPT VISIT LOW MDM: CPT

## 2022-03-29 PROCEDURE — 81001 URINALYSIS AUTO W/SCOPE: CPT

## 2022-03-29 PROCEDURE — 87086 URINE CULTURE/COLONY COUNT: CPT

## 2022-03-29 PROCEDURE — 87077 CULTURE AEROBIC IDENTIFY: CPT

## 2022-03-29 PROCEDURE — 87186 SC STD MICRODIL/AGAR DIL: CPT

## 2022-03-29 PROCEDURE — 99283 EMERGENCY DEPT VISIT LOW MDM: CPT | Mod: FS

## 2022-03-29 RX ORDER — CEFPODOXIME PROXETIL 100 MG
200 TABLET ORAL EVERY 12 HOURS
Refills: 0 | Status: DISCONTINUED | OUTPATIENT
Start: 2022-03-29 | End: 2022-04-02

## 2022-03-29 RX ORDER — CEFPODOXIME PROXETIL 100 MG
1 TABLET ORAL
Qty: 20 | Refills: 0
Start: 2022-03-29 | End: 2022-04-07

## 2022-03-29 NOTE — ED ADULT NURSE NOTE - CHIEF COMPLAINT QUOTE
pt comes in because he wants his reilly to be changed and check for urine infection, pt was D/C from [facility] 2 days ago, have an appointment with urologist today at 8am, pt states he prefer to come to the emergency room because it will be quicker than his urologist office, pt states "im having pain on my kidneys"  pt has pulmonary fibrosis on home o2 3L

## 2022-03-29 NOTE — ED PROVIDER NOTE - PHYSICAL EXAMINATION
Const: Awake, alert and oriented. In no acute distress. Well appearing.  HEENT: NC/AT. Moist mucous membranes.  Eyes: No scleral icterus. EOMI.  Neck:. Soft and supple. Full ROM without pain.  Cardiac: +S1/S2. No murmurs. Peripheral pulses 2+ and symmetric. No LE edema.  Resp: Speaking in full sentences. No evidence of respiratory distress. No wheezes, rales or rhonchi.  gu: Ortiz in place noted, no leaking or malfunction   Abd: Soft, non-tender, non-distended. Normal bowel sounds in all 4 quadrants. No guarding or rebound.  Back: Spine midline and non-tender. No CVAT.  Skin: No rashes, abrasions or lacerations.  Lymph: No cervical lymphadenopathy.  Neuro: Awake, alert & oriented x 3. Moves all extremities symmetrically.

## 2022-03-29 NOTE — ED ADULT TRIAGE NOTE - CHIEF COMPLAINT QUOTE
pt comes in because he wants his reilly to be changed and check for urine infection, pt was D/C from [facility] 2 days ago, have an appointment with urologist today at 8am, pt states he prefer to come to the emergency room because it will be quicker than his urologist office, pt states "im having pain on my kidneys"  pt has pulmonary fibrosis on home o2 3L

## 2022-03-29 NOTE — ED PROVIDER NOTE - ATTENDING CONTRIBUTION TO CARE
I personally saw the patient with the PA, and completed the key components of the history and physical exam. I then discussed the management plan with the PA.   gen in nad resp clear cardiac no murmur abd soft nt no cvat neuro intact

## 2022-03-29 NOTE — ED PROVIDER NOTE - NSFOLLOWUPINSTRUCTIONS_ED_ALL_ED_FT
take antibiotics as prescribed   follow up with urology outpatient  reilly care explained to patient

## 2022-03-29 NOTE — ED PROVIDER NOTE - PATIENT PORTAL LINK FT
You can access the FollowMyHealth Patient Portal offered by [facility] by registering at the following website: [email or web address]. By joining [employer]’s FollowMyHealth portal, you will also be able to view your health information using other applications (apps) compatible with our system.

## 2022-03-29 NOTE — ED ADULT NURSE NOTE - OBJECTIVE STATEMENT
Pt reports that he was recently d/c with urinary catheter from hospital about two days ago. Complains of burning pain at tip of penis as well as pain that extends bilaterally to flank regions. Reports that he is producing thick green mucus. Denies Chest pain. On oxygen 2L at home

## 2022-03-29 NOTE — ED PROVIDER NOTE - OBJECTIVE STATEMENT
pt is a 75 y/o male with a pmhx of HTN, BPH, presenting to the ed for evaluation. pt states he was seen at [facility] on march 27th, for lower abdominal pain, ct scan showed possible cystitis, but patient declined having urine collected and reilly changed. pt with reilly due to BPH follows up with dr barnhart. pt states last reilly placed four weeks ago. pt denies cp sob fever cough nausea vomiting back pain testicular pain hematuria

## 2022-03-29 NOTE — ED ADULT NURSE NOTE - NS ED NURSE RECORD ANOTHER HT AND WT
Caller: Summer Condon Jr.    Relationship: Self    Best call back number: [phone]    Medication needed:   Requested Prescriptions     Pending Prescriptions Disp Refills   • Accu-Chek Guide test strip 100 each 11     Si each by Other route Daily.       When do you need the refill by: WHEN POSSIBLE    Does the patient have less than a 3 day supply:  [] Yes  [x] No    What is the patient's preferred pharmacy: [address] ([facility]), [address] [facility] [phone] [facility] [phone]       Yes

## 2022-03-29 NOTE — ED PROVIDER NOTE - NS ED ATTENDING STATEMENT MOD
This was a shared visit with the JUDI. I reviewed and verified the documentation and independently performed the documented:

## 2022-04-04 ENCOUNTER — NON-APPOINTMENT (OUTPATIENT)
Age: 77
End: 2022-04-04

## 2022-04-06 ENCOUNTER — APPOINTMENT (OUTPATIENT)
Dept: PULMONOLOGY | Facility: CLINIC | Age: 77
End: 2022-04-06
Payer: MEDICARE

## 2022-04-06 VITALS
HEART RATE: 82 BPM | DIASTOLIC BLOOD PRESSURE: 68 MMHG | WEIGHT: 150 LBS | SYSTOLIC BLOOD PRESSURE: 132 MMHG | BODY MASS INDEX: 21.52 KG/M2 | OXYGEN SATURATION: 97 %

## 2022-04-06 DIAGNOSIS — R63.4 ABNORMAL WEIGHT LOSS: ICD-10-CM

## 2022-04-06 PROCEDURE — 99214 OFFICE O/P EST MOD 30 MIN: CPT

## 2022-04-06 RX ORDER — ASPIRIN 81 MG
81 TABLET, DELAYED RELEASE (ENTERIC COATED) ORAL
Refills: 0 | Status: DISCONTINUED | COMMUNITY
End: 2022-04-06

## 2022-04-06 RX ORDER — LISINOPRIL 20 MG/1
20 TABLET ORAL DAILY
Qty: 30 | Refills: 3 | Status: DISCONTINUED | COMMUNITY
Start: 2017-09-05 | End: 2022-04-06

## 2022-04-06 RX ORDER — AMLODIPINE BESYLATE 5 MG/1
5 TABLET ORAL DAILY
Qty: 30 | Refills: 3 | Status: DISCONTINUED | COMMUNITY
Start: 2017-09-05 | End: 2022-04-06

## 2022-04-06 RX ORDER — NINTEDANIB 150 MG/1
150 CAPSULE ORAL TWICE DAILY
Qty: 60 | Refills: 2 | Status: DISCONTINUED | COMMUNITY
Start: 2021-05-11 | End: 2022-04-06

## 2022-04-07 PROBLEM — R63.4 WEIGHT LOSS, UNINTENTIONAL: Status: ACTIVE | Noted: 2022-04-07

## 2022-04-08 ENCOUNTER — NON-APPOINTMENT (OUTPATIENT)
Age: 77
End: 2022-04-08

## 2022-04-13 ENCOUNTER — NON-APPOINTMENT (OUTPATIENT)
Age: 77
End: 2022-04-13

## 2022-05-09 ENCOUNTER — NON-APPOINTMENT (OUTPATIENT)
Age: 77
End: 2022-05-09

## 2022-05-10 ENCOUNTER — NON-APPOINTMENT (OUTPATIENT)
Age: 77
End: 2022-05-10

## 2022-07-08 ENCOUNTER — APPOINTMENT (OUTPATIENT)
Dept: PULMONOLOGY | Facility: CLINIC | Age: 77
End: 2022-07-08

## 2022-07-08 VITALS — BODY MASS INDEX: 21.09 KG/M2 | WEIGHT: 147 LBS

## 2022-07-08 VITALS — OXYGEN SATURATION: 90 % | HEART RATE: 97 BPM | RESPIRATION RATE: 16 BRPM

## 2022-07-08 DIAGNOSIS — J84.10 PULMONARY FIBROSIS, UNSPECIFIED: ICD-10-CM

## 2022-07-08 DIAGNOSIS — J84.112 IDIOPATHIC PULMONARY FIBROSIS: ICD-10-CM

## 2022-07-08 DIAGNOSIS — J47.9 BRONCHIECTASIS, UNCOMPLICATED: ICD-10-CM

## 2022-07-08 DIAGNOSIS — J96.91 RESPIRATORY FAILURE, UNSPECIFIED WITH HYPOXIA: ICD-10-CM

## 2022-07-08 DIAGNOSIS — J98.4 OTHER DISORDERS OF LUNG: ICD-10-CM

## 2022-07-08 PROCEDURE — 99214 OFFICE O/P EST MOD 30 MIN: CPT

## 2022-07-11 ENCOUNTER — NON-APPOINTMENT (OUTPATIENT)
Age: 77
End: 2022-07-11

## 2022-07-18 ENCOUNTER — NON-APPOINTMENT (OUTPATIENT)
Age: 77
End: 2022-07-18

## 2022-09-22 NOTE — ED STATDOCS - PRINCIPAL DIAGNOSIS
Called patient to attempt to reschedule anticoagulation appointment which was missed 08/11/22. Left message for patient to return call, indicating importance of close follow-up.      Carolyn Gilbert, PharmD, [address]  PGY-1 Pharmacy Resident  9/22/2022 4:38 PM
Hematuria

## 2023-01-03 ENCOUNTER — NON-APPOINTMENT (OUTPATIENT)
Age: 78
End: 2023-01-03

## 2023-01-05 ENCOUNTER — NON-APPOINTMENT (OUTPATIENT)
Age: 78
End: 2023-01-05

## 2023-01-31 ENCOUNTER — APPOINTMENT (OUTPATIENT)
Dept: PULMONOLOGY | Facility: CLINIC | Age: 78
End: 2023-01-31

## 2023-02-28 NOTE — PATIENT PROFILE ADULT. - PRO MENTAL HEALTH SX RECENT
Ronaldo Cowart (son)/Benefits, risks, and possible complications of procedure explained to patient/caregiver who verbalized understanding and gave verbal consent.
Benefits, risks, and possible complications of procedure explained to patient/caregiver who verbalized understanding and gave written consent.
none

## 2024-01-17 NOTE — ED ADULT TRIAGE NOTE - ESI TRIAGE ACUITY LEVEL, MLM
Quality 226: Preventive Care And Screening: Tobacco Use: Screening And Cessation Intervention: Patient screened for tobacco use and is an ex/non-smoker
Detail Level: Detailed
Quality 431: Preventive Care And Screening: Unhealthy Alcohol Use - Screening: Patient not identified as an unhealthy alcohol user when screened for unhealthy alcohol use using a systematic screening method
3

## 2024-05-20 NOTE — ED PROVIDER NOTE - NSICDXFAMILYHX_GEN_ALL_CORE_FT
-Benzonatate for cough  -Benzocaine lozenges for sore throat  -Take acetaminophen for pain or fever  -Staff will call with test whether positive or negative  -Call your doctor in a few days if symptoms persist  -Go to the emergency room if your condition worsens   FAMILY HISTORY:  Father  Still living? No  Family history of cerebrovascular accident (CVA), Age at diagnosis: Age Unknown

## 2024-09-13 NOTE — ED ADULT TRIAGE NOTE - NS ED NURSE BANDS TYPE
Spoke with patient.   Informed of pcp message and recommendations. Informed to have repeat lab blood. Patient states will do. Pt verbalized understanding and no further questions or concerns at this time.   Name band;

## 2024-10-04 NOTE — CONSULT NOTE ADULT - SUBJECTIVE AND OBJECTIVE BOX
show HPI:  76 yo M w/ PMH of Pulmonary Fibrosis on home O2, HTN, Asthma, Anxiety, BPH with chronic indwelling Reilly presenting with chief complaint of hematuria. Patient states he had his Reilly changed on Wednesday. He normally follows with Dr. Ng and was scheduled to follow up tomorrow. He originally presented to the ED yesterday with complaints of blood clots, hematuria. He states his urine was purple. He states his tube was "clogged" and urine output decreased. He was discharged from the ED after it cleared up and then returned that night. Reilly was replaced and patient was then discharged. His symptoms recurred therefore he presented to the ED again today.    Patient denies any fever, chills, chest pain, palpitations, orthopnea/PND, nausea, vomiting, diarrhea, constipation, cough, wheezing, SOB.    Urology consultation called for gross hematuria.  The patient's urologist is Dr. Jarvis.  He last saw him on Wednesday when he had his chronic reilly changed.  The patient reports having gross hematuria after the reilly change.  He returned to the ER when he had his reilly changed In the ER.    the patient denies fever or chills.   The patient has chronic urinary retention.     (2021 16:36)      PAST MEDICAL & SURGICAL HISTORY:  HTN (hypertension)    Anxiety    BPH (benign prostatic hyperplasia)    Asthma    Dyspnea    Bronchiectasis    S/P appendectomy    History of incisional hernia repair    H/O reduction of nasal fracture  deviated septum        REVIEW OF SYSTEMS:    Constitutional: No fever, weight loss or fatigue  Eyes: No eye pain, visual disturbances, or discharge  ENMT:  No difficulty hearing, tinnitus, vertigo; No sinus or throat pain  Respiratory: No cough, wheezing, chills or hemoptysis  Cardiovascular: No chest pain, palpitations, shortness of breath, dizziness or leg swelling  Gastrointestinal: No abdominal or epigastric pain. No nausea, vomiting or hematemesis; No diarrhea or constipation. No melena or hematochezia.  Genitourinary: positive for hematuria  Rectal: No pain, hemorrhoids or incontinence  Neurological: No headaches, memory loss, loss of strength, numbness or tremors  Skin: No itching, burning, rashes or lesions   Lymph Nodes: No enlarged glands  Musculoskeletal: No joint pain or swelling; No muscle, back or extremity pain  Psychiatric: No depression, anxiety, mood swings or difficulty sleeping  Heme/Lymph: No easy bruising or bleeding gums  Allergy and Immunologic: No hives or eczema    MEDICATIONS  (STANDING):  amLODIPine   Tablet 5 milliGRAM(s) Oral daily  budesonide 160 MICROgram(s)/formoterol 4.5 MICROgram(s) Inhaler 2 Puff(s) Inhalation two times a day  lisinopril 20 milliGRAM(s) Oral daily  LORazepam     Tablet 0.5 milliGRAM(s) Oral daily  tamsulosin 0.8 milliGRAM(s) Oral at bedtime    MEDICATIONS  (PRN):      Allergies    No Known Allergies    Intolerances        SOCIAL HISTORY:    FAMILY HISTORY:  Family history of cerebrovascular accident (CVA) (Father)        Vital Signs Last 24 Hrs  T(C): 36.6 (2021 05:15), Max: 36.8 (2021 19:16)  T(F): 97.9 (2021 05:15), Max: 98.2 (2021 19:16)  HR: 82 (2021 05:15) (68 - 97)  BP: 138/75 (2021 05:15) (124/72 - 148/75)  BP(mean): --  RR: 17 (2021 05:15) (16 - 18)  SpO2: 96% (2021 05:15) (91% - 96%)    PHYSICAL EXAM:    General: Well developed; well nourished; in no acute distress  Head: Normocephalic; atraumatic  Respiratory: No tachypnea  Gastrointestinal: Soft non-tender non-distended;   Genitourinary: No costovertebral angle tenderness.  Urinary bladder is clinically not distended  reilly clear  Extremities: Normal range of motion, No edema  Neurological: Alert and oriented x4  Skin: Warm and dry. No acute rash  Psychiatric: Cooperative and appropriate      LABS:                        12.6   8.40  )-----------( 159      ( 2021 05:46 )             36.8     06-21    131<L>  |  98  |  22.9<H>  ----------------------------<  100<H>  4.4   |  21.0<L>  |  1.05    Ca    8.6      2021 05:46    TPro  8.4  /  Alb  3.3  /  TBili  1.1  /  DBili  x   /  AST  21  /  ALT  12  /  AlkPhos  73  06-21    PT/INR - ( 2021 05:46 )   PT: 14.3 sec;   INR: 1.25 ratio         PTT - ( 2021 05:46 )  PTT:33.2 sec  Urinalysis Basic - ( 2021 10:21 )    Color: Red / Appearance: bloody / S.005 / pH: x  Gluc: x / Ketone: Small  / Bili: Negative / Urobili: Negative   Blood: x / Protein: 500 mg/dL / Nitrite: Negative   Leuk Esterase: Negative / RBC: >50 /HPF / WBC 3-5   Sq Epi: x / Non Sq Epi: x / Bacteria: Moderate        RADIOLOGY & ADDITIONAL STUDIES:

## 2025-02-17 NOTE — ED ADULT NURSE NOTE - CHIEF COMPLAINT
AFTER YOUR ENDOSCOPY/COLONOSCOPY    Date of Procedure:  2/17/2025    You had the following procedure(s) performed today:  Colonoscopy    Exam Results:  The physician removed 1 polyp(s), which will be sent to the lab for testing. Results can take up to 14 business days to be communicated to you.  Your physician will contact you with the result of your biopsy and when your follow-up colonoscopy is due    Diet Instructions:  You may resume your regular diet today. It is recommended to avoid heavy, greasy, and spicy foods today.    Medications:  Do not use aspirin or ibuprofen (Advil, Motrin, etc.) or other NSAIDS (anti-inflammatory drugs like Aleve or Naprosyn) for 10 days.  You may use Tylenol (acetaminophen) for relief is necessary.    Activity for Today:    DO NOT DRIVE.  Do not operate any other heavy machinery or equipment.  Avoid activities that require coordination (climbing ladders, using a knife/cooking equipment, etc.)  Do not make important financial or legal decisions  Do not return to work.  Do not drink any alcohol.  Rest the remainder of the day.     ** You may resume your activity tomorrow.    It is NORMAL to have.....    Colonoscopy / Sigmoidoscopy   Mild abdominal distention and/or cramping are normal after these procedures, but should pass within an hour or two with the passage of air.  A small amount of rectal bleeding (a few streaks of blood on the toilet paper) is normal following biopsy, polypectomy or if you have hemorrhoids.   Mild nausea and/or vomiting       Please CALL Dr. Stoner - [phone]    For Colonoscopy / Sigmoidoscopy   If you have unusual belly pain that is NOT relieved with passing air  If you have rectal bleeding that is more than just a few streaks of blood on the toilet tissue  If you have moderate nausea and/or vomiting         Go to the EMERGENCY ROOM if you experience any of the following:  Severe pain  Difficulty breathing or swallowing  Persistent vomiting  Vomiting  blood, either brown (coffee ground in consistency) or red  Significant rectal bleeding of bright red blood  Black colored or bloody stool  Chills or fever over 101 degrees F.     Additional Instructions:  Any further questions after hours please contact [facility] 24 hours nurse call center at [phone].        Understanding Colon and Rectal Polyps  The colon (also called the large intestine) is a muscular tube that forms the last part of the digestive tract. It absorbs water and stores food waste. The colon is about 4 to 6 feet long. The rectum is the last 6 inches of the colon. The colon and rectum have a smooth lining composed of millions of cells. Changes in these cells can lead to growths called polyps in the colon. These can become cancerous and should be removed. Many tests are available to screen for colon cancer. But colonoscopy is the only test that looks directly into the entire large intestine and allows for treatment right away. During colonoscopy, these polyps can be removed. How often you need this test depends on many things. These include your condition, your family history, symptoms, and what the findings were at the previous colonoscopy.    When the colon lining changes  Changes that happen in the cells that line the colon or rectum can lead to growths called polyps. Over a period of years, polyps can turn into cancer. Removing polyps early may prevent cancer from ever forming.   Polyps  Polyps are fleshy clumps of tissue that form on the lining of the colon or rectum. Small polyps are usually not cancer (benign). But over time, cells in a polyp can change and become precancerous. Certain types of polyps known as adenomatous polyps and serrated polyps are precancerous. The risk for cancer also increases with the size of the polyp and certain cell and gene features. This means that they may become cancerous if they're not removed. Hyperplastic polyps are benign. They can grow quite  large and not turn cancerous.      Cancer  Almost all colorectal cancers start when polyp cells start growing abnormally. As a cancerous tumor grows, it may affect more and more of the colon or rectum. In time, cancer can also grow beyond the colon or rectum and spread to nearby organs or to glands called lymph nodes. The cells can also travel to other parts of the body. This is known as metastasis. The earlier a cancerous tumor is removed, the better the chance of preventing its spread.     [employer] last reviewed this educational content on 10/1/2021    © 9595-6247 [employer]. All rights reserved. This information is not intended as a substitute for professional medical care. Always follow your healthcare professional's instructions.           Want to Say “Thank You” to a Nurse?  The BARBIE Award® was created in memory of JEFFREY Yanez by his family to say thank you to bedside nurses who provide an outstanding level of care.  Submit a nomination using any method below.     OR    [email or web address]          The patient is a 76y Male complaining of abdominal pain.

## 2025-02-17 NOTE — ED PROVIDER NOTE - CONSTITUTIONAL, MLM
I would recommend that he stop the medication and monitor for improvement of the symptoms. Does not need to wean off.     There are alternatives, but I would want to make sure these symptoms improve before any of them are considered.   normal... Well appearing, well nourished, awake, alert, oriented to person, place, time/situation and in no apparent distress.

## 2025-03-18 NOTE — ED ADULT TRIAGE NOTE - NS ED TRIAGE AVPU SCALE
A balloon catheter was inserted. Supply used: (CATHETER BLN MNRL EMERGE 3MM 15MM 144CM 2 LUM ULTRA LOWPRFL). Alert-The patient is alert, awake and responds to voice. The patient is oriented to time, place, and person. The triage nurse is able to obtain subjective information.